# Patient Record
Sex: MALE | Race: WHITE | NOT HISPANIC OR LATINO | Employment: OTHER | URBAN - METROPOLITAN AREA
[De-identification: names, ages, dates, MRNs, and addresses within clinical notes are randomized per-mention and may not be internally consistent; named-entity substitution may affect disease eponyms.]

---

## 2019-10-29 ENCOUNTER — HOSPITAL ENCOUNTER (OUTPATIENT)
Dept: LAB | Facility: MEDICAL CENTER | Age: 64
End: 2019-10-29
Attending: FAMILY MEDICINE
Payer: COMMERCIAL

## 2019-10-29 LAB
25(OH)D3 SERPL-MCNC: 17 NG/ML (ref 30–100)
ALBUMIN SERPL BCP-MCNC: 4.5 G/DL (ref 3.2–4.9)
ALBUMIN/GLOB SERPL: 1.6 G/DL
ALP SERPL-CCNC: 87 U/L (ref 30–99)
ALT SERPL-CCNC: 23 U/L (ref 2–50)
ANION GAP SERPL CALC-SCNC: 8 MMOL/L (ref 0–11.9)
AST SERPL-CCNC: 39 U/L (ref 12–45)
BASOPHILS # BLD AUTO: 0.9 % (ref 0–1.8)
BASOPHILS # BLD: 0.08 K/UL (ref 0–0.12)
BILIRUB SERPL-MCNC: 0.6 MG/DL (ref 0.1–1.5)
BUN SERPL-MCNC: 20 MG/DL (ref 8–22)
CALCIUM SERPL-MCNC: 9.6 MG/DL (ref 8.5–10.5)
CHLORIDE SERPL-SCNC: 98 MMOL/L (ref 96–112)
CHOLEST SERPL-MCNC: 172 MG/DL (ref 100–199)
CO2 SERPL-SCNC: 31 MMOL/L (ref 20–33)
CREAT SERPL-MCNC: 0.98 MG/DL (ref 0.5–1.4)
EOSINOPHIL # BLD AUTO: 0.21 K/UL (ref 0–0.51)
EOSINOPHIL NFR BLD: 2.4 % (ref 0–6.9)
ERYTHROCYTE [DISTWIDTH] IN BLOOD BY AUTOMATED COUNT: 45.8 FL (ref 35.9–50)
FASTING STATUS PATIENT QL REPORTED: NORMAL
GLOBULIN SER CALC-MCNC: 2.8 G/DL (ref 1.9–3.5)
GLUCOSE SERPL-MCNC: 84 MG/DL (ref 65–99)
HCT VFR BLD AUTO: 44.7 % (ref 42–52)
HDLC SERPL-MCNC: 34 MG/DL
HGB BLD-MCNC: 14.4 G/DL (ref 14–18)
IMM GRANULOCYTES # BLD AUTO: 0.05 K/UL (ref 0–0.11)
IMM GRANULOCYTES NFR BLD AUTO: 0.6 % (ref 0–0.9)
LDLC SERPL CALC-MCNC: 110 MG/DL
LYMPHOCYTES # BLD AUTO: 2.14 K/UL (ref 1–4.8)
LYMPHOCYTES NFR BLD: 24.5 % (ref 22–41)
MCH RBC QN AUTO: 28.1 PG (ref 27–33)
MCHC RBC AUTO-ENTMCNC: 32.2 G/DL (ref 33.7–35.3)
MCV RBC AUTO: 87.3 FL (ref 81.4–97.8)
MONOCYTES # BLD AUTO: 0.8 K/UL (ref 0–0.85)
MONOCYTES NFR BLD AUTO: 9.2 % (ref 0–13.4)
NEUTROPHILS # BLD AUTO: 5.46 K/UL (ref 1.82–7.42)
NEUTROPHILS NFR BLD: 62.4 % (ref 44–72)
NRBC # BLD AUTO: 0 K/UL
NRBC BLD-RTO: 0 /100 WBC
PLATELET # BLD AUTO: 395 K/UL (ref 164–446)
PMV BLD AUTO: 9.7 FL (ref 9–12.9)
POTASSIUM SERPL-SCNC: 4.6 MMOL/L (ref 3.6–5.5)
PROT SERPL-MCNC: 7.3 G/DL (ref 6–8.2)
PSA SERPL-MCNC: 0.35 NG/ML (ref 0–4)
RBC # BLD AUTO: 5.12 M/UL (ref 4.7–6.1)
SODIUM SERPL-SCNC: 137 MMOL/L (ref 135–145)
TRIGL SERPL-MCNC: 138 MG/DL (ref 0–149)
WBC # BLD AUTO: 8.7 K/UL (ref 4.8–10.8)

## 2019-10-29 PROCEDURE — 82306 VITAMIN D 25 HYDROXY: CPT

## 2019-10-29 PROCEDURE — 84153 ASSAY OF PSA TOTAL: CPT

## 2019-10-29 PROCEDURE — 85025 COMPLETE CBC W/AUTO DIFF WBC: CPT

## 2019-10-29 PROCEDURE — 80053 COMPREHEN METABOLIC PANEL: CPT

## 2019-10-29 PROCEDURE — 36415 COLL VENOUS BLD VENIPUNCTURE: CPT

## 2019-10-29 PROCEDURE — 80061 LIPID PANEL: CPT

## 2020-05-28 ENCOUNTER — HOSPITAL ENCOUNTER (OUTPATIENT)
Dept: LAB | Facility: MEDICAL CENTER | Age: 65
End: 2020-05-28
Attending: FAMILY MEDICINE
Payer: COMMERCIAL

## 2020-05-28 LAB
ALBUMIN SERPL BCP-MCNC: 3.6 G/DL (ref 3.2–4.9)
ALBUMIN/GLOB SERPL: 1.2 G/DL
ALP SERPL-CCNC: 81 U/L (ref 30–99)
ALT SERPL-CCNC: 20 U/L (ref 2–50)
ANION GAP SERPL CALC-SCNC: 11 MMOL/L (ref 7–16)
AST SERPL-CCNC: 28 U/L (ref 12–45)
BASOPHILS # BLD AUTO: 0.6 % (ref 0–1.8)
BASOPHILS # BLD: 0.08 K/UL (ref 0–0.12)
BILIRUB SERPL-MCNC: 0.7 MG/DL (ref 0.1–1.5)
BUN SERPL-MCNC: 17 MG/DL (ref 8–22)
CALCIUM SERPL-MCNC: 9.5 MG/DL (ref 8.5–10.5)
CHLORIDE SERPL-SCNC: 89 MMOL/L (ref 96–112)
CHOLEST SERPL-MCNC: 137 MG/DL (ref 100–199)
CO2 SERPL-SCNC: 28 MMOL/L (ref 20–33)
CREAT SERPL-MCNC: 0.87 MG/DL (ref 0.5–1.4)
EOSINOPHIL # BLD AUTO: 0.08 K/UL (ref 0–0.51)
EOSINOPHIL NFR BLD: 0.6 % (ref 0–6.9)
ERYTHROCYTE [DISTWIDTH] IN BLOOD BY AUTOMATED COUNT: 42.6 FL (ref 35.9–50)
FASTING STATUS PATIENT QL REPORTED: NORMAL
GLOBULIN SER CALC-MCNC: 3.1 G/DL (ref 1.9–3.5)
GLUCOSE SERPL-MCNC: 96 MG/DL (ref 65–99)
HCT VFR BLD AUTO: 35.6 % (ref 42–52)
HDLC SERPL-MCNC: 30 MG/DL
HGB BLD-MCNC: 11.5 G/DL (ref 14–18)
IMM GRANULOCYTES # BLD AUTO: 0.08 K/UL (ref 0–0.11)
IMM GRANULOCYTES NFR BLD AUTO: 0.6 % (ref 0–0.9)
LDLC SERPL CALC-MCNC: 86 MG/DL
LYMPHOCYTES # BLD AUTO: 1.59 K/UL (ref 1–4.8)
LYMPHOCYTES NFR BLD: 12.3 % (ref 22–41)
MCH RBC QN AUTO: 27.5 PG (ref 27–33)
MCHC RBC AUTO-ENTMCNC: 32.3 G/DL (ref 33.7–35.3)
MCV RBC AUTO: 85.2 FL (ref 81.4–97.8)
MONOCYTES # BLD AUTO: 1.44 K/UL (ref 0–0.85)
MONOCYTES NFR BLD AUTO: 11.2 % (ref 0–13.4)
NEUTROPHILS # BLD AUTO: 9.61 K/UL (ref 1.82–7.42)
NEUTROPHILS NFR BLD: 74.7 % (ref 44–72)
NRBC # BLD AUTO: 0 K/UL
NRBC BLD-RTO: 0 /100 WBC
PLATELET # BLD AUTO: 596 K/UL (ref 164–446)
PMV BLD AUTO: 9 FL (ref 9–12.9)
POTASSIUM SERPL-SCNC: 4.2 MMOL/L (ref 3.6–5.5)
PROT SERPL-MCNC: 6.7 G/DL (ref 6–8.2)
RBC # BLD AUTO: 4.18 M/UL (ref 4.7–6.1)
SODIUM SERPL-SCNC: 128 MMOL/L (ref 135–145)
TRIGL SERPL-MCNC: 104 MG/DL (ref 0–149)
WBC # BLD AUTO: 12.9 K/UL (ref 4.8–10.8)

## 2020-05-28 PROCEDURE — 80053 COMPREHEN METABOLIC PANEL: CPT

## 2020-05-28 PROCEDURE — 82306 VITAMIN D 25 HYDROXY: CPT

## 2020-05-28 PROCEDURE — 85025 COMPLETE CBC W/AUTO DIFF WBC: CPT

## 2020-05-28 PROCEDURE — 36415 COLL VENOUS BLD VENIPUNCTURE: CPT

## 2020-05-28 PROCEDURE — 80061 LIPID PANEL: CPT

## 2020-05-29 LAB — 25(OH)D3 SERPL-MCNC: 46 NG/ML (ref 30–100)

## 2020-06-11 ENCOUNTER — APPOINTMENT (OUTPATIENT)
Dept: RADIOLOGY | Facility: MEDICAL CENTER | Age: 65
DRG: 853 | End: 2020-06-11
Attending: EMERGENCY MEDICINE
Payer: COMMERCIAL

## 2020-06-11 ENCOUNTER — HOSPITAL ENCOUNTER (INPATIENT)
Facility: MEDICAL CENTER | Age: 65
LOS: 15 days | DRG: 853 | End: 2020-06-26
Attending: EMERGENCY MEDICINE | Admitting: INTERNAL MEDICINE
Payer: COMMERCIAL

## 2020-06-11 DIAGNOSIS — D64.9 SYMPTOMATIC ANEMIA: ICD-10-CM

## 2020-06-11 DIAGNOSIS — K25.4 GASTROINTESTINAL HEMORRHAGE ASSOCIATED WITH GASTRIC ULCER: ICD-10-CM

## 2020-06-11 DIAGNOSIS — K92.2 UPPER GI BLEED: ICD-10-CM

## 2020-06-11 DIAGNOSIS — K31.89 GASTRIC MASS: Primary | ICD-10-CM

## 2020-06-11 LAB
ABO + RH BLD: NORMAL
ABO GROUP BLD: NORMAL
ALBUMIN SERPL BCP-MCNC: 3.2 G/DL (ref 3.2–4.9)
ALBUMIN/GLOB SERPL: 1 G/DL
ALP SERPL-CCNC: 85 U/L (ref 30–99)
ALT SERPL-CCNC: 22 U/L (ref 2–50)
ANION GAP SERPL CALC-SCNC: 15 MMOL/L (ref 7–16)
APTT PPP: 21.4 SEC (ref 24.7–36)
AST SERPL-CCNC: 22 U/L (ref 12–45)
BARCODED ABORH UBTYP: 5100
BARCODED PRD CODE UBPRD: NORMAL
BARCODED UNIT NUM UBUNT: NORMAL
BASOPHILS # BLD AUTO: 0.3 % (ref 0–1.8)
BASOPHILS # BLD: 0.07 K/UL (ref 0–0.12)
BILIRUB SERPL-MCNC: 0.5 MG/DL (ref 0.1–1.5)
BLD GP AB SCN SERPL QL: NORMAL
BUN SERPL-MCNC: 21 MG/DL (ref 8–22)
CALCIUM SERPL-MCNC: 9 MG/DL (ref 8.5–10.5)
CHLORIDE SERPL-SCNC: 93 MMOL/L (ref 96–112)
CO2 SERPL-SCNC: 23 MMOL/L (ref 20–33)
COMPONENT R 8504R: NORMAL
CREAT SERPL-MCNC: 0.96 MG/DL (ref 0.5–1.4)
EOSINOPHIL # BLD AUTO: 0.03 K/UL (ref 0–0.51)
EOSINOPHIL NFR BLD: 0.1 % (ref 0–6.9)
ERYTHROCYTE [DISTWIDTH] IN BLOOD BY AUTOMATED COUNT: 44.6 FL (ref 35.9–50)
GLOBULIN SER CALC-MCNC: 3.3 G/DL (ref 1.9–3.5)
GLUCOSE SERPL-MCNC: 147 MG/DL (ref 65–99)
HCT VFR BLD AUTO: 25 % (ref 42–52)
HGB BLD-MCNC: 8 G/DL (ref 14–18)
IMM GRANULOCYTES # BLD AUTO: 0.33 K/UL (ref 0–0.11)
IMM GRANULOCYTES NFR BLD AUTO: 1.3 % (ref 0–0.9)
INR PPP: 1.21 (ref 0.87–1.13)
LACTATE BLD-SCNC: 1.5 MMOL/L (ref 0.5–2)
LIPASE SERPL-CCNC: 26 U/L (ref 11–82)
LYMPHOCYTES # BLD AUTO: 1.35 K/UL (ref 1–4.8)
LYMPHOCYTES NFR BLD: 5.5 % (ref 22–41)
MCH RBC QN AUTO: 27.3 PG (ref 27–33)
MCHC RBC AUTO-ENTMCNC: 32 G/DL (ref 33.7–35.3)
MCV RBC AUTO: 85.3 FL (ref 81.4–97.8)
MONOCYTES # BLD AUTO: 0.98 K/UL (ref 0–0.85)
MONOCYTES NFR BLD AUTO: 4 % (ref 0–13.4)
NEUTROPHILS # BLD AUTO: 21.7 K/UL (ref 1.82–7.42)
NEUTROPHILS NFR BLD: 88.8 % (ref 44–72)
NRBC # BLD AUTO: 0 K/UL
NRBC BLD-RTO: 0 /100 WBC
PLATELET # BLD AUTO: 862 K/UL (ref 164–446)
PMV BLD AUTO: 8.7 FL (ref 9–12.9)
POTASSIUM SERPL-SCNC: 4.8 MMOL/L (ref 3.6–5.5)
PRODUCT TYPE UPROD: NORMAL
PROT SERPL-MCNC: 6.5 G/DL (ref 6–8.2)
PROTHROMBIN TIME: 15.6 SEC (ref 12–14.6)
RBC # BLD AUTO: 2.93 M/UL (ref 4.7–6.1)
RH BLD: NORMAL
SODIUM SERPL-SCNC: 131 MMOL/L (ref 135–145)
UNIT STATUS USTAT: NORMAL
WBC # BLD AUTO: 24.5 K/UL (ref 4.8–10.8)

## 2020-06-11 PROCEDURE — 96375 TX/PRO/DX INJ NEW DRUG ADDON: CPT

## 2020-06-11 PROCEDURE — 86900 BLOOD TYPING SEROLOGIC ABO: CPT

## 2020-06-11 PROCEDURE — 99223 1ST HOSP IP/OBS HIGH 75: CPT | Performed by: INTERNAL MEDICINE

## 2020-06-11 PROCEDURE — 96368 THER/DIAG CONCURRENT INF: CPT

## 2020-06-11 PROCEDURE — 83690 ASSAY OF LIPASE: CPT

## 2020-06-11 PROCEDURE — C9803 HOPD COVID-19 SPEC COLLECT: HCPCS | Performed by: INTERNAL MEDICINE

## 2020-06-11 PROCEDURE — 71045 X-RAY EXAM CHEST 1 VIEW: CPT

## 2020-06-11 PROCEDURE — 85610 PROTHROMBIN TIME: CPT

## 2020-06-11 PROCEDURE — 85025 COMPLETE CBC W/AUTO DIFF WBC: CPT

## 2020-06-11 PROCEDURE — 700105 HCHG RX REV CODE 258: Performed by: INTERNAL MEDICINE

## 2020-06-11 PROCEDURE — C9113 INJ PANTOPRAZOLE SODIUM, VIA: HCPCS | Performed by: INTERNAL MEDICINE

## 2020-06-11 PROCEDURE — 700111 HCHG RX REV CODE 636 W/ 250 OVERRIDE (IP): Performed by: EMERGENCY MEDICINE

## 2020-06-11 PROCEDURE — 87040 BLOOD CULTURE FOR BACTERIA: CPT

## 2020-06-11 PROCEDURE — C9113 INJ PANTOPRAZOLE SODIUM, VIA: HCPCS | Performed by: EMERGENCY MEDICINE

## 2020-06-11 PROCEDURE — 770020 HCHG ROOM/CARE - TELE (206)

## 2020-06-11 PROCEDURE — 700111 HCHG RX REV CODE 636 W/ 250 OVERRIDE (IP): Performed by: INTERNAL MEDICINE

## 2020-06-11 PROCEDURE — 86850 RBC ANTIBODY SCREEN: CPT

## 2020-06-11 PROCEDURE — 80053 COMPREHEN METABOLIC PANEL: CPT

## 2020-06-11 PROCEDURE — 86901 BLOOD TYPING SEROLOGIC RH(D): CPT

## 2020-06-11 PROCEDURE — 700105 HCHG RX REV CODE 258: Performed by: EMERGENCY MEDICINE

## 2020-06-11 PROCEDURE — 96367 TX/PROPH/DG ADDL SEQ IV INF: CPT

## 2020-06-11 PROCEDURE — 700101 HCHG RX REV CODE 250: Performed by: EMERGENCY MEDICINE

## 2020-06-11 PROCEDURE — 83605 ASSAY OF LACTIC ACID: CPT

## 2020-06-11 PROCEDURE — 96365 THER/PROPH/DIAG IV INF INIT: CPT

## 2020-06-11 PROCEDURE — 74177 CT ABD & PELVIS W/CONTRAST: CPT

## 2020-06-11 PROCEDURE — 700117 HCHG RX CONTRAST REV CODE 255: Performed by: EMERGENCY MEDICINE

## 2020-06-11 PROCEDURE — 85730 THROMBOPLASTIN TIME PARTIAL: CPT

## 2020-06-11 PROCEDURE — 36415 COLL VENOUS BLD VENIPUNCTURE: CPT

## 2020-06-11 PROCEDURE — 96366 THER/PROPH/DIAG IV INF ADDON: CPT

## 2020-06-11 PROCEDURE — 99285 EMERGENCY DEPT VISIT HI MDM: CPT

## 2020-06-11 RX ORDER — ENALAPRILAT 1.25 MG/ML
1.25 INJECTION INTRAVENOUS EVERY 6 HOURS PRN
Status: DISCONTINUED | OUTPATIENT
Start: 2020-06-11 | End: 2020-06-26 | Stop reason: HOSPADM

## 2020-06-11 RX ORDER — ONDANSETRON 2 MG/ML
4 INJECTION INTRAMUSCULAR; INTRAVENOUS EVERY 4 HOURS PRN
Status: DISCONTINUED | OUTPATIENT
Start: 2020-06-11 | End: 2020-06-26 | Stop reason: HOSPADM

## 2020-06-11 RX ORDER — SODIUM CHLORIDE, SODIUM LACTATE, POTASSIUM CHLORIDE, AND CALCIUM CHLORIDE .6; .31; .03; .02 G/100ML; G/100ML; G/100ML; G/100ML
30 INJECTION, SOLUTION INTRAVENOUS
Status: DISCONTINUED | OUTPATIENT
Start: 2020-06-11 | End: 2020-06-26 | Stop reason: HOSPADM

## 2020-06-11 RX ORDER — AMOXICILLIN 250 MG
2 CAPSULE ORAL 2 TIMES DAILY
Status: DISCONTINUED | OUTPATIENT
Start: 2020-06-11 | End: 2020-06-26 | Stop reason: HOSPADM

## 2020-06-11 RX ORDER — POLYETHYLENE GLYCOL 3350 17 G/17G
1 POWDER, FOR SOLUTION ORAL
Status: DISCONTINUED | OUTPATIENT
Start: 2020-06-11 | End: 2020-06-26 | Stop reason: HOSPADM

## 2020-06-11 RX ORDER — SODIUM CHLORIDE, SODIUM LACTATE, POTASSIUM CHLORIDE, AND CALCIUM CHLORIDE .6; .31; .03; .02 G/100ML; G/100ML; G/100ML; G/100ML
1000 INJECTION, SOLUTION INTRAVENOUS
Status: DISCONTINUED | OUTPATIENT
Start: 2020-06-11 | End: 2020-06-26 | Stop reason: HOSPADM

## 2020-06-11 RX ORDER — PROMETHAZINE HYDROCHLORIDE 25 MG/1
12.5-25 SUPPOSITORY RECTAL EVERY 4 HOURS PRN
Status: DISCONTINUED | OUTPATIENT
Start: 2020-06-11 | End: 2020-06-26 | Stop reason: HOSPADM

## 2020-06-11 RX ORDER — SODIUM CHLORIDE, SODIUM LACTATE, POTASSIUM CHLORIDE, CALCIUM CHLORIDE 600; 310; 30; 20 MG/100ML; MG/100ML; MG/100ML; MG/100ML
INJECTION, SOLUTION INTRAVENOUS CONTINUOUS
Status: DISCONTINUED | OUTPATIENT
Start: 2020-06-11 | End: 2020-06-15

## 2020-06-11 RX ORDER — ACETAMINOPHEN 325 MG/1
650 TABLET ORAL EVERY 6 HOURS PRN
Status: DISCONTINUED | OUTPATIENT
Start: 2020-06-11 | End: 2020-06-26 | Stop reason: HOSPADM

## 2020-06-11 RX ORDER — BISACODYL 10 MG
10 SUPPOSITORY, RECTAL RECTAL
Status: DISCONTINUED | OUTPATIENT
Start: 2020-06-11 | End: 2020-06-26 | Stop reason: HOSPADM

## 2020-06-11 RX ORDER — LISINOPRIL AND HYDROCHLOROTHIAZIDE 20; 12.5 MG/1; MG/1
1 TABLET ORAL DAILY
COMMUNITY

## 2020-06-11 RX ORDER — SODIUM CHLORIDE 9 MG/ML
1000 INJECTION, SOLUTION INTRAVENOUS ONCE
Status: COMPLETED | OUTPATIENT
Start: 2020-06-11 | End: 2020-06-11

## 2020-06-11 RX ORDER — PROMETHAZINE HYDROCHLORIDE 25 MG/1
12.5-25 TABLET ORAL EVERY 4 HOURS PRN
Status: DISCONTINUED | OUTPATIENT
Start: 2020-06-11 | End: 2020-06-26 | Stop reason: HOSPADM

## 2020-06-11 RX ORDER — ONDANSETRON 4 MG/1
4 TABLET, ORALLY DISINTEGRATING ORAL EVERY 4 HOURS PRN
Status: DISCONTINUED | OUTPATIENT
Start: 2020-06-11 | End: 2020-06-26 | Stop reason: HOSPADM

## 2020-06-11 RX ORDER — PROCHLORPERAZINE EDISYLATE 5 MG/ML
5-10 INJECTION INTRAMUSCULAR; INTRAVENOUS EVERY 4 HOURS PRN
Status: DISCONTINUED | OUTPATIENT
Start: 2020-06-11 | End: 2020-06-26 | Stop reason: HOSPADM

## 2020-06-11 RX ADMIN — IOHEXOL 100 ML: 350 INJECTION, SOLUTION INTRAVENOUS at 18:16

## 2020-06-11 RX ADMIN — SODIUM CHLORIDE 1000 ML: 9 INJECTION, SOLUTION INTRAVENOUS at 16:47

## 2020-06-11 RX ADMIN — SODIUM CHLORIDE 8 MG/HR: 9 INJECTION, SOLUTION INTRAVENOUS at 17:24

## 2020-06-11 RX ADMIN — METRONIDAZOLE 500 MG: 500 INJECTION, SOLUTION INTRAVENOUS at 19:45

## 2020-06-11 RX ADMIN — SODIUM CHLORIDE 80 MG: 9 INJECTION, SOLUTION INTRAVENOUS at 17:05

## 2020-06-11 RX ADMIN — SODIUM CHLORIDE, POTASSIUM CHLORIDE, SODIUM LACTATE AND CALCIUM CHLORIDE: 600; 310; 30; 20 INJECTION, SOLUTION INTRAVENOUS at 21:20

## 2020-06-11 RX ADMIN — CEFTRIAXONE SODIUM 2 G: 2 INJECTION, POWDER, FOR SOLUTION INTRAMUSCULAR; INTRAVENOUS at 19:31

## 2020-06-11 SDOH — HEALTH STABILITY: MENTAL HEALTH: HOW OFTEN DO YOU HAVE A DRINK CONTAINING ALCOHOL?: NEVER

## 2020-06-11 ASSESSMENT — PATIENT HEALTH QUESTIONNAIRE - PHQ9
2. FEELING DOWN, DEPRESSED, IRRITABLE, OR HOPELESS: NOT AT ALL
1. LITTLE INTEREST OR PLEASURE IN DOING THINGS: NOT AT ALL
SUM OF ALL RESPONSES TO PHQ9 QUESTIONS 1 AND 2: 0

## 2020-06-11 ASSESSMENT — ENCOUNTER SYMPTOMS
ABDOMINAL PAIN: 0
VOMITING: 1
CHILLS: 0
LOSS OF CONSCIOUSNESS: 0
PALPITATIONS: 0
HEADACHES: 0
FALLS: 0
SPUTUM PRODUCTION: 0
FEVER: 0
MYALGIAS: 0
STRIDOR: 0
DEPRESSION: 0
TINGLING: 0
DIARRHEA: 0
COUGH: 0
WEAKNESS: 0
SHORTNESS OF BREATH: 0
DIZZINESS: 1
NAUSEA: 1
CONSTIPATION: 0

## 2020-06-11 ASSESSMENT — LIFESTYLE VARIABLES: DO YOU DRINK ALCOHOL: NO

## 2020-06-11 ASSESSMENT — FIBROSIS 4 INDEX: FIB4 SCORE: 0.67

## 2020-06-11 NOTE — ED TRIAGE NOTES
Wheeled to triage  Chief Complaint   Patient presents with   • Bloody Stools   • Blood in Vomit     Pt said he noticed bright red blood in his stool and vomit this morning, denies any abd pain, pt is pale and said gets dizzy when standing. BP (!) 91/63   Pulse (!) 121   Temp 36.8 °C (98.2 °F) (Temporal)   Resp 16   Ht 1.829 m (6')   Wt 97.5 kg (215 lb)   SpO2 100%   BMI 29.16 kg/m²

## 2020-06-11 NOTE — ED NOTES
Pt wheeled to room red 6.  Agree with triage note.  Pt in gown and placed on monitors.  ERP to see.

## 2020-06-12 ENCOUNTER — ANESTHESIA EVENT (OUTPATIENT)
Dept: SURGERY | Facility: MEDICAL CENTER | Age: 65
DRG: 853 | End: 2020-06-12
Payer: COMMERCIAL

## 2020-06-12 ENCOUNTER — ANESTHESIA (OUTPATIENT)
Dept: SURGERY | Facility: MEDICAL CENTER | Age: 65
DRG: 853 | End: 2020-06-12
Payer: COMMERCIAL

## 2020-06-12 PROBLEM — A41.9 SEPSIS (HCC): Status: ACTIVE | Noted: 2020-06-12

## 2020-06-12 PROBLEM — E87.1 HYPONATREMIA: Status: ACTIVE | Noted: 2020-06-12

## 2020-06-12 PROBLEM — D62 ACUTE BLOOD LOSS ANEMIA: Status: ACTIVE | Noted: 2020-06-12

## 2020-06-12 PROBLEM — K31.89 GASTRIC MASS: Status: ACTIVE | Noted: 2020-06-12

## 2020-06-12 PROBLEM — K92.2 GI BLEED: Status: ACTIVE | Noted: 2020-06-12

## 2020-06-12 PROBLEM — D72.829 LEUKOCYTOSIS: Status: ACTIVE | Noted: 2020-06-12

## 2020-06-12 PROBLEM — D75.839 THROMBOCYTOSIS: Status: ACTIVE | Noted: 2020-06-12

## 2020-06-12 PROBLEM — R73.9 HYPERGLYCEMIA: Status: ACTIVE | Noted: 2020-06-12

## 2020-06-12 LAB
ANION GAP SERPL CALC-SCNC: 10 MMOL/L (ref 7–16)
APPEARANCE UR: CLEAR
BILIRUB UR QL STRIP.AUTO: NEGATIVE
BUN SERPL-MCNC: 24 MG/DL (ref 8–22)
CALCIUM SERPL-MCNC: 8.4 MG/DL (ref 8.5–10.5)
CEA SERPL-MCNC: <0.6 NG/ML (ref 0–3)
CHLORIDE SERPL-SCNC: 100 MMOL/L (ref 96–112)
CO2 SERPL-SCNC: 24 MMOL/L (ref 20–33)
COLOR UR: YELLOW
COVID ORDER STATUS COVID19: NORMAL
CREAT SERPL-MCNC: 0.87 MG/DL (ref 0.5–1.4)
ERYTHROCYTE [DISTWIDTH] IN BLOOD BY AUTOMATED COUNT: 44.9 FL (ref 35.9–50)
FERRITIN SERPL-MCNC: 546 NG/ML (ref 22–322)
FOLATE SERPL-MCNC: 11 NG/ML
GLUCOSE SERPL-MCNC: 94 MG/DL (ref 65–99)
GLUCOSE UR STRIP.AUTO-MCNC: NEGATIVE MG/DL
HCT VFR BLD AUTO: 18.9 % (ref 42–52)
HGB BLD-MCNC: 5.9 G/DL (ref 14–18)
HGB BLD-MCNC: 6.7 G/DL (ref 14–18)
HGB BLD-MCNC: 7.3 G/DL (ref 14–18)
HGB BLD-MCNC: 7.7 G/DL (ref 14–18)
INR PPP: 1.13 (ref 0.87–1.13)
IRON SATN MFR SERPL: 19 % (ref 15–55)
IRON SERPL-MCNC: 31 UG/DL (ref 50–180)
KETONES UR STRIP.AUTO-MCNC: NEGATIVE MG/DL
LACTATE BLD-SCNC: 1.1 MMOL/L (ref 0.5–2)
LACTATE BLD-SCNC: 1.2 MMOL/L (ref 0.5–2)
LACTATE BLD-SCNC: 2.1 MMOL/L (ref 0.5–2)
LEUKOCYTE ESTERASE UR QL STRIP.AUTO: NEGATIVE
MCH RBC QN AUTO: 27.4 PG (ref 27–33)
MCHC RBC AUTO-ENTMCNC: 31.2 G/DL (ref 33.7–35.3)
MCV RBC AUTO: 87.9 FL (ref 81.4–97.8)
MICRO URNS: NORMAL
NITRITE UR QL STRIP.AUTO: NEGATIVE
PATHOLOGY CONSULT NOTE: NORMAL
PH UR STRIP.AUTO: 5.5 [PH] (ref 5–8)
PLATELET # BLD AUTO: 529 K/UL (ref 164–446)
PMV BLD AUTO: 8.6 FL (ref 9–12.9)
POTASSIUM SERPL-SCNC: 4 MMOL/L (ref 3.6–5.5)
PROT UR QL STRIP: NEGATIVE MG/DL
PROTHROMBIN TIME: 14.7 SEC (ref 12–14.6)
RBC # BLD AUTO: 2.15 M/UL (ref 4.7–6.1)
RBC UR QL AUTO: NEGATIVE
SARS-COV-2 RNA RESP QL NAA+PROBE: NOTDETECTED
SODIUM SERPL-SCNC: 134 MMOL/L (ref 135–145)
SP GR UR STRIP.AUTO: 1.02
SPECIMEN SOURCE: NORMAL
TIBC SERPL-MCNC: 162 UG/DL (ref 250–450)
UIBC SERPL-MCNC: 131 UG/DL (ref 110–370)
UROBILINOGEN UR STRIP.AUTO-MCNC: 0.2 MG/DL
VIT B12 SERPL-MCNC: 265 PG/ML (ref 211–911)
WBC # BLD AUTO: 10.6 K/UL (ref 4.8–10.8)

## 2020-06-12 PROCEDURE — P9016 RBC LEUKOCYTES REDUCED: HCPCS

## 2020-06-12 PROCEDURE — 82746 ASSAY OF FOLIC ACID SERUM: CPT

## 2020-06-12 PROCEDURE — 160029 HCHG SURGERY MINUTES - 1ST 30 MINS LEVEL 4: Performed by: INTERNAL MEDICINE

## 2020-06-12 PROCEDURE — 700111 HCHG RX REV CODE 636 W/ 250 OVERRIDE (IP): Performed by: INTERNAL MEDICINE

## 2020-06-12 PROCEDURE — 85610 PROTHROMBIN TIME: CPT

## 2020-06-12 PROCEDURE — 160035 HCHG PACU - 1ST 60 MINS PHASE I: Performed by: INTERNAL MEDICINE

## 2020-06-12 PROCEDURE — 85027 COMPLETE CBC AUTOMATED: CPT

## 2020-06-12 PROCEDURE — A9270 NON-COVERED ITEM OR SERVICE: HCPCS | Performed by: INTERNAL MEDICINE

## 2020-06-12 PROCEDURE — U0004 COV-19 TEST NON-CDC HGH THRU: HCPCS

## 2020-06-12 PROCEDURE — 0DB68ZX EXCISION OF STOMACH, VIA NATURAL OR ARTIFICIAL OPENING ENDOSCOPIC, DIAGNOSTIC: ICD-10-PCS | Performed by: INTERNAL MEDICINE

## 2020-06-12 PROCEDURE — 88305 TISSUE EXAM BY PATHOLOGIST: CPT

## 2020-06-12 PROCEDURE — 700105 HCHG RX REV CODE 258: Performed by: INTERNAL MEDICINE

## 2020-06-12 PROCEDURE — 700102 HCHG RX REV CODE 250 W/ 637 OVERRIDE(OP): Performed by: INTERNAL MEDICINE

## 2020-06-12 PROCEDURE — 94760 N-INVAS EAR/PLS OXIMETRY 1: CPT

## 2020-06-12 PROCEDURE — 700101 HCHG RX REV CODE 250: Performed by: ANESTHESIOLOGY

## 2020-06-12 PROCEDURE — 85018 HEMOGLOBIN: CPT | Mod: 91

## 2020-06-12 PROCEDURE — 81003 URINALYSIS AUTO W/O SCOPE: CPT

## 2020-06-12 PROCEDURE — 160048 HCHG OR STATISTICAL LEVEL 1-5: Performed by: INTERNAL MEDICINE

## 2020-06-12 PROCEDURE — 83605 ASSAY OF LACTIC ACID: CPT | Mod: 91

## 2020-06-12 PROCEDURE — 83540 ASSAY OF IRON: CPT

## 2020-06-12 PROCEDURE — 82378 CARCINOEMBRYONIC ANTIGEN: CPT

## 2020-06-12 PROCEDURE — 88342 IMHCHEM/IMCYTCHM 1ST ANTB: CPT

## 2020-06-12 PROCEDURE — 160203 HCHG ENDO MINUTES - 1ST 30 MINS LEVEL 4: Performed by: INTERNAL MEDICINE

## 2020-06-12 PROCEDURE — C9113 INJ PANTOPRAZOLE SODIUM, VIA: HCPCS | Performed by: INTERNAL MEDICINE

## 2020-06-12 PROCEDURE — 160009 HCHG ANES TIME/MIN: Performed by: INTERNAL MEDICINE

## 2020-06-12 PROCEDURE — 86923 COMPATIBILITY TEST ELECTRIC: CPT | Mod: 91

## 2020-06-12 PROCEDURE — 88341 IMHCHEM/IMCYTCHM EA ADD ANTB: CPT

## 2020-06-12 PROCEDURE — 700105 HCHG RX REV CODE 258

## 2020-06-12 PROCEDURE — 500066 HCHG BITE BLOCK, ECT: Performed by: INTERNAL MEDICINE

## 2020-06-12 PROCEDURE — 30233N1 TRANSFUSION OF NONAUTOLOGOUS RED BLOOD CELLS INTO PERIPHERAL VEIN, PERCUTANEOUS APPROACH: ICD-10-PCS | Performed by: INTERNAL MEDICINE

## 2020-06-12 PROCEDURE — 160002 HCHG RECOVERY MINUTES (STAT): Performed by: INTERNAL MEDICINE

## 2020-06-12 PROCEDURE — 99233 SBSQ HOSP IP/OBS HIGH 50: CPT | Performed by: INTERNAL MEDICINE

## 2020-06-12 PROCEDURE — 36430 TRANSFUSION BLD/BLD COMPNT: CPT

## 2020-06-12 PROCEDURE — 82607 VITAMIN B-12: CPT

## 2020-06-12 PROCEDURE — 83550 IRON BINDING TEST: CPT

## 2020-06-12 PROCEDURE — 80048 BASIC METABOLIC PNL TOTAL CA: CPT

## 2020-06-12 PROCEDURE — 88312 SPECIAL STAINS GROUP 1: CPT

## 2020-06-12 PROCEDURE — 36415 COLL VENOUS BLD VENIPUNCTURE: CPT

## 2020-06-12 PROCEDURE — 700101 HCHG RX REV CODE 250: Performed by: INTERNAL MEDICINE

## 2020-06-12 PROCEDURE — 82728 ASSAY OF FERRITIN: CPT

## 2020-06-12 PROCEDURE — 700111 HCHG RX REV CODE 636 W/ 250 OVERRIDE (IP): Performed by: ANESTHESIOLOGY

## 2020-06-12 PROCEDURE — 770020 HCHG ROOM/CARE - TELE (206)

## 2020-06-12 RX ORDER — SODIUM CHLORIDE 9 MG/ML
INJECTION, SOLUTION INTRAVENOUS
Status: ACTIVE
Start: 2020-06-12 | End: 2020-06-12

## 2020-06-12 RX ORDER — SODIUM CHLORIDE, SODIUM LACTATE, POTASSIUM CHLORIDE, CALCIUM CHLORIDE 600; 310; 30; 20 MG/100ML; MG/100ML; MG/100ML; MG/100ML
INJECTION, SOLUTION INTRAVENOUS CONTINUOUS
Status: DISCONTINUED | OUTPATIENT
Start: 2020-06-12 | End: 2020-06-12 | Stop reason: HOSPADM

## 2020-06-12 RX ORDER — SODIUM CHLORIDE, SODIUM LACTATE, POTASSIUM CHLORIDE, CALCIUM CHLORIDE 600; 310; 30; 20 MG/100ML; MG/100ML; MG/100ML; MG/100ML
INJECTION, SOLUTION INTRAVENOUS CONTINUOUS
Status: DISPENSED | OUTPATIENT
Start: 2020-06-12 | End: 2020-06-12

## 2020-06-12 RX ORDER — ONDANSETRON 2 MG/ML
INJECTION INTRAMUSCULAR; INTRAVENOUS PRN
Status: DISCONTINUED | OUTPATIENT
Start: 2020-06-12 | End: 2020-06-12 | Stop reason: HOSPADM

## 2020-06-12 RX ORDER — SODIUM CHLORIDE 9 MG/ML
INJECTION, SOLUTION INTRAVENOUS
Status: COMPLETED
Start: 2020-06-12 | End: 2020-06-12

## 2020-06-12 RX ORDER — DIPHENHYDRAMINE HYDROCHLORIDE 50 MG/ML
12.5 INJECTION INTRAMUSCULAR; INTRAVENOUS
Status: DISCONTINUED | OUTPATIENT
Start: 2020-06-12 | End: 2020-06-12 | Stop reason: HOSPADM

## 2020-06-12 RX ORDER — HALOPERIDOL 5 MG/ML
1 INJECTION INTRAMUSCULAR
Status: DISCONTINUED | OUTPATIENT
Start: 2020-06-12 | End: 2020-06-12 | Stop reason: HOSPADM

## 2020-06-12 RX ORDER — DEXAMETHASONE SODIUM PHOSPHATE 4 MG/ML
INJECTION, SOLUTION INTRA-ARTICULAR; INTRALESIONAL; INTRAMUSCULAR; INTRAVENOUS; SOFT TISSUE PRN
Status: DISCONTINUED | OUTPATIENT
Start: 2020-06-12 | End: 2020-06-12 | Stop reason: SURG

## 2020-06-12 RX ORDER — CHOLECALCIFEROL (VITAMIN D3) 125 MCG
1000 CAPSULE ORAL DAILY
Status: DISCONTINUED | OUTPATIENT
Start: 2020-06-12 | End: 2020-06-15

## 2020-06-12 RX ADMIN — ONDANSETRON 4 MG: 2 INJECTION INTRAMUSCULAR; INTRAVENOUS at 09:41

## 2020-06-12 RX ADMIN — METRONIDAZOLE 500 MG: 500 INJECTION, SOLUTION INTRAVENOUS at 05:58

## 2020-06-12 RX ADMIN — CYANOCOBALAMIN TAB 500 MCG 1000 MCG: 500 TAB at 14:28

## 2020-06-12 RX ADMIN — SODIUM CHLORIDE 8 MG/HR: 9 INJECTION, SOLUTION INTRAVENOUS at 06:39

## 2020-06-12 RX ADMIN — ROCURONIUM BROMIDE 5 MG: 10 INJECTION, SOLUTION INTRAVENOUS at 09:32

## 2020-06-12 RX ADMIN — Medication 120 MG: at 09:32

## 2020-06-12 RX ADMIN — SODIUM CHLORIDE, POTASSIUM CHLORIDE, SODIUM LACTATE AND CALCIUM CHLORIDE: 600; 310; 30; 20 INJECTION, SOLUTION INTRAVENOUS at 11:44

## 2020-06-12 RX ADMIN — METRONIDAZOLE 500 MG: 500 INJECTION, SOLUTION INTRAVENOUS at 22:04

## 2020-06-12 RX ADMIN — CEFTRIAXONE SODIUM 2 G: 2 INJECTION, POWDER, FOR SOLUTION INTRAMUSCULAR; INTRAVENOUS at 19:31

## 2020-06-12 RX ADMIN — SODIUM CHLORIDE, POTASSIUM CHLORIDE, SODIUM LACTATE AND CALCIUM CHLORIDE: 600; 310; 30; 20 INJECTION, SOLUTION INTRAVENOUS at 09:30

## 2020-06-12 RX ADMIN — DEXAMETHASONE SODIUM PHOSPHATE 4 MG: 4 INJECTION, SOLUTION INTRA-ARTICULAR; INTRALESIONAL; INTRAMUSCULAR; INTRAVENOUS; SOFT TISSUE at 09:43

## 2020-06-12 RX ADMIN — POVIDONE-IODINE 15 ML: 10 SOLUTION TOPICAL at 09:21

## 2020-06-12 RX ADMIN — SODIUM CHLORIDE: 9 INJECTION, SOLUTION INTRAVENOUS at 03:45

## 2020-06-12 RX ADMIN — SODIUM CHLORIDE 8 MG/HR: 9 INJECTION, SOLUTION INTRAVENOUS at 18:04

## 2020-06-12 RX ADMIN — PROPOFOL 150 MG: 10 INJECTION, EMULSION INTRAVENOUS at 09:32

## 2020-06-12 RX ADMIN — METRONIDAZOLE 500 MG: 500 INJECTION, SOLUTION INTRAVENOUS at 13:07

## 2020-06-12 RX ADMIN — SODIUM CHLORIDE, POTASSIUM CHLORIDE, SODIUM LACTATE AND CALCIUM CHLORIDE: 600; 310; 30; 20 INJECTION, SOLUTION INTRAVENOUS at 18:06

## 2020-06-12 RX ADMIN — SODIUM CHLORIDE, POTASSIUM CHLORIDE, SODIUM LACTATE AND CALCIUM CHLORIDE: 600; 310; 30; 20 INJECTION, SOLUTION INTRAVENOUS at 02:21

## 2020-06-12 ASSESSMENT — ENCOUNTER SYMPTOMS
WEAKNESS: 0
COUGH: 0
NERVOUS/ANXIOUS: 0
FEVER: 0
VOMITING: 0
SHORTNESS OF BREATH: 0
HEADACHES: 0
BLURRED VISION: 0
FALLS: 0
BLOOD IN STOOL: 1
DIZZINESS: 0
CHILLS: 0
DEPRESSION: 0
ABDOMINAL PAIN: 1
SORE THROAT: 0
DIARRHEA: 0
PALPITATIONS: 0
CONSTIPATION: 0
NAUSEA: 0

## 2020-06-12 ASSESSMENT — LIFESTYLE VARIABLES
ON A TYPICAL DAY WHEN YOU DRINK ALCOHOL HOW MANY DRINKS DO YOU HAVE: 0
ALCOHOL_USE: NO
TOTAL SCORE: 0
AVERAGE NUMBER OF DAYS PER WEEK YOU HAVE A DRINK CONTAINING ALCOHOL: 0
EVER FELT BAD OR GUILTY ABOUT YOUR DRINKING: NO
HAVE PEOPLE ANNOYED YOU BY CRITICIZING YOUR DRINKING: NO
EVER HAD A DRINK FIRST THING IN THE MORNING TO STEADY YOUR NERVES TO GET RID OF A HANGOVER: NO
CONSUMPTION TOTAL: NEGATIVE
EVER_SMOKED: NEVER
TOTAL SCORE: 0
DOES PATIENT WANT TO STOP DRINKING: NO
TOTAL SCORE: 0
HAVE YOU EVER FELT YOU SHOULD CUT DOWN ON YOUR DRINKING: NO
EVER_SMOKED: NEVER
HOW MANY TIMES IN THE PAST YEAR HAVE YOU HAD 5 OR MORE DRINKS IN A DAY: 0

## 2020-06-12 ASSESSMENT — COGNITIVE AND FUNCTIONAL STATUS - GENERAL
SUGGESTED CMS G CODE MODIFIER MOBILITY: CH
DAILY ACTIVITIY SCORE: 24
SUGGESTED CMS G CODE MODIFIER DAILY ACTIVITY: CH
MOBILITY SCORE: 24
DAILY ACTIVITIY SCORE: 24
MOBILITY SCORE: 24
SUGGESTED CMS G CODE MODIFIER DAILY ACTIVITY: CH
SUGGESTED CMS G CODE MODIFIER DAILY ACTIVITY: CH
DAILY ACTIVITIY SCORE: 24
SUGGESTED CMS G CODE MODIFIER MOBILITY: CH

## 2020-06-12 ASSESSMENT — COPD QUESTIONNAIRES
DURING THE PAST 4 WEEKS HOW MUCH DID YOU FEEL SHORT OF BREATH: NONE/LITTLE OF THE TIME
HAVE YOU SMOKED AT LEAST 100 CIGARETTES IN YOUR ENTIRE LIFE: NO/DON'T KNOW
DO YOU EVER COUGH UP ANY MUCUS OR PHLEGM?: NO/ONLY WITH OCCASIONAL COLDS OR INFECTIONS
COPD SCREENING SCORE: 2

## 2020-06-12 ASSESSMENT — PAIN SCALES - GENERAL: PAIN_LEVEL: 0

## 2020-06-12 ASSESSMENT — FIBROSIS 4 INDEX: FIB4 SCORE: 0.57

## 2020-06-12 NOTE — PROGRESS NOTES
Reviewed EGD findings w pt  Plan ex lap, partial gastrectomy, possible splenectomy  Discussed with patient and daughter  On OR schedule for 6/13, 9 am

## 2020-06-12 NOTE — PROGRESS NOTES
2 RN Skin Check completed with JENNIFER Ponce;    Noted general pale complexion with pink blanching heels. Otherwise all other skin surfaces intact and unremarkable.

## 2020-06-12 NOTE — CARE PLAN
Problem: Safety  Goal: Will remain free from falls  Outcome: PROGRESSING AS EXPECTED  Note: Pt mobility assessed at beginning of shift. Pt is standby assist. Fall precautions in place. Non-slip socks on. Bed in lowest locked position. Call light within reach. Pt educated to call for assistance and verbalizes understanding.      Problem: Knowledge Deficit  Goal: Knowledge of disease process/condition, treatment plan, diagnostic tests, and medications will improve  Outcome: PROGRESSING AS EXPECTED  Note: Pt educated about disease process. Reason why medications are taken. And informed about treatment plan.

## 2020-06-12 NOTE — ED PROVIDER NOTES
ED Provider Note    CHIEF COMPLAINT  Chief Complaint   Patient presents with   • Bloody Stools   • Blood in Vomit       [facility]  Napoleon Zendejas is a 65 y.o. male who presents to the emergency department for evaluation of bloody stool and bloody vomit.  He is been having vague abdominal discomfort and early satiety and decreased appetite for some time.  He does take ibuprofen total time but not daily.  Denies any blood thinner use.  Denies any heavy alcohol use.  States he has been having some episodes of dark stool and had some black stool bloody stool today.  Also had hematemesis where he states he vomited about a cup of bright red blood.  Denies any blood thinner use.  No chest pain cough shortness of breath no fevers or chills.  Has had a colonoscopy never had an endoscopy.  Denies any other aggravating relieving factors or associated complaints.    REVIEW OF SYSTEMS  See HPI for further details. All other systems are negative.    PAST MEDICAL HISTORY  Past Medical History:   Diagnosis Date   • Hypertension        FAMILY HISTORY  History reviewed. No pertinent family history.    SOCIAL HISTORY  Social History     Socioeconomic History   • Marital status: Single     Spouse name: Not on file   • Number of children: Not on file   • Years of education: Not on file   • Highest education level: Not on file   Occupational History   • Not on file   Social Needs   • Financial resource strain: Not on file   • Food insecurity     Worry: Not on file     Inability: Not on file   • Transportation needs     Medical: Not on file     Non-medical: Not on file   Tobacco Use   • Smoking status: Never Smoker   • Smokeless tobacco: Never Used   Substance and Sexual Activity   • Alcohol use: Never     Frequency: Never   • Drug use: Never   • Sexual activity: Not on file   Lifestyle   • Physical activity     Days per week: Not on file     Minutes per session: Not on file   • Stress: Not on file   Relationships   • Social connections     Talks  on phone: Not on file     Gets together: Not on file     Attends [employer] service: Not on file     Active member of club or organization: Not on file     Attends meetings of clubs or organizations: Not on file     Relationship status: Not on file   • Intimate partner violence     Fear of current or ex partner: Not on file     Emotionally abused: Not on file     Physically abused: Not on file     Forced sexual activity: Not on file   Other Topics Concern   • Not on file   Social History Narrative   • Not on file       SURGICAL HISTORY  History reviewed. No pertinent surgical history.    CURRENT MEDICATIONS  Home Medications     Reviewed by Nelda Summers (Pharmacy Tech) on 06/11/20 at 1706  Med List Status: Unable to Obtain   Medication Last Dose Status   lisinopril-hydrochlorothiazide (PRINZIDE) 20-12.5 MG per tablet 6/10/2020 Active   VITAMIN D PO  Active                ALLERGIES  No Known Allergies    PHYSICAL EXAM  VITAL SIGNS: /70   Pulse (!) 112   Temp 36.8 °C (98.2 °F) (Temporal)   Resp 16   Ht 1.829 m (6')   Wt 97.5 kg (215 lb)   SpO2 92%   BMI 29.16 kg/m²    Constitutional: Awake alert ill-appearing, in moderate distress  HENT: Normocephalic, Atraumatic, Bilateral external ears normal, Oropharynx moist, No oral exudates, Nose normal.   Eyes: PERRL, EOMI, Conjunctiva pale, No discharge.   Neck: Normal range of motion, No tenderness, Supple, No stridor.  Cardiovascular: Normal heart rate, Normal rhythm, No murmurs, No rubs, No gallops.   Thorax & Lungs: Normal breath sounds, No respiratory distress, No wheezing,  Abdomen: Bowel sounds normal, Soft, No tenderness  Rectal: Guaiac positive dark stool  Skin: Warm, Dry, No erythema, No rash.   Back: No tenderness, No CVA tenderness.  Musculoskeletal: Good range of motion in all major joints.  Neurologic: Alert,  No focal deficits noted.   Psychiatric: Affect normal    Results for orders placed or performed during the hospital encounter of 06/11/20    COD (ADULT)   Result Value Ref Range    ABO Grouping Only O     Rh Grouping Only POS     Antibody Screen-Cod NEG    CBC WITH DIFFERENTIAL   Result Value Ref Range    WBC 24.5 (H) 4.8 - 10.8 K/uL    RBC 2.93 (L) 4.70 - 6.10 M/uL    Hemoglobin 8.0 (L) 14.0 - 18.0 g/dL    Hematocrit 25.0 (L) 42.0 - 52.0 %    MCV 85.3 81.4 - 97.8 fL    MCH 27.3 27.0 - 33.0 pg    MCHC 32.0 (L) 33.7 - 35.3 g/dL    RDW 44.6 35.9 - 50.0 fL    Platelet Count 862 (H) 164 - 446 K/uL    MPV 8.7 (L) 9.0 - 12.9 fL    Neutrophils-Polys 88.80 (H) 44.00 - 72.00 %    Lymphocytes 5.50 (L) 22.00 - 41.00 %    Monocytes 4.00 0.00 - 13.40 %    Eosinophils 0.10 0.00 - 6.90 %    Basophils 0.30 0.00 - 1.80 %    Immature Granulocytes 1.30 (H) 0.00 - 0.90 %    Nucleated RBC 0.00 /100 WBC    Neutrophils (Absolute) 21.70 (H) 1.82 - 7.42 K/uL    Lymphs (Absolute) 1.35 1.00 - 4.80 K/uL    Monos (Absolute) 0.98 (H) 0.00 - 0.85 K/uL    Eos (Absolute) 0.03 0.00 - 0.51 K/uL    Baso (Absolute) 0.07 0.00 - 0.12 K/uL    Immature Granulocytes (abs) 0.33 (H) 0.00 - 0.11 K/uL    NRBC (Absolute) 0.00 K/uL   COMP METABOLIC PANEL   Result Value Ref Range    Sodium 131 (L) 135 - 145 mmol/L    Potassium 4.8 3.6 - 5.5 mmol/L    Chloride 93 (L) 96 - 112 mmol/L    Co2 23 20 - 33 mmol/L    Anion Gap 15.0 7.0 - 16.0    Glucose 147 (H) 65 - 99 mg/dL    Bun 21 8 - 22 mg/dL    Creatinine 0.96 0.50 - 1.40 mg/dL    Calcium 9.0 8.5 - 10.5 mg/dL    AST(SGOT) 22 12 - 45 U/L    ALT(SGPT) 22 2 - 50 U/L    Alkaline Phosphatase 85 30 - 99 U/L    Total Bilirubin 0.5 0.1 - 1.5 mg/dL    Albumin 3.2 3.2 - 4.9 g/dL    Total Protein 6.5 6.0 - 8.2 g/dL    Globulin 3.3 1.9 - 3.5 g/dL    A-G Ratio 1.0 g/dL   LIPASE   Result Value Ref Range    Lipase 26 11 - 82 U/L   PROTHROMBIN TIME   Result Value Ref Range    PT 15.6 (H) 12.0 - 14.6 sec    INR 1.21 (H) 0.87 - 1.13   APTT   Result Value Ref Range    APTT 21.4 (L) 24.7 - 36.0 sec   ESTIMATED GFR   Result Value Ref Range    GFR If  >60  >60 mL/min/1.73 m 2    GFR If Non African American >60 >60 mL/min/1.73 m 2   ABO Rh Confirm   Result Value Ref Range    ABO Rh Confirm O POS         RADIOLOGY/PROCEDURES  CT-ABDOMEN-PELVIS WITH   Final Result         1.  Large heterogeneous masslike hypodensity within the spleen which contains air and nonspecific speckled areas of increased density. This is contiguous with the greater curvature of the stomach with associated gastric wall thickening and with air    likely tracking from the stomach into this splenic abnormality. Findings are most concerning for underlying malignancy with air in the splenic mass raising concern for possible superinfection. Endoscopic evaluation and biopsy should be considered.   2.  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 Additional shoddy and mildly prominent retroperitoneal lymph nodes.      These findings were discussed with JACQUELIN POWERS on 6/11/2020 6:30 PM.                  DX-CHEST-PORTABLE (1 VIEW)   Final Result      1.  There is no acute cardiopulmonary process.            COURSE & MEDICAL DECISION MAKING  Pertinent Labs & Imaging studies reviewed. (See chart for details)      Patient presents with hematemesis and bright red blood and dark stool from his rectum.  Consider sent with a GI bleed.  He is established labs are obtained per protocol to including type and cross and coagulation studies.  Patient is started on IV Protonix drip and infusion.    Patient is tachycardic secondary to volume loss.  He must remain n.p.o. because of his GI bleed.  Therefore cannot receive oral fluids.  He is given IV fluids.    Patient given a liter of fluid for the above reasons.  He is reassessed after fluids and is improved.          The patient's labs show any pneumonia.  Start on fluids and Protonix.  I spoke with Dr. Mosley on-call for GI.  He will see the patient.  The patient states he has seen the GI consultants.    The patient has a white count of  24,000 which I think is a little bit out of portion with expect for a GI bleed.  Considering he has been having some pain I think it is wise to get a CT scan make sure there is no other etiology causing the symptoms.      The patient CT shows what appears to be a gastric mass that is perforated in the spleen.  There is may also be a superinfection.  This is discussed with the radiologist.    He also has some other findings concerning for metastatic disease.    Light of the patient's leukocytosis have added cultures and antibiotics.  I then spoke with the general surgeon Dr. Bernabe he will see the patient in consultation.  I have a page out to the hospitalist updated to the plan.  Patient is aware of these findings and is hospitalized in critical condition.      FINAL IMPRESSION  1. Upper GI bleed     2. Symptomatic anemia     3. Gastric mass     3.  Critical care time 35 minutes no procedures    2.   3.         Electronically signed by: Felix Little M.D., 6/11/2020 5:19 PM

## 2020-06-12 NOTE — H&P
Hospital Medicine History & Physical Note    Date of Service  6/11/2020    Primary Care Physician  Nacho Shea M.D.    Code Status  Full Code    Chief Complaint  Chief Complaint   Patient presents with   • Bloody Stools   • Blood in Vomit       History of Presenting Illness  65 y.o. male who presented 6/11/2020 with dark stools and abnormal labs.  Patient states in January he apparently began breathing fumes while working but did not know this until 5 weeks ago.  In March she was having trouble sleeping and then he developed a decreased appetite and nausea.  This morning with see his primary care provider, had labs drawn recently and was told he had an abnormal CBC.  They did a stool sample and thought there was blood.  Patient did complain of fatigue, did have dark stool with blood in it as well.  He then developed lightheadedness.  Later vomited and said that it was red.  Because of this, he presented to the emergency department.  He denies any abdominal pain.  I did discuss the case including labs and imaging with the ER physician.    Review of Systems  Review of Systems   Constitutional: Positive for malaise/fatigue. Negative for chills and fever.   HENT: Negative for congestion.    Respiratory: Negative for cough, sputum production, shortness of breath and stridor.    Cardiovascular: Negative for chest pain, palpitations and leg swelling.   Gastrointestinal: Positive for melena, nausea and vomiting. Negative for abdominal pain, constipation and diarrhea.   Genitourinary: Negative for dysuria and urgency.   Musculoskeletal: Negative for falls and myalgias.   Neurological: Positive for dizziness. Negative for tingling, loss of consciousness, weakness and headaches.   Psychiatric/Behavioral: Negative for depression and suicidal ideas.   All other systems reviewed and are negative.      Past Medical History   has a past medical history of Hypertension.    Surgical History  None    Family History  Prostate  cancer, esophageal cancer    Social History   reports that he has never smoked. He has never used smokeless tobacco. He reports that he does not drink alcohol or use drugs.    Allergies  No Known Allergies    Medications  Prior to Admission Medications   Prescriptions Last Dose Informant Patient Reported? Taking?   VITAMIN D PO   Yes Yes   Sig: Take  by mouth.   lisinopril-hydrochlorothiazide (PRINZIDE) 20-12.5 MG per tablet 6/10/2020 at Unknown time  Yes Yes   Sig: Take 1 Tab by mouth every day.      Facility-Administered Medications: None       Physical Exam  Temp:  [36.8 °C (98.2 °F)] 36.8 °C (98.2 °F)  Pulse:  [] 96  Resp:  [16] 16  BP: ()/(62-73) 115/70  SpO2:  [91 %-100 %] 91 %    Physical Exam  Vitals signs and nursing note reviewed.   Constitutional:       General: He is not in acute distress.     Appearance: He is well-developed. He is not toxic-appearing or diaphoretic.   HENT:      Head: Normocephalic and atraumatic.      Right Ear: External ear normal.      Left Ear: External ear normal.      Nose: Nose normal. No congestion or rhinorrhea.      Mouth/Throat:      Mouth: Mucous membranes are dry.      Pharynx: No oropharyngeal exudate.   Eyes:      General:         Right eye: No discharge.         Left eye: No discharge.   Neck:      Musculoskeletal: Normal range of motion and neck supple. No edema or erythema.      Trachea: No tracheal deviation.   Cardiovascular:      Rate and Rhythm: Normal rate and regular rhythm.      Heart sounds: No murmur. No friction rub. No gallop.    Pulmonary:      Effort: Pulmonary effort is normal. No respiratory distress.      Breath sounds: Normal breath sounds. No stridor. No wheezing or rales.   Chest:      Chest wall: No tenderness.   Abdominal:      General: Bowel sounds are normal. There is no distension.      Palpations: Abdomen is soft.      Tenderness: There is no abdominal tenderness.   Musculoskeletal: Normal range of motion.         General: No  tenderness.      Right lower leg: No edema.      Left lower leg: No edema.   Lymphadenopathy:      Cervical: No cervical adenopathy.   Skin:     General: Skin is warm and dry.      Coloration: Skin is not jaundiced.      Findings: No erythema or rash.   Neurological:      General: No focal deficit present.      Mental Status: He is alert and oriented to person, place, and time.      Cranial Nerves: No cranial nerve deficit.   Psychiatric:         Mood and Affect: Mood normal.         Behavior: Behavior normal.         Thought Content: Thought content normal.         Judgment: Judgment normal.         Laboratory:  Recent Labs     06/11/20  1553   WBC 24.5*   RBC 2.93*   HEMOGLOBIN 8.0*   HEMATOCRIT 25.0*   MCV 85.3   MCH 27.3   MCHC 32.0*   RDW 44.6   PLATELETCT 862*   MPV 8.7*     Recent Labs     06/11/20  1553   SODIUM 131*   POTASSIUM 4.8   CHLORIDE 93*   CO2 23   GLUCOSE 147*   BUN 21   CREATININE 0.96   CALCIUM 9.0     Recent Labs     06/11/20  1553   ALTSGPT 22   ASTSGOT 22   ALKPHOSPHAT 85   TBILIRUBIN 0.5   LIPASE 26   GLUCOSE 147*     Recent Labs     06/11/20  1553   APTT 21.4*   INR 1.21*     No results for input(s): NTPROBNP in the last 72 hours.      No results for input(s): TROPONINT in the last 72 hours.    Imaging:  CT-ABDOMEN-PELVIS WITH   Final Result         1.  Large heterogeneous masslike hypodensity within the spleen which contains air and nonspecific speckled areas of increased density. This is contiguous with the greater curvature of the stomach with associated gastric wall thickening and with air    likely tracking from the stomach into this splenic abnormality. Findings are most concerning for underlying malignancy with air in the splenic mass raising concern for possible superinfection. Endoscopic evaluation and biopsy should be considered.   2.  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 Additional shoddy and mildly prominent retroperitoneal lymph nodes.       These findings were discussed with JACQUELIN POWERS on 6/11/2020 6:30 PM.                  DX-CHEST-PORTABLE (1 VIEW)   Final Result      1.  There is no acute cardiopulmonary process.            Assessment/Plan:    Sepsis (HCC)- (present on admission)  Assessment & Plan  -This is Sepsis Present on admission  SIRS criteria identified on my evaluation include: Tachycardia, with heart rate greater than 90 BPM and Leukocytosis, with WBC greater than 12,000  Source is intra-abdominal  Sepsis protocol initiated  Fluid resuscitation ordered per protocol  IV antibiotics as appropriate for source of sepsis  While organ dysfunction may be noted elsewhere in this problem list or in the chart, degree of organ dysfunction does not meet CMS criteria for severe sepsis  -Start Rocephin and Flagyl  -Await culture results  -I did personally review her CT abdomen/pelvis, did note a large mass associated with the spleen and the stomach  -Radiology were also concerned about a possible superinfection  -Trend lactic acid  -Patient is at risk of worsening, does require close monitoring of his hemodynamics    GI bleed- (present on admission)  Assessment & Plan  -Likely considering how much his hemoglobin has recently dropped  -He does state he has had some dark stool with blood in it and then red vomitus  -Monitor for bleeding  -ERP did discuss the case with GI who will follow along, await additional recommendations  -Closely monitor for signs of hemodynamic instability    Acute blood loss anemia- (present on admission)  Assessment & Plan  -Presumed acute blood loss from GI bleeding  -Trend hemoglobin frequently  -Transfuse as needed    Hyperglycemia- (present on admission)  Assessment & Plan  -Mild, no need for coverage    Hyponatremia- (present on admission)  Assessment & Plan  -Likely due to dehydration  -Start IV fluids  -Repeat BMP in the morning    Thrombocytosis (HCC)- (present on admission)  Assessment & Plan  -Likely due to  anemia, possibly dehydration  -Repeat CBC in the morning

## 2020-06-12 NOTE — CONSULTS
Gastroenterology Consult Note:    Nicola Ray M.D.  Date & Time note created:    6/11/2020   9:04 PM     Patient ID:  Name:             Napoleon Zendejas    YOB: 1955  Age:                 65 y.o.  male  MRN:               [id number]    Referring MD:  Dr. Little                                                             Chief Complaint(s):      Bloody stool and hematemesis    History of Present Illness:    This is a very pleasant 65 y.o. male with the past medical history as listed below.    He presented to the emergency department for evaluation of bloody stool and bloody vomit.  He is been having vague abdominal discomfort and early satiety and decreased appetite for some time.  He does take ibuprofen sometimes but not daily.  Denies any blood thinner use.  Denies any heavy alcohol use.  States he has been having some episodes of dark stool and had some black stool bloody stool today.  Also had hematemesis where he states he vomited about a cup of bright red blood.  Denies any blood thinner use.  No chest pain cough shortness of breath no fevers or chills.  He denied LUQ pain. He does have about 34 lbs BWL in the past few months.     Otherwise the patient is doing fine without complaints of fever/chills/weight loss/appetite change/dysphagia/odynophagia/heartburn/bloating/constipation/diarrhea or abdominal pain.    Review of Systems:      Constitutional: Denies fevers, pos weight loss  Eyes: Denies changes in vision, jaundice  Ears/Nose/Throat/Mouth: Denies nasal congestion or sore throat   Cardiovascular: Denies chest pain or palpitations   Respiratory: Denies shortness of breath, denies cough  Gastrointestinal/Hepatic: Denies abdominal pain, nausea, vomiting, diarrhea, constipation; pos GI bleeding   Genitourinary: Denies dysuria or frequency  Musculoskeletal/Rheum: Denies  joint pain and swelling, edema  Skin: Denies rash  Neurological: Denies headache, confusion, memory loss or focal  weakness/parasthesias  Psychiatric: denies mood disorder   Endocrine: Chayo thyroid problems  Heme/Oncology/Lymph Nodes: Denies enlarged lymph nodes, denies brusing or known bleeding disorder  All other systems were reviewed and are negative (AMA/CMS criteria)            ROS    Past Medical History:   Past Medical History:   Diagnosis Date   • Hypertension      There are no active hospital problems to display for this patient.      Past Surgical History:  History reviewed. No pertinent surgical history.    Hospital Medications:  Current Facility-Administered Medications   Medication Dose Frequency Provider Last Rate Last Dose   • senna-docusate (PERICOLACE or SENOKOT S) 8.6-50 MG per tablet 2 Tab  2 Tab BID Sebastian Lambert D.O.        And   • polyethylene glycol/lytes (MIRALAX) PACKET 1 Packet  1 Packet QDAY PRN Sebastian Lambert D.O.        And   • magnesium hydroxide (MILK OF MAGNESIA) suspension 30 mL  30 mL QDAY PRN Sebastian Lambert D.O.        And   • bisacodyl (DULCOLAX) suppository 10 mg  10 mg QDAY PRN RUTHIE BowersO.       • lactated ringers infusion (BOLUS)  1,000 mL Once PRN Sebastian Lambert D.O.       • lactated ringers infusion   Continuous MINDY Bowers.KRISTIAN.       • lactated ringers infusion (BOLUS): BMI less than or equal to 30  30 mL/kg Once PRN MINDY Bowers.O.       • acetaminophen (TYLENOL) tablet 650 mg  650 mg Q6HRS PRN MINDY Bowers.O.       • [START ON 6/12/2020] cefTRIAXone (ROCEPHIN) 2 g in  mL IVPB  2 g Q24HRS RUTHIE BowersO.       • metroNIDAZOLE (FLAGYL) IVPB 500 mg  500 mg Q8HRS MINDY Bowers.O.       • enalaprilat (VASOTEC) injection 1.25 mg  1.25 mg Q6HRS PRN RUTHIE BowersO.       • ondansetron (ZOFRAN) syringe/vial injection 4 mg  4 mg Q4HRS PRN MINDY Bowers.O.       • ondansetron (ZOFRAN ODT) dispertab 4 mg  4 mg Q4HRS PRN RUTHIE BowersO.       • promethazine (PHENERGAN) tablet 12.5-25 mg  12.5-25 mg Q4HRS PRN Sebastian Lambert,  RUTHIEO.       • promethazine (PHENERGAN) suppository 12.5-25 mg  12.5-25 mg Q4HRS PRN Sebastian Lambert D.O.       • prochlorperazine (COMPAZINE) injection 5-10 mg  5-10 mg Q4HRS PRN Sebastian Lambert D.O.       • pantoprazole (PROTONIX) 80 mg in  mL Infusion  8 mg/hr Continuous Sebastian Lambert D.O.       Last reviewed on 6/11/2020  5:06 PM by Nelda Summers      Current Outpatient Medications:  Medications Prior to Admission   Medication Sig Dispense Refill Last Dose   • lisinopril-hydrochlorothiazide (PRINZIDE) 20-12.5 MG per tablet Take 1 Tab by mouth every day.   6/10/2020 at Unknown time   • VITAMIN D PO Take  by mouth.          Medication Allergy:  No Known Allergies    Family History:  History reviewed. No pertinent family history.    Social History:  Social History     Socioeconomic History   • Marital status: Single     Spouse name: Not on file   • Number of children: Not on file   • Years of education: Not on file   • Highest education level: Not on file   Occupational History   • Not on file   Social Needs   • Financial resource strain: Not on file   • Food insecurity     Worry: Not on file     Inability: Not on file   • Transportation needs     Medical: Not on file     Non-medical: Not on file   Tobacco Use   • Smoking status: Never Smoker   • Smokeless tobacco: Never Used   Substance and Sexual Activity   • Alcohol use: Never     Frequency: Never   • Drug use: Never   • Sexual activity: Not on file   Lifestyle   • Physical activity     Days per week: Not on file     Minutes per session: Not on file   • Stress: Not on file   Relationships   • Social connections     Talks on phone: Not on file     Gets together: Not on file     Attends Religion service: Not on file     Active member of club or organization: Not on file     Attends meetings of clubs or organizations: Not on file     Relationship status: Not on file   • Intimate partner violence     Fear of current or ex partner: Not on file      Emotionally abused: Not on file     Physically abused: Not on file     Forced sexual activity: Not on file   Other Topics Concern   • Not on file   Social History Narrative   • Not on file       Physical Exam:  Weight/BMI: Body mass index is 29.16 kg/m².  /66   Pulse 99   Temp 36.8 °C (98.2 °F) (Temporal)   Resp 16   Ht 1.829 m (6')   Wt 97.5 kg (215 lb)   SpO2 95%   Vitals:    06/11/20 1800 06/11/20 1900 06/11/20 1930 06/11/20 2000   BP: 110/63 (!) 94/62 115/70 104/66   Pulse: (!) 106 95 96 99   Resp:   16    Temp:       TempSrc:       SpO2: 100% 96% 91% 95%   Weight:       Height:         Oxygen Therapy:  Pulse Oximetry: 95 %, O2 (LPM): 0, O2 Delivery Device: None - Room Air    Intake/Output Summary (Last 24 hours) at 6/11/2020 2104  Last data filed at 6/11/2020 2003  Gross per 24 hour   Intake 1100 ml   Output --   Net 1100 ml     Physical Exam     Constitutional:   Well developed, well nourished, no acute distress. Chronic ill looking  HEENT:  Normocephalic, Atraumatic, Conjunctiva pale, Sclera not icteric, Oropharynx moist mucous membranes, No oral exudates, Nose normal.  No thyromegaly.  Neck:  Normal range of motion, No cervical tenderness,  no JVD.  Chest/Lungs:  Symmetric expansion, no spider angioma, breath sounds clear to auscultation bilaterally,  no crackles, no wheezing.   Cardiovascular:  Normal heart rate, Normal rhythm, No murmurs, No rubs, No gallops.    Abdomen: Bowel sounds normal, Soft, No tenderness, No guarding, No rebound, No masses, No hepatosplenomegaly.  Extremities: No cyanosis/clubbing/edema/palmar erythema/flapping tremor  Skin: Warm, Dry, No erythema, No rash, no induration.    MDM (Data Review):     Records reviewed and summarized in current documentation    Lab Data Review:  Recent Results (from the past 24 hour(s))   COD (ADULT)    Collection Time: 06/11/20  3:53 PM   Result Value Ref Range    ABO Grouping Only O     Rh Grouping Only POS     Antibody Screen-Cod NEG     CBC WITH DIFFERENTIAL    Collection Time: 06/11/20  3:53 PM   Result Value Ref Range    WBC 24.5 (H) 4.8 - 10.8 K/uL    RBC 2.93 (L) 4.70 - 6.10 M/uL    Hemoglobin 8.0 (L) 14.0 - 18.0 g/dL    Hematocrit 25.0 (L) 42.0 - 52.0 %    MCV 85.3 81.4 - 97.8 fL    MCH 27.3 27.0 - 33.0 pg    MCHC 32.0 (L) 33.7 - 35.3 g/dL    RDW 44.6 35.9 - 50.0 fL    Platelet Count 862 (H) 164 - 446 K/uL    MPV 8.7 (L) 9.0 - 12.9 fL    Neutrophils-Polys 88.80 (H) 44.00 - 72.00 %    Lymphocytes 5.50 (L) 22.00 - 41.00 %    Monocytes 4.00 0.00 - 13.40 %    Eosinophils 0.10 0.00 - 6.90 %    Basophils 0.30 0.00 - 1.80 %    Immature Granulocytes 1.30 (H) 0.00 - 0.90 %    Nucleated RBC 0.00 /100 WBC    Neutrophils (Absolute) 21.70 (H) 1.82 - 7.42 K/uL    Lymphs (Absolute) 1.35 1.00 - 4.80 K/uL    Monos (Absolute) 0.98 (H) 0.00 - 0.85 K/uL    Eos (Absolute) 0.03 0.00 - 0.51 K/uL    Baso (Absolute) 0.07 0.00 - 0.12 K/uL    Immature Granulocytes (abs) 0.33 (H) 0.00 - 0.11 K/uL    NRBC (Absolute) 0.00 K/uL   COMP METABOLIC PANEL    Collection Time: 06/11/20  3:53 PM   Result Value Ref Range    Sodium 131 (L) 135 - 145 mmol/L    Potassium 4.8 3.6 - 5.5 mmol/L    Chloride 93 (L) 96 - 112 mmol/L    Co2 23 20 - 33 mmol/L    Anion Gap 15.0 7.0 - 16.0    Glucose 147 (H) 65 - 99 mg/dL    Bun 21 8 - 22 mg/dL    Creatinine 0.96 0.50 - 1.40 mg/dL    Calcium 9.0 8.5 - 10.5 mg/dL    AST(SGOT) 22 12 - 45 U/L    ALT(SGPT) 22 2 - 50 U/L    Alkaline Phosphatase 85 30 - 99 U/L    Total Bilirubin 0.5 0.1 - 1.5 mg/dL    Albumin 3.2 3.2 - 4.9 g/dL    Total Protein 6.5 6.0 - 8.2 g/dL    Globulin 3.3 1.9 - 3.5 g/dL    A-G Ratio 1.0 g/dL   LIPASE    Collection Time: 06/11/20  3:53 PM   Result Value Ref Range    Lipase 26 11 - 82 U/L   PROTHROMBIN TIME    Collection Time: 06/11/20  3:53 PM   Result Value Ref Range    PT 15.6 (H) 12.0 - 14.6 sec    INR 1.21 (H) 0.87 - 1.13   APTT    Collection Time: 06/11/20  3:53 PM   Result Value Ref Range    APTT 21.4 (L) 24.7 - 36.0 sec    ESTIMATED GFR    Collection Time: 06/11/20  3:53 PM   Result Value Ref Range    GFR If African American >60 >60 mL/min/1.73 m 2    GFR If Non African American >60 >60 mL/min/1.73 m 2   ABO Rh Confirm    Collection Time: 06/11/20  4:44 PM   Result Value Ref Range    ABO Rh Confirm O POS    Lactic Acid -STAT Once    Collection Time: 06/11/20  8:30 PM   Result Value Ref Range    Lactic Acid 1.5 0.5 - 2.0 mmol/L       MDM (Assessment and Plan):     There are no active hospital problems to display for this patient.      Imaging/Procedures Review:    CT-ABDOMEN-PELVIS WITH   Final Result         1.  Large heterogeneous masslike hypodensity within the spleen which contains air and nonspecific speckled areas of increased density. This is contiguous with the greater curvature of the stomach with associated gastric wall thickening and with air    likely tracking from the stomach into this splenic abnormality. Findings are most concerning for underlying malignancy with air in the splenic mass raising concern for possible superinfection. Endoscopic evaluation and biopsy should be considered.   2.  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 Additional shoddy and mildly prominent retroperitoneal lymph nodes.      These findings were discussed with JACQUELIN POWERS on 6/11/2020 6:30 PM.                  DX-CHEST-PORTABLE (1 VIEW)   Final Result      1.  There is no acute cardiopulmonary process.          Assessment  - Anemia  - Hematemesis  - Bloody stool  - Suspect gastric mass  - H/o colon polyps  - Hypertension    Plan  -  Admission to the hospital by the hospitalist with gastroenterology following closely. Hospitalist to manage comorbid conditions.  -  Pantoprazole 80 mg IV bolus followed by 8 mg IV per hour.  -  Cl liq diet, no red  -  NPO after midnight  -  Monitor H/H and vitals. Serial hemoglobin and hematocrit q 6-8 hours with transfusions as needed per primary team for hemoglobin less  than 7 or hematocrit less than 21.  -  EGD with hemostasis in the morning  - Risks, benefits, and alternatives were discussed with patient. Consenting persons were given an opportunity to ask questions and discuss other options. Risks including but not limited to failed or incomplete endoscopy, ineffective therapy, perforation, infection, bleeding, missed lesion(s), cardiac and/or pulmonary event, aspiration, stroke, possible need for surgery, hospitalization possibly prolonged, discomfort, unsuccessful and/or incomplete procedure, possible need for repeat procedures and/or additional testings, damage to adjacent organs and/or vascular structures, medication reaction, disability, death, and other adverse events possibly life-threatening. Discussion was undertaken with Layman's terms. Consenting persons stated understanding and acceptance of these risks, and wished to proceed. Consent was given in clear state of mind. All questions were answered.      Thank you very much for allowing me to participate in the care of your patient.  Please feel free to contact me anytime at [phone].     Nicola Ray M.D.    Core Quality Measures   Reviewed items::  Labs, Medications and Radiology reports reviewed

## 2020-06-12 NOTE — PROGRESS NOTES
Spoke with Dr. Verma. Per MD, will see if patient has any other procedure scheduled for today and if not, will restart patient's diet.

## 2020-06-12 NOTE — PROGRESS NOTES
JENNIFER Zapien called to inform this RN that Lizz from Lab called with critical result of Hgb 5.9 at 0320. Critical lab result read back to JENNIFER Zapien.   Dr. Lambert notified of critical lab result at 0325.  Critical lab result read back by Dr. Lambert and received order to transfuse (1) unit PRBC's, recheck Hgb after first transfusion, and transfuse additional unit if Hgb <7.

## 2020-06-12 NOTE — ANESTHESIA PREPROCEDURE EVALUATION
Relevant Problems   No relevant active problems       Physical Exam    Airway   Mallampati: II  TM distance: >3 FB  Neck ROM: full       Cardiovascular - normal exam  Rhythm: regular  Rate: normal  (-) murmur     Dental - normal exam           Pulmonary - normal exam  Breath sounds clear to auscultation     Abdominal    Neurological - normal exam                 Anesthesia Plan    ASA 4   ASA physical status 4 criteria: sepsis    Plan - general       Airway plan will be ETT        Induction: intravenous    Postoperative Plan: Postoperative administration of opioids is intended.    Pertinent diagnostic labs and testing reviewed    Informed Consent:    Anesthetic plan and risks discussed with patient.    Use of blood products discussed with: patient whom consented to blood products.

## 2020-06-12 NOTE — CONSULTS
DATE OF CONSULTATION:  6/12/2020     REFERRING PHYSICIAN:   Sebastian Lambert M.D.     CONSULTING PHYSICIAN:  Montse Moura M.D.     REASON FOR CONSULTATION:  Mass in stomach  .   I have been asked by  to see the patient in surgical consultation for evaluation of gastric mass that is invading into the spleen..    HISTORY OF PRESENT ILLNESS: The patient is a 65 year-old White man who presents to the Emergency Department with a 3- month history of off and on nausea abdominal pain. He denies any abdominal pain.  Starting having hematemesis and black stools yesterday which prompted ER visit. CT scan shows large mass in stomach eroding into spleen.     PAST MEDICAL HISTORY:  has a past medical history of Hypertension.    PAST SURGICAL HISTORY: patient denies any surgical history     ALLERGIES: No Known Allergies     CURRENT MEDICATIONS:   Home Medications     Reviewed by Nya Verduzco R.N. (Registered Nurse) on 06/12/20 at 0922  Med List Status: Unable to Obtain   Medication Last Dose Status   lisinopril-hydrochlorothiazide (PRINZIDE) 20-12.5 MG per tablet 6/10/2020 Active   VITAMIN D PO  Active                FAMILY HISTORY: History reviewed. No pertinent family history.    SOCIAL HISTORY:   Social History     Tobacco Use   • Smoking status: Never Smoker   • Smokeless tobacco: Never Used   Substance and Sexual Activity   • Alcohol use: Never     Frequency: Never   • Drug use: Never   • Sexual activity: Not on file       REVIEW OF SYSTEMS: Comprehensive review of systems is negative with the exception of the aforementioned HPI, PMH, and PSH bullets in accordance with CMS guidelines.     PHYSICAL EXAMINATION:   General Appearance: The patient is a pleasant .  VITAL SIGNS: /64   Pulse 74   Temp 36.4 °C (97.5 °F) (Temporal)   Resp 18   Ht 1.829 m (6')   Wt 97.5 kg (215 lb)   SpO2 95%      HEAD AND NECK: Demonstrates symmetric, reactive pupils.     NECK: Supple.    CHEST:    Inspection: Unlabored  respirations, no intercostal retractions, paradoxical motion, or accessory muscle use.    CARDIOVASCULAR:   Inspection: The skin is warm.    ABDOMEN:   Inspection: Abdominal inspection reveals no abrasions, contusions, lacerations or penetrating wounds.   Palpation: Palpation is remarkable for no significant tenderness, guarding, or peritoneal findings. No abdominal wall hernias.    EXTREMITIES:   Examination of the upper and lower extremities demonstrates No cyanosis, edema, or clubbing of the nails.  NEUROLOGIC:   Neurologic examination reveals no focal deficits noted.      LABORATORY VALUES:   Recent Labs     06/11/20  1553 06/12/20  0216 06/12/20  0500   WBC 24.5* 10.6  --    RBC 2.93* 2.15*  --    HEMOGLOBIN 8.0* 5.9* 6.7*   HEMATOCRIT 25.0* 18.9*  --    MCV 85.3 87.9  --    MCH 27.3 27.4  --    MCHC 32.0* 31.2*  --    RDW 44.6 44.9  --    PLATELETCT 862* 529*  --    MPV 8.7* 8.6*  --      Recent Labs     06/11/20  1553 06/12/20  0216   SODIUM 131* 134*   POTASSIUM 4.8 4.0   CHLORIDE 93* 100   CO2 23 24   GLUCOSE 147* 94   BUN 21 24*   CREATININE 0.96 0.87   CALCIUM 9.0 8.4*     Recent Labs     06/11/20  1553 06/12/20  0216   ASTSGOT 22  --    ALTSGPT 22  --    TBILIRUBIN 0.5  --    ALKPHOSPHAT 85  --    GLOBULIN 3.3  --    INR 1.21* 1.13     Recent Labs     06/11/20  1553 06/12/20  0216   APTT 21.4*  --    INR 1.21* 1.13        IMAGING:   CT-ABDOMEN-PELVIS WITH   Final Result         1.  Large heterogeneous masslike hypodensity within the spleen which contains air and nonspecific speckled areas of increased density. This is contiguous with the greater curvature of the stomach with associated gastric wall thickening and with air    likely tracking from the stomach into this splenic abnormality. Findings are most concerning for underlying malignancy with air in the splenic mass raising concern for possible superinfection. Endoscopic evaluation and biopsy should be considered.   2.  Mildly enlarged left  para-aortic lymph node concerning for jaymie metastasis. Additional shoddy and mildly prominent retroperitoneal lymph nodes.      These findings were discussed with JACQUELIN POWERS on 6/11/2020 6:30 PM.                  DX-CHEST-PORTABLE (1 VIEW)   Final Result      1.  There is no acute cardiopulmonary process.          IMPRESSION AND PLAN:  GI bleed- (present on admission)  Assessment & Plan  Large gastric mass invading into spleen  6/12 - EGD    Await EGD evaluation. May need ex lap, partial gastrectomy, splenectomy.      ACS NSQIP Surgical Risk Calculator       ____________________________________     Montse Moura M.D.    DD: 6/12/2020  9:45 AM

## 2020-06-12 NOTE — ANESTHESIA PROCEDURE NOTES
Airway    Date/Time: 6/12/2020 9:34 AM  Performed by: Jabier Singletary D.O.  Authorized by: Jabier Singletary D.O.     Location:  OR  Urgency:  Elective  Indications for Airway Management:  Anesthesia      Spontaneous Ventilation: absent    Sedation Level:  Deep  Preoxygenated: Yes    Patient Position:  Sniffing  Final Airway Type:  Endotracheal airway  Final Endotracheal Airway:  ETT  Cuffed: Yes    Technique Used for Successful ETT Placement:  Direct laryngoscopy    Insertion Site:  Oral  Blade Type:  Jennifer  Laryngoscope Blade/Videolaryngoscope Blade Size:  3  ETT Size (mm):  7.5  Measured from:  Lips  ETT to Lips (cm):  22  Placement Verified by: auscultation and capnometry    Cormack-Lehane Classification:  Grade I - full view of glottis  Number of Attempts at Approach:  1

## 2020-06-12 NOTE — ASSESSMENT & PLAN NOTE
-Likely from GI bleed complicated by gastric mass  -Patient is hemodynamically stable and asymptomatic  -Will continue to trend with CBC  -Transfuse to maintain hemoglobin greater than 7.  -Status post 1 unit of packed blood cells on 6/12/2020 and 6/13/2021

## 2020-06-12 NOTE — CARE PLAN
Problem: Communication  Goal: The ability to communicate needs accurately and effectively will improve  Outcome: PROGRESSING AS EXPECTED  Intervention: Felt patient and significant other/support system to call light to alert staff of needs  Note: Patient and family educated regarding visiting hours and restrictions, use of call light system, and plan of care. Patient and family expressed verbal understanding of information provided. Allowed time for questions and concerns to be voiced.     Problem: Knowledge Deficit  Goal: Knowledge of disease process/condition, treatment plan, diagnostic tests, and medications will improve  Outcome: PROGRESSING AS EXPECTED  Intervention: Explain information regarding disease process/condition, treatment plan, diagnostic tests, and medications and document in education  Note: Patient educated regarding current plan of care including treatments, diagnostics, medications, and the need to be NPO after midnight for scheduled procedures. Patient agreeable to current plan of care. All questions and concerns addressed at this time.

## 2020-06-12 NOTE — ASSESSMENT & PLAN NOTE
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 Zeny James consulted; PET scan, bone marrow study, and echo and follow-up as outpatient

## 2020-06-12 NOTE — ANESTHESIA TIME REPORT
Anesthesia Start and Stop Event Times     Date Time Event    6/12/2020 0915 Ready for Procedure     0925 Anesthesia Start     0954 Anesthesia Stop        Responsible Staff  06/12/20    Name Role Begin End    Jabier Singletary D.O. Anesth 0925 0954        Preop Diagnosis (Free Text):  Pre-op Diagnosis     Bloody stool and hematemesis        Preop Diagnosis (Codes):    Post op Diagnosis  Hematemesis      Premium Reason  Non-Premium    Comments:

## 2020-06-12 NOTE — ANESTHESIA POSTPROCEDURE EVALUATION
Patient: Napoleon Zendejas    Procedure Summary     Date:  06/12/20 Room / Location:  [facility] ROOM 26 / SURGERY SAME DAY [facility]    Anesthesia Start:  0925 Anesthesia Stop:  0954    Procedure:  GASTROSCOPY, WITH BIOPSY (Esophagus) Diagnosis:  (Perforated Gastric Ulcer & Gastric Tumor)    Surgeon:  Nicola Ray M.D. Responsible Provider:  Jabier Singletary D.O.    Anesthesia Type:  general ASA Status:  4          Final Anesthesia Type: general  Last vitals  BP   Blood Pressure: (!) 91/55    Temp   36.7 °C (98 °F)    Pulse   Pulse: 73   Resp   16    SpO2   95 %      Anesthesia Post Evaluation    Patient location during evaluation: PACU  Patient participation: complete - patient participated  Level of consciousness: awake and alert  Pain score: 0    Airway patency: patent  Anesthetic complications: no  Cardiovascular status: hemodynamically stable  Respiratory status: acceptable  Hydration status: euvolemic    PONV: none           Nurse Pain Score: 0 (NPRS)

## 2020-06-12 NOTE — PROGRESS NOTES
[facility] Medicine Daily Progress Note    Date of Service  6/12/2020    Chief Complaint  65 y.o. male admitted 6/11/2020 with bloody stools, hematemesis    Hospital Course    Mr. Napoleon Zendejas is a 65 y.o. male who presented on 6/11/2020 with dark stools.  Morning of admission the patient presented to his primary care provider and was told that he had an abnormal CBC.  Stool sample was positive for blood.  Patient does report fatigue and dark bloody stools.  He then developed lightheadedness and an episode of hematemesis.  He does report vague abdominal pain, early satiety, and decreased appetite for weeks.  Does not drink heavy alcohol and no blood thinners.  This led to his presentation to the emergency room.  Patient was admitted with sepsis secondary to abdominal infection.  Started on ceftriaxone and metronidazole.  CT abdomen was also concerning for gastric mass.  GI was consulted Dr. Ray.  The patient was started on a pantoprazole drip.  EGD demonstrated a large cratered stomach ulcer in the fundus/cardia area status post cold forceps biopsy.  There was also another cratered ulcer with perforation from tumor growth surgery.  General surgery Dr. Moura was consulted.        Interval Problem Update  Patient was seen and examined at bedside.  I have personally reviewed vitals, labs, and imaging.    6/12.  Afebrile.  Episodes of tachycardia.  Patient has been hypotensive.  Hemoglobin this morning was 5.9.  Patient tolerated blood transfusion.  Status post EGD with findings as above.  I did speak with general surgery Dr. Moura and plan for surgery tomorrow morning.  Trend hemoglobin.  Denies fever, chills, chest pain, shortness of breath.  Abdominal pain has resolved.      Consultants/Specialty  Gastroenterology  General surgery    Code Status  Full    Disposition  To be determined    Review of Systems  Review of Systems   Constitutional: Negative for chills and fever.   HENT: Negative for congestion and sore  throat.    Eyes: Negative for blurred vision.   Respiratory: Negative for cough and shortness of breath.    Cardiovascular: Negative for chest pain, palpitations and leg swelling.   Gastrointestinal: Positive for abdominal pain, blood in stool and melena. Negative for constipation, diarrhea, nausea and vomiting.   Genitourinary: Negative for dysuria, frequency and urgency.   Musculoskeletal: Negative for falls.   Skin: Negative for rash.   Neurological: Negative for dizziness, weakness and headaches.   Psychiatric/Behavioral: Negative for depression. The patient is not nervous/anxious.    All other systems reviewed and are negative.       Physical Exam  Temp:  [36.3 °C (97.3 °F)-37.1 °C (98.7 °F)] 37 °C (98.6 °F)  Pulse:  [] 74  Resp:  [16-20] 16  BP: ()/(59-73) 97/62  SpO2:  [91 %-100 %] 94 %    Physical Exam  Vitals signs and nursing note reviewed.   Constitutional:       General: He is not in acute distress.     Appearance: Normal appearance.   HENT:      Head: Normocephalic and atraumatic.      Right Ear: External ear normal.      Left Ear: External ear normal.      Nose: Nose normal.      Mouth/Throat:      Mouth: Mucous membranes are moist.      Pharynx: Oropharynx is clear.   Eyes:      Extraocular Movements: Extraocular movements intact.      Conjunctiva/sclera: Conjunctivae normal.   Neck:      Musculoskeletal: Normal range of motion and neck supple.   Cardiovascular:      Rate and Rhythm: Normal rate and regular rhythm.      Pulses: Normal pulses.      Heart sounds: Normal heart sounds. No murmur. No friction rub. No gallop.    Pulmonary:      Effort: Pulmonary effort is normal. No respiratory distress.      Breath sounds: Normal breath sounds. No wheezing or rales.   Chest:      Chest wall: No tenderness.   Abdominal:      General: Abdomen is flat. Bowel sounds are normal. There is no distension.      Palpations: Abdomen is soft. There is no mass.      Tenderness: There is no abdominal  tenderness. There is no guarding.   Musculoskeletal: Normal range of motion.   Skin:     General: Skin is warm.      Capillary Refill: Capillary refill takes less than 2 seconds.   Neurological:      General: No focal deficit present.      Mental Status: He is alert and oriented to person, place, and time. Mental status is at baseline.      Cranial Nerves: No cranial nerve deficit.      Motor: No weakness.   Psychiatric:         Mood and Affect: Mood normal.         Behavior: Behavior normal.         Fluids    Intake/Output Summary (Last 24 hours) at 6/12/2020 0758  Last data filed at 6/12/2020 0715  Gross per 24 hour   Intake 1530 ml   Output --   Net 1530 ml       Laboratory  Recent Labs     06/11/20  1553 06/12/20  0216 06/12/20  0500   WBC 24.5* 10.6  --    RBC 2.93* 2.15*  --    HEMOGLOBIN 8.0* 5.9* 6.7*   HEMATOCRIT 25.0* 18.9*  --    MCV 85.3 87.9  --    MCH 27.3 27.4  --    MCHC 32.0* 31.2*  --    RDW 44.6 44.9  --    PLATELETCT 862* 529*  --    MPV 8.7* 8.6*  --      Recent Labs     06/11/20  1553 06/12/20  0216   SODIUM 131* 134*   POTASSIUM 4.8 4.0   CHLORIDE 93* 100   CO2 23 24   GLUCOSE 147* 94   BUN 21 24*   CREATININE 0.96 0.87   CALCIUM 9.0 8.4*     Recent Labs     06/11/20  1553 06/12/20  0216   APTT 21.4*  --    INR 1.21* 1.13               Imaging  CT-ABDOMEN-PELVIS WITH   Final Result         1.  Large heterogeneous masslike hypodensity within the spleen which contains air and nonspecific speckled areas of increased density. This is contiguous with the greater curvature of the stomach with associated gastric wall thickening and with air    likely tracking from the stomach into this splenic abnormality. Findings are most concerning for underlying malignancy with air in the splenic mass raising concern for possible superinfection. Endoscopic evaluation and biopsy should be considered.   2.  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 Additional shoddy and mildly  prominent retroperitoneal lymph nodes.      These findings were discussed with JACQUELIN POWERS on 6/11/2020 6:30 PM.                  DX-CHEST-PORTABLE (1 VIEW)   Final Result      1.  There is no acute cardiopulmonary process.           Assessment/Plan  Sepsis (HCC)- (present on admission)  Assessment & Plan  -This is Sepsis Present on admission  SIRS criteria identified on my evaluation include: Tachycardia, with heart rate greater than 90 BPM and Leukocytosis, with WBC greater than 12,000  Source is intra-abdominal  Sepsis protocol initiated  Fluid resuscitation ordered per protocol  IV antibiotics as appropriate for source of sepsis  While organ dysfunction may be noted elsewhere in this problem list or in the chart, degree of organ dysfunction does not meet CMS criteria for severe sepsis  -Start ceftriaxone and metronidazole  -Await culture results  -I did personally review her CT abdomen/pelvis, did note a large mass associated with the spleen and the stomach  -Radiology were also concerned about a possible superinfection  Lactic acid has remained within normal range.  -Patient is at risk of worsening, does require close monitoring of his hemodynamics    EGD confirms likely gastric mass with perforation as source of infection    GI bleed- (present on admission)  Assessment & Plan  -Likely considering how much his hemoglobin has recently dropped  -He does state he has had some dark stool with blood in it and then red vomitus  -Monitor for bleeding  -ERP did discuss the case with GI who will follow along, await additional recommendations  -Closely monitor for signs of hemodynamic instability    That is post EGD which showed no active bleeding.  Ulcer with adherent clot as noted above.  Continue pantoprazole for now.    Acute blood loss anemia- (present on admission)  Assessment & Plan  Likely from GI bleed versus gastric mass  Patient is hemodynamically stable and asymptomatic  Will continue to trend with  CBC  Transfuse to maintain hemoglobin greater than 7.  Status post 1 unit of packed blood cells on 6/12/2020  Results from last 7 days   Lab Units 06/12/20  1127 06/12/20  0500 06/12/20  0216 06/11/20  1553   HGB 1503 g/dL 7.3* 6.7* 5.9* 8.0*   HCT 1504 %  --   --  18.9* 25.0*   MCV 1505 fL  --   --  87.9 85.3     Folate -Folic Acid   Date Value Ref Range Status   06/12/2020 11.0 >4.0 ng/mL Final     Vitamin B12 -True Cobalamin   Date Value Ref Range Status   06/12/2020 265 211 - 911 pg/mL Final     Ferritin   Date Value Ref Range Status   06/12/2020 546.0 (H) 22.0 - 322.0 ng/mL Final     Iron   Date Value Ref Range Status   06/12/2020 31 (L) 50 - 180 ug/dL Final     Total Iron Binding   Date Value Ref Range Status   06/12/2020 162 (L) 250 - 450 ug/dL Final     % Saturation   Date Value Ref Range Status   06/12/2020 19 15 - 55 % Final         Gastric mass- (present on admission)  Assessment & Plan  Seen on CT and on exam with EGD.  General surgery Dr. Moura is following we will plan for surgery tomorrow 6/13.    Hyperglycemia- (present on admission)  Assessment & Plan  -Mild, no need for coverage    Hyponatremia- (present on admission)  Assessment & Plan  Improving  -Likely due to dehydration  -Start IV fluids  -Repeat BMP in the morning    Thrombocytosis (HCC)- (present on admission)  Assessment & Plan  -Likely due to anemia, possibly dehydration  -Repeat CBC in the morning    Gastrointestinal prophylaxis: Pantoprazole drip  Antibiotics: Ceftriaxone and metronidazole  Diet Order Diabetic  Diet NPO  Code status: Full  Prognosis: Guarded  Risk: The Patient is at HIGH risk for complications and decompensation secondary to his multiple cormorbidities including symptomatic anemia from acute blood loss from GI bleed versus gastric mass.  Abdominal pain secondary to gastric mass complicated by perforation requiring IV pain medication and surgery.  Sepsis secondary to GI infection.  Acute anemia from blood loss requiring  blood transfusion.    I have personally reviewed notes, labs, vitals, imaging.  I discussed the plan of care with bedside RN as well as on multidisciplinary rounds    VTE prophylaxis: Hold off on anticoagulation in the setting of acute GI bleed requiring blood transfusion

## 2020-06-12 NOTE — ASSESSMENT & PLAN NOTE
-This is Sepsis Present on admission  SIRS criteria identified on my evaluation include: Tachycardia, with heart rate greater than 90 BPM and Leukocytosis, with WBC greater than 12,000  -Source is intra-abdominal  -Sepsis protocol initiated  -Fluid resuscitation ordered per protocol  -IV antibiotics as appropriate for source of sepsis  -While organ dysfunction may be noted elsewhere in this problem list or in the chart, degree of organ dysfunction does not meet CMS -criteria for severe sepsis  Reassessment  Hemodynamically stable and improving on leukocytosis and no fever  Blood culture is negative  Continue monitoring without antibiotics

## 2020-06-12 NOTE — PROGRESS NOTES
PT arrived to unit via gurney escorted by RN. VSS. Pt A&O4. Monitor leads in place. Monitor room notified. PT is orientated to the unit, call light, phone system, fall precautions in place, appropriate signs in place, bed in low and locked positions. Pt educated regarded fall precautions and importance of calling staff for assistance. Pt denies further needs at the time. Will continue to monitor.

## 2020-06-12 NOTE — ASSESSMENT & PLAN NOTE
Large gastric mass invading into spleen  6/12 - EGD  6/13 - Sleeve gastrectomy, splenectomy, Resection of L hemidiaphragm (partial)  On zosyn  CLD

## 2020-06-12 NOTE — PROGRESS NOTES
Assumed care of patient at 0715, received bedside report from night shift RN. Bed is locked and in lowest position with call light within reach. Treaded socks in place. Patient updated on plan of care, no complaints or pain at this time. White board updated. Pt A&O4. Patient's breathing pattern is unlabored. Tele monitor in place and cardiac rhythm being monitored. All needs met at this time.

## 2020-06-12 NOTE — ANESTHESIA QCDR
2019 [facility] Clinical Data Registry (for Quality Improvement)     Postoperative nausea/vomiting risk protocol (Adult = 18 yrs and Pediatric 3-17 yrs)- (430 and 463)  General inhalation anesthetic (NOT TIVA) with PONV risk factors: Yes  Provision of anti-emetic therapy with at least 2 different classes of agents: Yes   Patient DID NOT receive anti-emetic therapy and reason is documented in Medical Record:  N/A    Multimodal Pain Management- (477)  Non-emergent surgery AND patient age >= 18: No  Use of Multimodal Pain Management, two or more drugs and/or interventions, NOT including systemic opioids:   Exception: Documented allergy to multiple classes of analgesics:     Smoking Abstinence (404)  Patient is current smoker (cigarette, pipe, e-cig, marijuanna): No  Elective Surgery:   Abstinence instructions provided prior to day of surgery:   Patient abstained from smoking on day of surgery:     Pre-Op Beta-Blocker in Isolated CABG (44)  Isolated CABG AND patient age >= 18: No  Beta-blocker admin within 24 hours of surgical incision:   Exception:of medical reason(s) for not administering beta blocker within 24 hours prior to surgical incision (e.g., not  indicated,other medical reason):     PACU assessment of acute postoperative pain prior to Anesthesia Care End- Applies to Patients Age = 18- (ABG7)  Initial PACU pain score is which of the following: < 7/10  Patient unable to report pain score: N/A    Post-anesthetic transfer of care checklist/protocol to PACU/ICU- (426 and 427)  Upon conclusion of case, patient transferred to which of the following locations: PACU/Non-ICU  Use of transfer checklist/protocol: Yes  Exclusion: Service Performed in Patient Hospital Room (and thus did not require transfer): N/A  Unplanned admission to ICU related to anesthesia service up through end of PACU care- (MD51)  Unplanned admission to ICU (not initially anticipated at anesthesia start time): No

## 2020-06-12 NOTE — OP REPORT
OP Note  Procedure Date: 6/12/2020     PreOp Diagnosis: UGI bleeding, weight loss, suspect gastric mass    PostOp Diagnosis:   Esophagus: Grossly normal  Stomach: A large cratered ulcer with an adherent clot was seen in the fundus/cardia area, s/p s/p cold forceps biopsy. Another cratered ulcer with likely perforation from tumor growth was see adjacent to it. It is extremely difficult to visualize the ulcers, do biopsies and check the perforation due to angulation issue. The lesion was intermittently covered by fundal gastric folds too.  Duodenum: Grossly normal  No fresh blood nor blood clot was seen in the entire exam.    Procedure(s):  GASTROSCOPY, WITH BIOPSY - Wound Class: Clean Contaminated    Surgeon(s):  Nicola Ray M.D.    Anesthesiologist/Type of Anesthesia:  Anesthesiologist: Jabier Singletary D.O./General    Surgical Staff:  Circulator: Letty Rico R.N.  Endoscopy Technician: Trinh Marr C.N.A.  Endoscopy Nurse: Tobin Rueda R.N.    Specimens removed if any:  ID Type Source Tests Collected by Time Destination   A : R/O H. Pylori & GIST Tissue Gastric PATHOLOGY SPECIMEN Nicola Ray M.D. 6/12/2020  9:39 AM        Estimated Blood Loss: minimal    Anesthesia/Medications:  see anesthesia note     COMPLICATIONS:  No immediate complications.    PROCEDURE IN DETAIL, Findings and ENDOSCOPIC DIAGNOSIS:      -Prior to the procedure, a History and Physical was performed, and patient medications and allergies were reviewed. The patient’s tolerance of previous anesthesia was also reviewed. The risks and benefits of the procedure and the sedation options and risks were discussed with the patient. All questions were answered, and informed consent was obtained. The patient was deemed in satisfactory condition to undergo the procedure.    -Prior Anticoagulants: the patient has taken no previous anticoagulant or antiplatelet agents.    -ASA Grade Assessment: see anesthesia note     -The patient was  placed in the left lateral decubitus position. The scope was passed under direct vision. Continuous oxygen was provided via nasal cannula and intravenous sedation was administered in divided doses throughout the procedure. The patient’s blood pressure, pulse, and oxygen saturation were monitored continuously throughout the procedure.    -The gastroscope was gently advanced under direct visualization over the tongue, down the esophagus, through the stomach and into the 2nd portion of the duodenum. The color, texture, mucosa and anatomy of esophagus, stomach and duodenum were carefully examined with the scope. The scope was then withdrawn from the patient and the procedure terminated. Further details are in the finding section, based on anatomical location.    -The patient tolerated the procedure well and there were no immediate complications. The patient was then transferred to the recovery room in stable condition.    Findings:  Esophagus: Grossly normal  Stomach: A large cratered ulcer with an adherent clot was seen in the fundus/cardia area, s/p s/p cold forceps biopsy. Another cratered ulcer with likely perforation from tumor growth was see adjacent to it. It is extremely difficult to visualize the ulcers, do biopsies and check the perforation due to angulation issue. The lesion was intermittently covered by fundal gastric folds too.  Duodenum: Grossly normal  No fresh blood nor blood clot was seen in the entire exam.    RECOMMENDATIONS:    1. A letter will be sent regarding to the biopsy result in about 2 weeks.  2. I called Dr. Moura to explain the findings. Most likely the patient will need a tumor resection, mainly because of perforation and free air in the abdomen. I spent a lengthy time explaining to the patient in the post-op.He verbalized understanding.     6/12/2020 10:10 AM Nicola Ray M.D.

## 2020-06-12 NOTE — ASSESSMENT & PLAN NOTE
-Improving  -Likely due to dehydration  -Start IV fluids  -Repeat BMP in the morning - continue to monitor

## 2020-06-12 NOTE — ASSESSMENT & PLAN NOTE
Came with upper GI bleeding and hemoglobin dropped with dark stool  EGD: Ulcer with adherent clot as noted above.  Underwent surgery  Stable  Continue oral PPI  Labs daily

## 2020-06-13 ENCOUNTER — APPOINTMENT (OUTPATIENT)
Dept: RADIOLOGY | Facility: MEDICAL CENTER | Age: 65
DRG: 853 | End: 2020-06-13
Attending: SURGERY
Payer: COMMERCIAL

## 2020-06-13 ENCOUNTER — ANESTHESIA (OUTPATIENT)
Dept: SURGERY | Facility: MEDICAL CENTER | Age: 65
DRG: 853 | End: 2020-06-13
Payer: COMMERCIAL

## 2020-06-13 ENCOUNTER — ANESTHESIA EVENT (OUTPATIENT)
Dept: SURGERY | Facility: MEDICAL CENTER | Age: 65
DRG: 853 | End: 2020-06-13
Payer: COMMERCIAL

## 2020-06-13 PROBLEM — Z90.81 S/P SPLENECTOMY: Status: ACTIVE | Noted: 2020-06-13

## 2020-06-13 LAB
ALBUMIN SERPL BCP-MCNC: 2.3 G/DL (ref 3.2–4.9)
ALBUMIN/GLOB SERPL: 1 G/DL
ALP SERPL-CCNC: 55 U/L (ref 30–99)
ALT SERPL-CCNC: 15 U/L (ref 2–50)
ANION GAP SERPL CALC-SCNC: 10 MMOL/L (ref 7–16)
ANION GAP SERPL CALC-SCNC: 8 MMOL/L (ref 7–16)
AST SERPL-CCNC: 18 U/L (ref 12–45)
BASOPHILS # BLD AUTO: 0.2 % (ref 0–1.8)
BASOPHILS # BLD AUTO: 0.3 % (ref 0–1.8)
BASOPHILS # BLD: 0.02 K/UL (ref 0–0.12)
BASOPHILS # BLD: 0.04 K/UL (ref 0–0.12)
BILIRUB SERPL-MCNC: 0.2 MG/DL (ref 0.1–1.5)
BUN SERPL-MCNC: 11 MG/DL (ref 8–22)
BUN SERPL-MCNC: 14 MG/DL (ref 8–22)
CALCIUM SERPL-MCNC: 7.3 MG/DL (ref 8.5–10.5)
CALCIUM SERPL-MCNC: 8.5 MG/DL (ref 8.5–10.5)
CHLORIDE SERPL-SCNC: 101 MMOL/L (ref 96–112)
CHLORIDE SERPL-SCNC: 102 MMOL/L (ref 96–112)
CO2 SERPL-SCNC: 23 MMOL/L (ref 20–33)
CO2 SERPL-SCNC: 23 MMOL/L (ref 20–33)
CREAT SERPL-MCNC: 0.76 MG/DL (ref 0.5–1.4)
CREAT SERPL-MCNC: 0.76 MG/DL (ref 0.5–1.4)
EKG IMPRESSION: NORMAL
EOSINOPHIL # BLD AUTO: 0 K/UL (ref 0–0.51)
EOSINOPHIL # BLD AUTO: 0.01 K/UL (ref 0–0.51)
EOSINOPHIL NFR BLD: 0 % (ref 0–6.9)
EOSINOPHIL NFR BLD: 0.1 % (ref 0–6.9)
ERYTHROCYTE [DISTWIDTH] IN BLOOD BY AUTOMATED COUNT: 46.5 FL (ref 35.9–50)
ERYTHROCYTE [DISTWIDTH] IN BLOOD BY AUTOMATED COUNT: 48.6 FL (ref 35.9–50)
GLOBULIN SER CALC-MCNC: 2.4 G/DL (ref 1.9–3.5)
GLUCOSE SERPL-MCNC: 110 MG/DL (ref 65–99)
GLUCOSE SERPL-MCNC: 197 MG/DL (ref 65–99)
HCT VFR BLD AUTO: 22.6 % (ref 42–52)
HCT VFR BLD AUTO: 31.9 % (ref 42–52)
HGB BLD-MCNC: 10.4 G/DL (ref 14–18)
HGB BLD-MCNC: 6.9 G/DL (ref 14–18)
IMM GRANULOCYTES # BLD AUTO: 0.15 K/UL (ref 0–0.11)
IMM GRANULOCYTES # BLD AUTO: 0.35 K/UL (ref 0–0.11)
IMM GRANULOCYTES NFR BLD AUTO: 1.4 % (ref 0–0.9)
IMM GRANULOCYTES NFR BLD AUTO: 2.2 % (ref 0–0.9)
LYMPHOCYTES # BLD AUTO: 1.49 K/UL (ref 1–4.8)
LYMPHOCYTES # BLD AUTO: 1.65 K/UL (ref 1–4.8)
LYMPHOCYTES NFR BLD: 10.5 % (ref 22–41)
LYMPHOCYTES NFR BLD: 13.7 % (ref 22–41)
MAGNESIUM SERPL-MCNC: 1.9 MG/DL (ref 1.5–2.5)
MCH RBC QN AUTO: 27.8 PG (ref 27–33)
MCH RBC QN AUTO: 29.1 PG (ref 27–33)
MCHC RBC AUTO-ENTMCNC: 30.5 G/DL (ref 33.7–35.3)
MCHC RBC AUTO-ENTMCNC: 32.6 G/DL (ref 33.7–35.3)
MCV RBC AUTO: 89.1 FL (ref 81.4–97.8)
MCV RBC AUTO: 91.1 FL (ref 81.4–97.8)
MONOCYTES # BLD AUTO: 0.59 K/UL (ref 0–0.85)
MONOCYTES # BLD AUTO: 0.89 K/UL (ref 0–0.85)
MONOCYTES NFR BLD AUTO: 3.8 % (ref 0–13.4)
MONOCYTES NFR BLD AUTO: 8.2 % (ref 0–13.4)
NEUTROPHILS # BLD AUTO: 13.02 K/UL (ref 1.82–7.42)
NEUTROPHILS # BLD AUTO: 8.34 K/UL (ref 1.82–7.42)
NEUTROPHILS NFR BLD: 76.5 % (ref 44–72)
NEUTROPHILS NFR BLD: 83.1 % (ref 44–72)
NRBC # BLD AUTO: 0 K/UL
NRBC # BLD AUTO: 0 K/UL
NRBC BLD-RTO: 0 /100 WBC
NRBC BLD-RTO: 0 /100 WBC
PHOSPHATE SERPL-MCNC: 3.1 MG/DL (ref 2.5–4.5)
PLATELET # BLD AUTO: 461 K/UL (ref 164–446)
PLATELET # BLD AUTO: 574 K/UL (ref 164–446)
PMV BLD AUTO: 8.4 FL (ref 9–12.9)
PMV BLD AUTO: 8.5 FL (ref 9–12.9)
POTASSIUM SERPL-SCNC: 3.7 MMOL/L (ref 3.6–5.5)
POTASSIUM SERPL-SCNC: 4 MMOL/L (ref 3.6–5.5)
PROT SERPL-MCNC: 4.7 G/DL (ref 6–8.2)
RBC # BLD AUTO: 2.48 M/UL (ref 4.7–6.1)
RBC # BLD AUTO: 3.58 M/UL (ref 4.7–6.1)
SODIUM SERPL-SCNC: 132 MMOL/L (ref 135–145)
SODIUM SERPL-SCNC: 135 MMOL/L (ref 135–145)
WBC # BLD AUTO: 10.9 K/UL (ref 4.8–10.8)
WBC # BLD AUTO: 15.7 K/UL (ref 4.8–10.8)

## 2020-06-13 PROCEDURE — P9016 RBC LEUKOCYTES REDUCED: HCPCS | Mod: 91

## 2020-06-13 PROCEDURE — 88360 TUMOR IMMUNOHISTOCHEM/MANUAL: CPT

## 2020-06-13 PROCEDURE — 501445 HCHG STAPLER, SKIN DISP: Performed by: SURGERY

## 2020-06-13 PROCEDURE — 07TP0ZZ RESECTION OF SPLEEN, OPEN APPROACH: ICD-10-PCS | Performed by: SURGERY

## 2020-06-13 PROCEDURE — 302129 PCA PLUS: Performed by: SURGERY

## 2020-06-13 PROCEDURE — 501589 HCHG TUBE, CHEST 28FR: Performed by: SURGERY

## 2020-06-13 PROCEDURE — 700101 HCHG RX REV CODE 250: Performed by: INTERNAL MEDICINE

## 2020-06-13 PROCEDURE — 0DB60Z3 EXCISION OF STOMACH, OPEN APPROACH, VERTICAL: ICD-10-PCS | Performed by: SURGERY

## 2020-06-13 PROCEDURE — 64520 N BLOCK LUMBAR/THORACIC: CPT | Performed by: SURGERY

## 2020-06-13 PROCEDURE — 83735 ASSAY OF MAGNESIUM: CPT

## 2020-06-13 PROCEDURE — 700105 HCHG RX REV CODE 258: Performed by: INTERNAL MEDICINE

## 2020-06-13 PROCEDURE — 88341 IMHCHEM/IMCYTCHM EA ADD ANTB: CPT | Mod: 91

## 2020-06-13 PROCEDURE — 85025 COMPLETE CBC W/AUTO DIFF WBC: CPT

## 2020-06-13 PROCEDURE — 160048 HCHG OR STATISTICAL LEVEL 1-5: Performed by: SURGERY

## 2020-06-13 PROCEDURE — 84100 ASSAY OF PHOSPHORUS: CPT

## 2020-06-13 PROCEDURE — 160002 HCHG RECOVERY MINUTES (STAT): Performed by: SURGERY

## 2020-06-13 PROCEDURE — 160035 HCHG PACU - 1ST 60 MINS PHASE I: Performed by: SURGERY

## 2020-06-13 PROCEDURE — 0W3B4ZZ CONTROL BLEEDING IN LEFT PLEURAL CAVITY, PERCUTANEOUS ENDOSCOPIC APPROACH: ICD-10-PCS | Performed by: SURGERY

## 2020-06-13 PROCEDURE — 88342 IMHCHEM/IMCYTCHM 1ST ANTB: CPT

## 2020-06-13 PROCEDURE — 80048 BASIC METABOLIC PNL TOTAL CA: CPT

## 2020-06-13 PROCEDURE — 700111 HCHG RX REV CODE 636 W/ 250 OVERRIDE (IP): Performed by: INTERNAL MEDICINE

## 2020-06-13 PROCEDURE — 770022 HCHG ROOM/CARE - ICU (200)

## 2020-06-13 PROCEDURE — 99233 SBSQ HOSP IP/OBS HIGH 50: CPT | Performed by: SURGERY

## 2020-06-13 PROCEDURE — 88305 TISSUE EXAM BY PATHOLOGIST: CPT

## 2020-06-13 PROCEDURE — 700105 HCHG RX REV CODE 258: Performed by: ANESTHESIOLOGY

## 2020-06-13 PROCEDURE — 500378 HCHG DRAIN, J-VAC ROUND 19FR: Performed by: SURGERY

## 2020-06-13 PROCEDURE — 160041 HCHG SURGERY MINUTES - EA ADDL 1 MIN LEVEL 4: Performed by: SURGERY

## 2020-06-13 PROCEDURE — 700111 HCHG RX REV CODE 636 W/ 250 OVERRIDE (IP): Performed by: ANESTHESIOLOGY

## 2020-06-13 PROCEDURE — 0BBT0ZZ EXCISION OF DIAPHRAGM, OPEN APPROACH: ICD-10-PCS | Performed by: SURGERY

## 2020-06-13 PROCEDURE — 160036 HCHG PACU - EA ADDL 30 MINS PHASE I: Performed by: SURGERY

## 2020-06-13 PROCEDURE — 500389 HCHG DRAIN, RESERVOIR SUCT JP 100CC: Performed by: SURGERY

## 2020-06-13 PROCEDURE — C9113 INJ PANTOPRAZOLE SODIUM, VIA: HCPCS | Performed by: INTERNAL MEDICINE

## 2020-06-13 PROCEDURE — 64486 TAP BLOCK UNIL BY INJECTION: CPT | Performed by: SURGERY

## 2020-06-13 PROCEDURE — 93005 ELECTROCARDIOGRAM TRACING: CPT | Performed by: SURGERY

## 2020-06-13 PROCEDURE — 160009 HCHG ANES TIME/MIN: Performed by: SURGERY

## 2020-06-13 PROCEDURE — A4314 CATH W/DRAINAGE 2-WAY LATEX: HCPCS | Performed by: SURGERY

## 2020-06-13 PROCEDURE — 88309 TISSUE EXAM BY PATHOLOGIST: CPT

## 2020-06-13 PROCEDURE — 36430 TRANSFUSION BLD/BLD COMPNT: CPT

## 2020-06-13 PROCEDURE — A9270 NON-COVERED ITEM OR SERVICE: HCPCS | Performed by: INTERNAL MEDICINE

## 2020-06-13 PROCEDURE — C1765 ADHESION BARRIER: HCPCS | Performed by: SURGERY

## 2020-06-13 PROCEDURE — 700105 HCHG RX REV CODE 258

## 2020-06-13 PROCEDURE — 93010 ELECTROCARDIOGRAM REPORT: CPT | Performed by: INTERNAL MEDICINE

## 2020-06-13 PROCEDURE — 36415 COLL VENOUS BLD VENIPUNCTURE: CPT

## 2020-06-13 PROCEDURE — 700102 HCHG RX REV CODE 250 W/ 637 OVERRIDE(OP): Performed by: INTERNAL MEDICINE

## 2020-06-13 PROCEDURE — 86923 COMPATIBILITY TEST ELECTRIC: CPT

## 2020-06-13 PROCEDURE — 501838 HCHG SUTURE GENERAL: Performed by: SURGERY

## 2020-06-13 PROCEDURE — 99233 SBSQ HOSP IP/OBS HIGH 50: CPT | Performed by: INTERNAL MEDICINE

## 2020-06-13 PROCEDURE — 700101 HCHG RX REV CODE 250: Performed by: SURGERY

## 2020-06-13 PROCEDURE — 700101 HCHG RX REV CODE 250: Performed by: ANESTHESIOLOGY

## 2020-06-13 PROCEDURE — 80053 COMPREHEN METABOLIC PANEL: CPT

## 2020-06-13 PROCEDURE — 502240 HCHG MISC OR SUPPLY RC 0272: Performed by: SURGERY

## 2020-06-13 PROCEDURE — 71045 X-RAY EXAM CHEST 1 VIEW: CPT

## 2020-06-13 PROCEDURE — 700111 HCHG RX REV CODE 636 W/ 250 OVERRIDE (IP): Performed by: SURGERY

## 2020-06-13 PROCEDURE — 160029 HCHG SURGERY MINUTES - 1ST 30 MINS LEVEL 4: Performed by: SURGERY

## 2020-06-13 RX ORDER — SODIUM CHLORIDE, SODIUM LACTATE, POTASSIUM CHLORIDE, CALCIUM CHLORIDE 600; 310; 30; 20 MG/100ML; MG/100ML; MG/100ML; MG/100ML
INJECTION, SOLUTION INTRAVENOUS CONTINUOUS
Status: DISCONTINUED | OUTPATIENT
Start: 2020-06-13 | End: 2020-06-13 | Stop reason: HOSPADM

## 2020-06-13 RX ORDER — MAGNESIUM HYDROXIDE 1200 MG/15ML
LIQUID ORAL
Status: COMPLETED | OUTPATIENT
Start: 2020-06-13 | End: 2020-06-13

## 2020-06-13 RX ORDER — ONDANSETRON 2 MG/ML
INJECTION INTRAMUSCULAR; INTRAVENOUS PRN
Status: DISCONTINUED | OUTPATIENT
Start: 2020-06-13 | End: 2020-06-13 | Stop reason: SURG

## 2020-06-13 RX ORDER — BUPIVACAINE HYDROCHLORIDE AND EPINEPHRINE 2.5; 5 MG/ML; UG/ML
INJECTION, SOLUTION EPIDURAL; INFILTRATION; INTRACAUDAL; PERINEURAL PRN
Status: DISCONTINUED | OUTPATIENT
Start: 2020-06-13 | End: 2020-06-13 | Stop reason: SURG

## 2020-06-13 RX ORDER — BUPIVACAINE HYDROCHLORIDE AND EPINEPHRINE 5; 5 MG/ML; UG/ML
INJECTION, SOLUTION EPIDURAL; INTRACAUDAL; PERINEURAL PRN
Status: DISCONTINUED | OUTPATIENT
Start: 2020-06-13 | End: 2020-06-13 | Stop reason: SURG

## 2020-06-13 RX ORDER — HYDROMORPHONE HYDROCHLORIDE 2 MG/ML
INJECTION, SOLUTION INTRAMUSCULAR; INTRAVENOUS; SUBCUTANEOUS PRN
Status: DISCONTINUED | OUTPATIENT
Start: 2020-06-13 | End: 2020-06-13 | Stop reason: SURG

## 2020-06-13 RX ORDER — MIDAZOLAM HYDROCHLORIDE 1 MG/ML
INJECTION INTRAMUSCULAR; INTRAVENOUS PRN
Status: DISCONTINUED | OUTPATIENT
Start: 2020-06-13 | End: 2020-06-13 | Stop reason: SURG

## 2020-06-13 RX ORDER — MAGNESIUM SULFATE HEPTAHYDRATE 40 MG/ML
INJECTION, SOLUTION INTRAVENOUS PRN
Status: DISCONTINUED | OUTPATIENT
Start: 2020-06-13 | End: 2020-06-13 | Stop reason: SURG

## 2020-06-13 RX ORDER — MORPHINE SULFATE 10 MG/ML
4 INJECTION, SOLUTION INTRAMUSCULAR; INTRAVENOUS
Status: DISCONTINUED | OUTPATIENT
Start: 2020-06-13 | End: 2020-06-13

## 2020-06-13 RX ORDER — HALOPERIDOL 5 MG/ML
1 INJECTION INTRAMUSCULAR
Status: DISCONTINUED | OUTPATIENT
Start: 2020-06-13 | End: 2020-06-13 | Stop reason: HOSPADM

## 2020-06-13 RX ORDER — MIDAZOLAM HYDROCHLORIDE 1 MG/ML
1 INJECTION INTRAMUSCULAR; INTRAVENOUS
Status: DISCONTINUED | OUTPATIENT
Start: 2020-06-13 | End: 2020-06-13 | Stop reason: HOSPADM

## 2020-06-13 RX ORDER — SODIUM CHLORIDE, SODIUM GLUCONATE, SODIUM ACETATE, POTASSIUM CHLORIDE AND MAGNESIUM CHLORIDE 526; 502; 368; 37; 30 MG/100ML; MG/100ML; MG/100ML; MG/100ML; MG/100ML
INJECTION, SOLUTION INTRAVENOUS
Status: DISCONTINUED | OUTPATIENT
Start: 2020-06-13 | End: 2020-06-13 | Stop reason: SURG

## 2020-06-13 RX ORDER — HYDROMORPHONE HYDROCHLORIDE 1 MG/ML
0.4 INJECTION, SOLUTION INTRAMUSCULAR; INTRAVENOUS; SUBCUTANEOUS
Status: DISCONTINUED | OUTPATIENT
Start: 2020-06-13 | End: 2020-06-13 | Stop reason: HOSPADM

## 2020-06-13 RX ORDER — HYDROMORPHONE HYDROCHLORIDE 1 MG/ML
0.2 INJECTION, SOLUTION INTRAMUSCULAR; INTRAVENOUS; SUBCUTANEOUS
Status: DISCONTINUED | OUTPATIENT
Start: 2020-06-13 | End: 2020-06-13 | Stop reason: HOSPADM

## 2020-06-13 RX ORDER — HYDROMORPHONE HYDROCHLORIDE 1 MG/ML
0.1 INJECTION, SOLUTION INTRAMUSCULAR; INTRAVENOUS; SUBCUTANEOUS
Status: DISCONTINUED | OUTPATIENT
Start: 2020-06-13 | End: 2020-06-13 | Stop reason: HOSPADM

## 2020-06-13 RX ORDER — ROCURONIUM BROMIDE 10 MG/ML
INJECTION, SOLUTION INTRAVENOUS PRN
Status: DISCONTINUED | OUTPATIENT
Start: 2020-06-13 | End: 2020-06-13 | Stop reason: SURG

## 2020-06-13 RX ORDER — DEXAMETHASONE SODIUM PHOSPHATE 4 MG/ML
INJECTION, SOLUTION INTRA-ARTICULAR; INTRALESIONAL; INTRAMUSCULAR; INTRAVENOUS; SOFT TISSUE PRN
Status: DISCONTINUED | OUTPATIENT
Start: 2020-06-13 | End: 2020-06-13 | Stop reason: SURG

## 2020-06-13 RX ORDER — CEFAZOLIN SODIUM 1 G/3ML
INJECTION, POWDER, FOR SOLUTION INTRAMUSCULAR; INTRAVENOUS PRN
Status: DISCONTINUED | OUTPATIENT
Start: 2020-06-13 | End: 2020-06-13 | Stop reason: SURG

## 2020-06-13 RX ORDER — SODIUM CHLORIDE 9 MG/ML
INJECTION, SOLUTION INTRAVENOUS
Status: COMPLETED
Start: 2020-06-13 | End: 2020-06-13

## 2020-06-13 RX ORDER — SODIUM CHLORIDE 9 MG/ML
INJECTION, SOLUTION INTRAVENOUS CONTINUOUS
Status: ACTIVE | OUTPATIENT
Start: 2020-06-13 | End: 2020-06-14

## 2020-06-13 RX ORDER — LIDOCAINE HYDROCHLORIDE 20 MG/ML
INJECTION, SOLUTION EPIDURAL; INFILTRATION; INTRACAUDAL; PERINEURAL PRN
Status: DISCONTINUED | OUTPATIENT
Start: 2020-06-13 | End: 2020-06-13 | Stop reason: SURG

## 2020-06-13 RX ADMIN — METRONIDAZOLE 500 MG: 500 INJECTION, SOLUTION INTRAVENOUS at 06:02

## 2020-06-13 RX ADMIN — SODIUM CHLORIDE, POTASSIUM CHLORIDE, SODIUM LACTATE AND CALCIUM CHLORIDE: 600; 310; 30; 20 INJECTION, SOLUTION INTRAVENOUS at 07:54

## 2020-06-13 RX ADMIN — PROPOFOL 120 MG: 10 INJECTION, EMULSION INTRAVENOUS at 12:12

## 2020-06-13 RX ADMIN — CEFTRIAXONE SODIUM 2 G: 2 INJECTION, POWDER, FOR SOLUTION INTRAMUSCULAR; INTRAVENOUS at 20:04

## 2020-06-13 RX ADMIN — CYANOCOBALAMIN TAB 500 MCG 1000 MCG: 500 TAB at 06:02

## 2020-06-13 RX ADMIN — BUPIVACAINE HYDROCHLORIDE AND EPINEPHRINE 30 ML: 2.5; 5 INJECTION, SOLUTION EPIDURAL; INFILTRATION; INTRACAUDAL; PERINEURAL at 14:08

## 2020-06-13 RX ADMIN — ROCURONIUM BROMIDE 10 MG: 10 INJECTION, SOLUTION INTRAVENOUS at 13:43

## 2020-06-13 RX ADMIN — MIDAZOLAM HYDROCHLORIDE 1 MG: 1 INJECTION, SOLUTION INTRAMUSCULAR; INTRAVENOUS at 14:32

## 2020-06-13 RX ADMIN — METRONIDAZOLE 500 MG: 500 INJECTION, SOLUTION INTRAVENOUS at 21:36

## 2020-06-13 RX ADMIN — CEFAZOLIN 2 G: 330 INJECTION, POWDER, FOR SOLUTION INTRAMUSCULAR; INTRAVENOUS at 12:12

## 2020-06-13 RX ADMIN — SUGAMMADEX 200 MG: 100 INJECTION, SOLUTION INTRAVENOUS at 14:11

## 2020-06-13 RX ADMIN — SODIUM CHLORIDE, POTASSIUM CHLORIDE, SODIUM LACTATE AND CALCIUM CHLORIDE: 600; 310; 30; 20 INJECTION, SOLUTION INTRAVENOUS at 01:24

## 2020-06-13 RX ADMIN — ROCURONIUM BROMIDE 50 MG: 10 INJECTION, SOLUTION INTRAVENOUS at 12:12

## 2020-06-13 RX ADMIN — HYDROMORPHONE HYDROCHLORIDE 1 MG: 2 INJECTION, SOLUTION INTRAMUSCULAR; INTRAVENOUS; SUBCUTANEOUS at 12:32

## 2020-06-13 RX ADMIN — MIDAZOLAM HYDROCHLORIDE 1 MG: 1 INJECTION, SOLUTION INTRAMUSCULAR; INTRAVENOUS at 15:28

## 2020-06-13 RX ADMIN — FENTANYL CITRATE 50 MCG: 50 INJECTION INTRAMUSCULAR; INTRAVENOUS at 15:27

## 2020-06-13 RX ADMIN — MORPHINE SULFATE 4 MG: 10 INJECTION INTRAVENOUS at 21:30

## 2020-06-13 RX ADMIN — MORPHINE SULFATE 4 MG: 10 INJECTION INTRAVENOUS at 18:30

## 2020-06-13 RX ADMIN — SODIUM CHLORIDE, SODIUM GLUCONATE, SODIUM ACETATE, POTASSIUM CHLORIDE AND MAGNESIUM CHLORIDE: 526; 502; 368; 37; 30 INJECTION, SOLUTION INTRAVENOUS at 12:23

## 2020-06-13 RX ADMIN — MORPHINE SULFATE 4 MG: 10 INJECTION INTRAVENOUS at 19:33

## 2020-06-13 RX ADMIN — ONDANSETRON 4 MG: 2 INJECTION INTRAMUSCULAR; INTRAVENOUS at 12:12

## 2020-06-13 RX ADMIN — DEXAMETHASONE SODIUM PHOSPHATE 4 MG: 4 INJECTION, SOLUTION INTRA-ARTICULAR; INTRALESIONAL; INTRAMUSCULAR; INTRAVENOUS; SOFT TISSUE at 12:12

## 2020-06-13 RX ADMIN — MIDAZOLAM HYDROCHLORIDE 2 MG: 1 INJECTION, SOLUTION INTRAMUSCULAR; INTRAVENOUS at 12:12

## 2020-06-13 RX ADMIN — HYDROMORPHONE HYDROCHLORIDE 1 MG: 2 INJECTION, SOLUTION INTRAMUSCULAR; INTRAVENOUS; SUBCUTANEOUS at 12:27

## 2020-06-13 RX ADMIN — FENTANYL CITRATE 50 MCG: 50 INJECTION INTRAMUSCULAR; INTRAVENOUS at 14:31

## 2020-06-13 RX ADMIN — SODIUM CHLORIDE 8 MG/HR: 9 INJECTION, SOLUTION INTRAVENOUS at 21:30

## 2020-06-13 RX ADMIN — LIDOCAINE HYDROCHLORIDE 3 ML: 20 INJECTION, SOLUTION EPIDURAL; INFILTRATION; INTRACAUDAL at 12:12

## 2020-06-13 RX ADMIN — BUPIVACAINE HYDROCHLORIDE AND EPINEPHRINE BITARTRATE 30 ML: 5; .005 INJECTION, SOLUTION EPIDURAL; INTRACAUDAL; PERINEURAL at 12:21

## 2020-06-13 RX ADMIN — DEXAMETHASONE SODIUM PHOSPHATE 4 MG: 4 INJECTION, SOLUTION INTRA-ARTICULAR; INTRALESIONAL; INTRAMUSCULAR; INTRAVENOUS; SOFT TISSUE at 12:21

## 2020-06-13 RX ADMIN — SODIUM CHLORIDE: 9 INJECTION, SOLUTION INTRAVENOUS at 04:45

## 2020-06-13 RX ADMIN — EPHEDRINE SULFATE 10 MG: 50 INJECTION, SOLUTION INTRAVENOUS at 12:36

## 2020-06-13 RX ADMIN — MAGNESIUM SULFATE IN WATER 4 G: 40 INJECTION, SOLUTION INTRAVENOUS at 12:30

## 2020-06-13 ASSESSMENT — ENCOUNTER SYMPTOMS
DIARRHEA: 1
DEPRESSION: 0
NAUSEA: 0
ABDOMINAL PAIN: 0
SHORTNESS OF BREATH: 1
HEADACHES: 0
SORE THROAT: 0
SHORTNESS OF BREATH: 0
CHILLS: 0
BLURRED VISION: 0
NERVOUS/ANXIOUS: 0
FEVER: 0
FALLS: 0
PALPITATIONS: 0
VOMITING: 0
DIZZINESS: 0
WEAKNESS: 0
COUGH: 0
ABDOMINAL PAIN: 1
CONSTIPATION: 0

## 2020-06-13 ASSESSMENT — PAIN SCALES - GENERAL: PAIN_LEVEL: 3

## 2020-06-13 NOTE — CARE PLAN
Problem: Safety  Goal: Will remain free from injury  Outcome: PROGRESSING AS EXPECTED     Problem: Infection  Goal: Will remain free from infection  Outcome: PROGRESSING AS EXPECTED     Problem: Venous Thromboembolism (VTW)/Deep Vein Thrombosis (DVT) Prevention:  Goal: Patient will participate in Venous Thrombosis (VTE)/Deep Vein Thrombosis (DVT)Prevention Measures  Outcome: PROGRESSING AS EXPECTED     Problem: Knowledge Deficit  Goal: Knowledge of the prescribed therapeutic regimen will improve  Outcome: PROGRESSING AS EXPECTED

## 2020-06-13 NOTE — ANESTHESIA PROCEDURE NOTES
Airway    Date/Time: 6/13/2020 12:14 PM  Performed by: Austyn Pina M.D.  Authorized by: Austyn Pina M.D.     Location:  OR  Urgency:  Elective  Indications for Airway Management:  Anesthesia      Spontaneous Ventilation: absent    Sedation Level:  Deep  Preoxygenated: Yes    Patient Position:  Sniffing  Mask Difficulty Assessment:  0 - not attempted  Final Airway Type:  Endotracheal airway  Final Endotracheal Airway:  ETT  Cuffed: Yes    Technique Used for Successful ETT Placement:  Video laryngoscopy    Insertion Site:  Oral  Blade Type:  Glide  Laryngoscope Blade/Videolaryngoscope Blade Size:  4  ETT Size (mm):  8.0  Measured from:  Teeth  ETT to Teeth (cm):  23  Placement Verified by: capnometry    Cormack-Lehane Classification:  Grade I - full view of glottis  Number of Attempts at Approach:  1

## 2020-06-13 NOTE — ANESTHESIA PROCEDURE NOTES
Peripheral Block    Date/Time: 6/13/2020 2:07 PM  Performed by: Austyn Pina M.D.  Authorized by: Austyn Pina M.D.     Patient Location:  OR  Start Time:  6/13/2020 2:07 PM  End Time:  6/13/2020 2:09 PM  Reason for Block: at surgeon's request and post-op pain management    patient identified, IV checked, site marked, risks and benefits discussed, surgical consent, monitors and equipment checked, pre-op evaluation and timeout performed    Patient Position:  Supine  Prep: ChloraPrep    Monitoring:  Heart rate, continuous pulse ox and cardiac monitor  Block Region:  Trunk  Trunk - Block Type:  Multiple level INTERCOSTAL block    Laterality:  Left  Procedures: ultrasound guided  Image captured, interpreted and electronically stored.  Strength:  1 %  Dose:  3 ml  Block Type:  Single-shot  Needle Length:  25mm  Needle Gauge:  22 G  Needle Localization:  Ultrasound guidance and anatomical landmarks  Injection Assessment:  Negative aspiration for heme, incremental injection and local visualized surrounding nerve on ultrasound   5 levels centered at T4

## 2020-06-13 NOTE — PROGRESS NOTES
Trauma / Surgical Daily Progress Note    Date of Service  6/13/2020    Chief Complaint  65 y.o. male admitted 6/11/2020. Work up revealed a perforated gastric cancer. Underwent resection with splenectomy.    Interval Events  Hospital day #3  Surgery earlier today  Pt currently requires ICU care and hospital admission  Seen on rounds and discussed with multidisciplinary team  Physiologic derangements preclude floor transfer  Events and interventions include:  Integration of multiple data points and associated complex medical decisions   Ongoing post operative resuscitation  Aggressive pulmonary toilet-at risk for decompensaton  Pain control    Review of Systems  Review of Systems   Respiratory: Positive for shortness of breath.    Gastrointestinal: Positive for abdominal pain.   All other systems reviewed and are negative.       Vital Signs for last 24 hours  Temp:  [35.9 °C (96.7 °F)-37 °C (98.6 °F)] 37 °C (98.6 °F)  Pulse:  [50-81] 73  Resp:  [12-22] 13  BP: ()/(46-71) 119/65  SpO2:  [90 %-99 %] 95 %    Hemodynamic parameters for last 24 hours       Respiratory Data     Respiration: 13, Pulse Oximetry: 95 %     Work Of Breathing / Effort: Moderate  RUL Breath Sounds: Clear, RML Breath Sounds: Clear, RLL Breath Sounds: Diminished, RAMO Breath Sounds: Clear, LLL Breath Sounds: Clear    Physical Exam  Physical Exam  Constitutional:       Appearance: Normal appearance.   HENT:      Head: Normocephalic and atraumatic.      Right Ear: External ear normal.      Left Ear: External ear normal.      Nose: Nose normal.   Eyes:      Extraocular Movements: Extraocular movements intact.      Pupils: Pupils are equal, round, and reactive to light.   Neck:      Musculoskeletal: Normal range of motion and neck supple.   Cardiovascular:      Rate and Rhythm: Normal rate and regular rhythm.      Pulses: Normal pulses.      Heart sounds: Normal heart sounds.   Pulmonary:      Effort: No respiratory distress.      Comments: Left  chest tube in place  Abdominal:      General: There is distension.      Tenderness: There is abdominal tenderness.      Comments: Dressings in place   Genitourinary:     Penis: Normal.       Comments: Gomez in place  Skin:     General: Skin is warm and dry.      Capillary Refill: Capillary refill takes less than 2 seconds.   Neurological:      General: No focal deficit present.      Mental Status: He is alert.      Cranial Nerves: No cranial nerve deficit.   Psychiatric:         Mood and Affect: Mood normal.         Behavior: Behavior normal.         Laboratory  Recent Results (from the past 24 hour(s))   HGB (Hemoglobin) for 48 hours    Collection Time: 06/12/20  7:23 PM   Result Value Ref Range    Hemoglobin 7.7 (L) 14.0 - 18.0 g/dL   Urinalysis    Collection Time: 06/12/20 10:42 PM    Specimen: Urine   Result Value Ref Range    Color Yellow     Character Clear     Specific Gravity 1.025 <1.035    Ph 5.5 5.0 - 8.0    Glucose Negative Negative mg/dL    Ketones Negative Negative mg/dL    Protein Negative Negative mg/dL    Bilirubin Negative Negative    Urobilinogen, Urine 0.2 Negative    Nitrite Negative Negative    Leukocyte Esterase Negative Negative    Occult Blood Negative Negative    Micro Urine Req see below    Basic Metabolic Panel    Collection Time: 06/13/20  3:31 AM   Result Value Ref Range    Sodium 132 (L) 135 - 145 mmol/L    Potassium 4.0 3.6 - 5.5 mmol/L    Chloride 101 96 - 112 mmol/L    Co2 23 20 - 33 mmol/L    Glucose 110 (H) 65 - 99 mg/dL    Bun 14 8 - 22 mg/dL    Creatinine 0.76 0.50 - 1.40 mg/dL    Calcium 8.5 8.5 - 10.5 mg/dL    Anion Gap 8.0 7.0 - 16.0   CBC WITH DIFFERENTIAL    Collection Time: 06/13/20  3:31 AM   Result Value Ref Range    WBC 10.9 (H) 4.8 - 10.8 K/uL    RBC 2.48 (L) 4.70 - 6.10 M/uL    Hemoglobin 6.9 (L) 14.0 - 18.0 g/dL    Hematocrit 22.6 (L) 42.0 - 52.0 %    MCV 91.1 81.4 - 97.8 fL    MCH 27.8 27.0 - 33.0 pg    MCHC 30.5 (L) 33.7 - 35.3 g/dL    RDW 48.6 35.9 - 50.0 fL     Platelet Count 461 (H) 164 - 446 K/uL    MPV 8.5 (L) 9.0 - 12.9 fL    Neutrophils-Polys 76.50 (H) 44.00 - 72.00 %    Lymphocytes 13.70 (L) 22.00 - 41.00 %    Monocytes 8.20 0.00 - 13.40 %    Eosinophils 0.00 0.00 - 6.90 %    Basophils 0.20 0.00 - 1.80 %    Immature Granulocytes 1.40 (H) 0.00 - 0.90 %    Nucleated RBC 0.00 /100 WBC    Neutrophils (Absolute) 8.34 (H) 1.82 - 7.42 K/uL    Lymphs (Absolute) 1.49 1.00 - 4.80 K/uL    Monos (Absolute) 0.89 (H) 0.00 - 0.85 K/uL    Eos (Absolute) 0.00 0.00 - 0.51 K/uL    Baso (Absolute) 0.02 0.00 - 0.12 K/uL    Immature Granulocytes (abs) 0.15 (H) 0.00 - 0.11 K/uL    NRBC (Absolute) 0.00 K/uL   PHOSPHORUS    Collection Time: 20  3:31 AM   Result Value Ref Range    Phosphorus 3.1 2.5 - 4.5 mg/dL   MAGNESIUM    Collection Time: 20  3:31 AM   Result Value Ref Range    Magnesium 1.9 1.5 - 2.5 mg/dL   ESTIMATED GFR    Collection Time: 20  3:31 AM   Result Value Ref Range    GFR If African American >60 >60 mL/min/1.73 m 2    GFR If Non African American >60 >60 mL/min/1.73 m 2   EKG    Collection Time: 20  6:29 AM   Result Value Ref Range    Report       [facility]    Test Date:  2020  Pt Name:    SANTIAGO AQUINO                  Department: 171  MRN:        [id number]                      Room:       T734  Gender:     Male                         Technician: MADONNA  :        [id number]                   Requested By:HENRY RIDLEY  Order #:    [id number]                    Reading MD: Zeke Ward MD    Measurements  Intervals                                Axis  Rate:       45                           P:          20  RI:         188                          QRS:        4  QRSD:       120                          T:          3  QT:         488  QTc:        423    Interpretive Statements  SINUS BRADYCARDIA  NONSPECIFIC INTRAVENTRICULAR CONDUCTION DELAY  No previous ECG available for comparison  Electronically Signed On 2020 7:31:11 PDT by  Zeke Ward MD     CBC WITH DIFFERENTIAL    Collection Time: 06/13/20  2:50 PM   Result Value Ref Range    WBC 15.7 (H) 4.8 - 10.8 K/uL    RBC 3.58 (L) 4.70 - 6.10 M/uL    Hemoglobin 10.4 (L) 14.0 - 18.0 g/dL    Hematocrit 31.9 (L) 42.0 - 52.0 %    MCV 89.1 81.4 - 97.8 fL    MCH 29.1 27.0 - 33.0 pg    MCHC 32.6 (L) 33.7 - 35.3 g/dL    RDW 46.5 35.9 - 50.0 fL    Platelet Count 574 (H) 164 - 446 K/uL    MPV 8.4 (L) 9.0 - 12.9 fL    Neutrophils-Polys 83.10 (H) 44.00 - 72.00 %    Lymphocytes 10.50 (L) 22.00 - 41.00 %    Monocytes 3.80 0.00 - 13.40 %    Eosinophils 0.10 0.00 - 6.90 %    Basophils 0.30 0.00 - 1.80 %    Immature Granulocytes 2.20 (H) 0.00 - 0.90 %    Nucleated RBC 0.00 /100 WBC    Neutrophils (Absolute) 13.02 (H) 1.82 - 7.42 K/uL    Lymphs (Absolute) 1.65 1.00 - 4.80 K/uL    Monos (Absolute) 0.59 0.00 - 0.85 K/uL    Eos (Absolute) 0.01 0.00 - 0.51 K/uL    Baso (Absolute) 0.04 0.00 - 0.12 K/uL    Immature Granulocytes (abs) 0.35 (H) 0.00 - 0.11 K/uL    NRBC (Absolute) 0.00 K/uL   Comp Metabolic Panel    Collection Time: 06/13/20  2:50 PM   Result Value Ref Range    Sodium 135 135 - 145 mmol/L    Potassium 3.7 3.6 - 5.5 mmol/L    Chloride 102 96 - 112 mmol/L    Co2 23 20 - 33 mmol/L    Anion Gap 10.0 7.0 - 16.0    Glucose 197 (H) 65 - 99 mg/dL    Bun 11 8 - 22 mg/dL    Creatinine 0.76 0.50 - 1.40 mg/dL    Calcium 7.3 (L) 8.5 - 10.5 mg/dL    AST(SGOT) 18 12 - 45 U/L    ALT(SGPT) 15 2 - 50 U/L    Alkaline Phosphatase 55 30 - 99 U/L    Total Bilirubin 0.2 0.1 - 1.5 mg/dL    Albumin 2.3 (L) 3.2 - 4.9 g/dL    Total Protein 4.7 (L) 6.0 - 8.2 g/dL    Globulin 2.4 1.9 - 3.5 g/dL    A-G Ratio 1.0 g/dL   ESTIMATED GFR    Collection Time: 06/13/20  2:50 PM   Result Value Ref Range    GFR If African American >60 >60 mL/min/1.73 m 2    GFR If Non African American >60 >60 mL/min/1.73 m 2       Fluids    Intake/Output Summary (Last 24 hours) at 6/13/2020 1622  Last data filed at 6/13/2020 1535  Gross per 24 hour    Intake 2800 ml   Output 2090 ml   Net 710 ml       Core Measures & Quality Metrics  Medications reviewed, Radiology images reviewed and Labs reviewed  Gomez catheter: Critically Ill - Requiring Accurate Measurement of Urinary Output      DVT Prophylaxis: Contraindicated - High bleeding risk  DVT prophylaxis - mechanical: SCDs  Ulcer prophylaxis: Yes  Antibiotics: Treating active infection/contamination beyond 24 hours perioperative coverage      DAVY Score  ETOH Screening    Assessment/Plan  S/P splenectomy  Assessment & Plan  6/13 - Splenectomy due to involvement  Needs Post splenectomy vaccinations    GI bleed- (present on admission)  Assessment & Plan  Large gastric mass invading into spleen  6/12 - EGD  6/13 - Sleeve gastrectomy, splenectomy, Resection of L hemidiaphragm (partial)  NGT to suction  On zosyn        Discussed patient condition with RN, RT and general surgery.

## 2020-06-13 NOTE — ANESTHESIA QCDR
2019 [facility] Clinical Data Registry (for Quality Improvement)     Postoperative nausea/vomiting risk protocol (Adult = 18 yrs and Pediatric 3-17 yrs)- (430 and 463)  General inhalation anesthetic (NOT TIVA) with PONV risk factors: No  Provision of anti-emetic therapy with at least 2 different classes of agents: N/A  Patient DID NOT receive anti-emetic therapy and reason is documented in Medical Record: N/A    Multimodal Pain Management- (477)  Non-emergent surgery AND patient age >= 18: No  Use of Multimodal Pain Management, two or more drugs and/or interventions, NOT including systemic opioids:   Exception: Documented allergy to multiple classes of analgesics:     Smoking Abstinence (404)  Patient is current smoker (cigarette, pipe, e-cig, marijuanna): No  Elective Surgery:   Abstinence instructions provided prior to day of surgery:   Patient abstained from smoking on day of surgery:     Pre-Op Beta-Blocker in Isolated CABG (44)  Isolated CABG AND patient age >= 18: No  Beta-blocker admin within 24 hours of surgical incision:   Exception:of medical reason(s) for not administering beta blocker within 24 hours prior to surgical incision (e.g., not  indicated,other medical reason):     PACU assessment of acute postoperative pain prior to Anesthesia Care End- Applies to Patients Age = 18- (ABG7)  Initial PACU pain score is which of the following: < 7/10  Patient unable to report pain score: N/A    Post-anesthetic transfer of care checklist/protocol to PACU/ICU- (426 and 427)  Upon conclusion of case, patient transferred to which of the following locations: PACU/Non-ICU  Use of transfer checklist/protocol: Yes  Exclusion: Service Performed in Patient Hospital Room (and thus did not require transfer): N/A  Unplanned admission to ICU related to anesthesia service up through end of PACU care- (MD51)  Unplanned admission to ICU (not initially anticipated at anesthesia start time): No

## 2020-06-13 NOTE — ANESTHESIA POSTPROCEDURE EVALUATION
Patient: Napoleon Zendejas    Procedure Summary     Date:  06/13/20 Room / Location:  [facility] 08 / SURGERY [facility]    Anesthesia Start:  1208 Anesthesia Stop:  1426    Procedures:       LAPAROTOMY, EXPLORATORY (N/A Abdomen)      GASTRECTOMY-PARTIAL (Left Abdomen)      SPLENECTOMY (N/A Abdomen) Diagnosis:  (SAME)    Surgeon:  Montse Moura M.D. Responsible Provider:  Austyn Pina M.D.    Anesthesia Type:  general, peripheral nerve block ASA Status:  3          Final Anesthesia Type: general, peripheral nerve block  Last vitals  BP   Blood Pressure: 119/65, Arterial BP: 114/68    Temp   37 °C (98.6 °F)    Pulse   Pulse: 73   Resp   13    SpO2   95 %      Anesthesia Post Evaluation    Patient location during evaluation: PACU  Patient participation: complete - patient participated  Level of consciousness: awake and alert  Pain score: 3    Airway patency: patent  Anesthetic complications: no  Cardiovascular status: hemodynamically stable  Respiratory status: acceptable  Hydration status: euvolemic    PONV: none           Nurse Pain Score: 3 (NPRS)

## 2020-06-13 NOTE — OP REPORT
DATE OF SERVICE:  06/13/2020    PREOPERATIVE DIAGNOSIS:  Perforated gastric mass involving spleen.    POSTOPERATIVE DIAGNOSIS:  Perforated gastric mass involving spleen.    PROCEDURES:  Exploratory celiotomy, a sleeve gastrectomy, splenectomy and   partial resection of the left hemidiaphragm.    SURGEON:  Montse Moura MD    ASSISTANT:  REGI Matthew    ANESTHESIA:  General endotracheal.    ANESTHESIOLOGIST:  Austyn Pina MD    INDICATIONS:  The patient is a 65-year-old gentleman who presented to the   hospital with bloody stool and hematemesis.  He was worked up and found to   have a large gastric mass.  It appeared to have perforated into the spleen.    He is being brought to the operating room at this time for exploration as well   as partial gastrectomy and splenectomy.    FINDINGS:  A tumor in the fundus of the stomach that had ruptured into the   spleen.  This was densely adherent to the left hemidiaphragm.  A sleeve   gastrectomy was performed to resect the mass as well as a splenectomy and had   a partial resection of the diaphragm that was involving the tumor was also   done.  The abscess cavity into the spleen measured approximately 16-17 cm.    There were palpable lymph nodes within the ____ lesser omentum, so elected not   to biopsy this in risk of compromising the blood supply to the upper stomach   since the greater gastroepiploic vessels had to be taken.  There was no   evidence of other disease within the abdominal cavity.    PROCEDURE:  After the patient was identified and consented, he was brought to   the operating room and placed in the supine position.  Patient underwent   general endotracheal anesthetic clearance.  Patient's abdomen was prepped and   draped in sterile fashion.  An upper midline incision was carried out.  Upon   entering the abdominal cavity, the aforementioned findings were noted.  The   attention was first turned to the mobilization of the distal stomach.   Along   the greater curvature of the lesser sac was opened through the greater   omentum, and the transverse colon and splenic flexure mobilized down to allow   for access to the spleen.  The lower inferior pole of the spleen was mobilized   using the LigaSure device.  We were able to isolate the splenic vessels,   which were  and ligated with an [place] stapler.  Once that was   completed, attention was then turned to the stomach.  Starting approximately   two-thirds of the way down the greater curvature of the stomach, it was   evident that tumor was on the upper part of the fundus.  It was elected to   preserve the esophagus and the proximal stomach to do a sleeve gastrectomy.    This was also done using the [place] stapler up to the esophageal hiatus.  The   attention was then turned back to the spleen, mobilization of the spleen   along the superior pole, obviously involving the diaphragm.  Unfortunately,   the abscess cavity was present, was entered, it was then finger fractured off   of the diaphragm and then ultimately the partial gastrectomy as well as the   spleen were taken en bloc.  Once that was completed, the dissection of the   diaphragm that was involved was resected separately and sent separately.  This   was done with the LigaSure device and then oversewn with 0 Ethibond sutures   to close the diaphragmatic defect.  A 28-Malay chest tube was placed into the   left hemithorax.  Once that was completed, the greater curvature of the   stomach that had been resected was oversewn with 3-0 silks along the length of   the suture line.  Abdomen was copiously irrigated.  A 19 mm Sujit drain was   brought through a separate stab incision and placed in the left upper   quadrant.  Seprafilm was placed.  Incision was closed with #1 Vicryl for the   fascia.  Skin was closed with staples.  Dry dressing placed on the wounds.    Patient was extubated and taken to the recovery room in stable condition.   All   sponge and needle counts were correct.       ____________________________________     MD TAZ RICE / ERAN    DD:  06/13/2020 14:07:07  DT:  06/13/2020 16:42:42    D#:  [id number]  Job#:  [id number]    cc: LOIS PHILLIPS MD

## 2020-06-13 NOTE — PROGRESS NOTES
Assumed care of pt. A&O 4. Pt resting in bed with no signs of labored breathing. On RA. Tele monitor in place, cardiac rhythm being monitored. Call light within reach, bed in lowest position, upper bed rails up. Pt was updated on plan of care for the night. Will continue to monitor.

## 2020-06-13 NOTE — CARE PLAN
Problem: Safety  Goal: Will remain free from injury  Outcome: PROGRESSING AS EXPECTED  Note: Pt mobility assessed at beginning of shift. Pt is up self. Discussed need to call even though pt is steady due to active bleed. Pt states understanding.     Problem: Knowledge Deficit  Goal: Knowledge of disease process/condition, treatment plan, diagnostic tests, and medications will improve  Outcome: PROGRESSING AS EXPECTED  Note: Pt educated about disease process. Reason why medications are taken. And informed about treatment plan. Pt understands need for procedure tomorrow and is agreeable with NPO at midnight.

## 2020-06-13 NOTE — OR NURSING
Awake and alert.  C/o SOB.  Vitals stable. Daughter Felecia updated with room #.  Report to JENNIFER Griffith.

## 2020-06-13 NOTE — ANESTHESIA PROCEDURE NOTES
Arterial Line  Performed by: Austyn Pina M.D.  Authorized by: Austyn Pina M.D.     Localization: surface landmarks    Patient Location:  OR  Indication: continuous blood pressure monitoring        Catheter Size:  20 G  Seldinger Technique?: Yes    Laterality:  Right  Site:  Radial artery  Line Secured:  Tape and antimicrobial disc  Events: patient tolerated procedure well with no complications

## 2020-06-13 NOTE — ANESTHESIA PROCEDURE NOTES
Peripheral Block    Date/Time: 6/13/2020 12:19 PM  Performed by: Austyn Pina M.D.  Authorized by: Austyn Pina M.D.     Start Time:  6/13/2020 12:19 PM  End Time:  6/13/2020 12:24 PM  Reason for Block: at surgeon's request and post-op pain management    patient identified, IV checked, site marked, risks and benefits discussed, surgical consent, monitors and equipment checked, pre-op evaluation and timeout performed    Patient Position:  Supine  Prep: ChloraPrep    Monitoring:  Heart rate, continuous pulse ox and cardiac monitor  Block Region:  Trunk  Trunk - Block Type:  Rectus sheath plane block - TAP block    Laterality:  Bilateral  Procedures: ultrasound guided  Image captured, interpreted and electronically stored.  Strength:  1 %  Dose:  3 ml  Block Type:  Single-shot  Needle Length:  100mm  Needle Gauge:  21 G  Needle Localization:  Ultrasound guidance  Injection Assessment:  Negative aspiration for heme and incremental injection  Evidence of intravascular injection: No

## 2020-06-13 NOTE — ANESTHESIA PREPROCEDURE EVALUATION
Relevant Problems   Other   (+) Acute blood loss anemia   (+) GI bleed   (+) Gastric mass   (+) Hyperglycemia   (+) Hyponatremia   (+) Sepsis (HCC)   (+) Thrombocytosis (HCC)       Physical Exam    Airway   Mallampati: II  TM distance: >3 FB  Neck ROM: full       Cardiovascular - normal exam  Rhythm: regular  Rate: normal  (-) murmur     Dental - normal exam           Pulmonary - normal exam  Breath sounds clear to auscultation     Abdominal    Neurological - normal exam                 Anesthesia Plan    ASA 3 (Anemia from GI bleed)   ASA physical status 3 criteria: other (comment)    Plan - general and peripheral nerve block     Peripheral nerve block will be post-op pain control  Airway plan will be ETT  (Possible post op intubation)      Induction: intravenous      Pertinent diagnostic labs and testing reviewed    Informed Consent:    Anesthetic plan and risks discussed with patient.    Use of blood products discussed with: patient whom consented to blood products.

## 2020-06-13 NOTE — ANESTHESIA PROCEDURE NOTES
Peripheral IV  Performed by: Austyn Pina M.D.  Authorized by: Austyn Pina M.D.     Size:  14 G  Laterality:  Right  Site Prep:  Alcohol  Technique:  Direct puncture  Attempts:  1

## 2020-06-13 NOTE — ANESTHESIA TIME REPORT
Anesthesia Start and Stop Event Times     Date Time Event    6/13/2020 1154 Ready for Procedure     1208 Anesthesia Start     1426 Anesthesia Stop        Responsible Staff  06/13/20    Name Role Begin End    Austyn Pina M.D. Anesth 1208 1426        Preop Diagnosis (Free Text):  Pre-op Diagnosis     GASTRIC MASS/INVADING SPLEEN        Preop Diagnosis (Codes):    Post op Diagnosis  Gastric cancer (HCC)      Premium Reason  E. Weekend    Comments:

## 2020-06-13 NOTE — PROGRESS NOTES
[facility] Medicine Daily Progress Note    Date of Service  6/13/2020    Chief Complaint  65 y.o. male admitted 6/11/2020 with bloody stools, hematemesis    Hospital Course    Mr. Napoleon eZndejas is a 65 y.o. male who presented on 6/11/2020 with dark stools.  Morning of admission the patient presented to his primary care provider and was told that he had an abnormal CBC.  Stool sample was positive for blood.  Patient does report fatigue and dark bloody stools.  He then developed lightheadedness and an episode of hematemesis.  He does report vague abdominal pain, early satiety, and decreased appetite for weeks.  Does not drink heavy alcohol and no blood thinners.  This led to his presentation to the emergency room.  Patient was admitted with sepsis secondary to abdominal infection.  Started on ceftriaxone and metronidazole.  CT abdomen was also concerning for gastric mass.  GI was consulted Dr. Ray.  The patient was started on a pantoprazole drip.  EGD demonstrated a large cratered stomach ulcer in the fundus/cardia area status post cold forceps biopsy.  There was also another cratered ulcer with perforation from tumor growth surgery.  General surgery Dr. Moura was consulted.        Interval Problem Update  Patient was seen and examined at bedside.  I have personally reviewed vitals, labs, and imaging.    6/12.  Afebrile.  Episodes of tachycardia.  Patient has been hypotensive.  Hemoglobin this morning was 5.9.  Patient tolerated blood transfusion.  Status post EGD with findings as above.  I did speak with general surgery Dr. Moura and plan for surgery tomorrow morning.  Trend hemoglobin.  Denies fever, chills, chest pain, shortness of breath.  Abdominal pain has resolved.  6/13.  Afebrile.  Slightly hypotensive.  Globin 6.9 this morning.  Patient transfused 1 unit of packed red blood cells.  Plan for OR today.  Denies fevers, chills, chest pain, shortness of breath, abdominal pain.  Does have loose bowel movements.  Did  speak with the patient and his daughter at bedside at length about disease process and upcoming procedure.      Consultants/Specialty  Gastroenterology  General surgery    Code Status  Full    Disposition  To be determined    Review of Systems  Review of Systems   Constitutional: Negative for chills and fever.   HENT: Negative for congestion and sore throat.    Eyes: Negative for blurred vision.   Respiratory: Negative for cough and shortness of breath.    Cardiovascular: Negative for chest pain, palpitations and leg swelling.   Gastrointestinal: Positive for diarrhea and melena. Negative for abdominal pain (Resolved), constipation, nausea and vomiting.   Genitourinary: Negative for dysuria, frequency and urgency.   Musculoskeletal: Negative for falls.   Skin: Negative for rash.   Neurological: Negative for dizziness, weakness and headaches.   Psychiatric/Behavioral: Negative for depression. The patient is not nervous/anxious.    All other systems reviewed and are negative.       Physical Exam  Temp:  [36.1 °C (97 °F)-36.9 °C (98.4 °F)] 36.8 °C (98.2 °F)  Pulse:  [54-91] 54  Resp:  [12-18] 18  BP: ()/(45-70) 102/58  SpO2:  [90 %-99 %] 97 %    Physical Exam  Vitals signs and nursing note reviewed.   Constitutional:       General: He is not in acute distress.     Appearance: Normal appearance.   HENT:      Head: Normocephalic and atraumatic.      Right Ear: External ear normal.      Left Ear: External ear normal.      Nose: Nose normal.      Mouth/Throat:      Mouth: Mucous membranes are moist.      Pharynx: Oropharynx is clear.   Eyes:      Extraocular Movements: Extraocular movements intact.      Conjunctiva/sclera: Conjunctivae normal.   Neck:      Musculoskeletal: Normal range of motion and neck supple.   Cardiovascular:      Rate and Rhythm: Normal rate and regular rhythm.      Pulses: Normal pulses.      Heart sounds: Normal heart sounds. No murmur. No friction rub. No gallop.    Pulmonary:      Effort:  Pulmonary effort is normal. No respiratory distress.      Breath sounds: Normal breath sounds. No wheezing or rales.   Chest:      Chest wall: No tenderness.   Abdominal:      General: Abdomen is flat. Bowel sounds are normal. There is no distension.      Palpations: Abdomen is soft. There is no mass.      Tenderness: There is no abdominal tenderness. There is no guarding.   Musculoskeletal: Normal range of motion.   Skin:     General: Skin is warm.      Capillary Refill: Capillary refill takes less than 2 seconds.   Neurological:      General: No focal deficit present.      Mental Status: He is alert and oriented to person, place, and time. Mental status is at baseline.      Cranial Nerves: No cranial nerve deficit.      Motor: No weakness.   Psychiatric:         Mood and Affect: Mood normal.         Behavior: Behavior normal.         Fluids    Intake/Output Summary (Last 24 hours) at 6/13/2020 0735  Last data filed at 6/13/2020 0545  Gross per 24 hour   Intake 960 ml   Output 350 ml   Net 610 ml       Laboratory  Recent Labs     06/11/20  1553 06/12/20 0216  06/12/20  1127 06/12/20  1923 06/13/20  0331   WBC 24.5* 10.6  --   --   --  10.9*   RBC 2.93* 2.15*  --   --   --  2.48*   HEMOGLOBIN 8.0* 5.9*   < > 7.3* 7.7* 6.9*   HEMATOCRIT 25.0* 18.9*  --   --   --  22.6*   MCV 85.3 87.9  --   --   --  91.1   MCH 27.3 27.4  --   --   --  27.8   MCHC 32.0* 31.2*  --   --   --  30.5*   RDW 44.6 44.9  --   --   --  48.6   PLATELETCT 862* 529*  --   --   --  461*   MPV 8.7* 8.6*  --   --   --  8.5*    < > = values in this interval not displayed.     Recent Labs     06/11/20  1553 06/12/20 0216 06/13/20  0331   SODIUM 131* 134* 132*   POTASSIUM 4.8 4.0 4.0   CHLORIDE 93* 100 101   CO2 23 24 23   GLUCOSE 147* 94 110*   BUN 21 24* 14   CREATININE 0.96 0.87 0.76   CALCIUM 9.0 8.4* 8.5     Recent Labs     06/11/20  1553 06/12/20  0216   APTT 21.4*  --    INR 1.21* 1.13               Imaging  CT-ABDOMEN-PELVIS WITH   Final  Result         1.  Large heterogeneous masslike hypodensity within the spleen which contains air and nonspecific speckled areas of increased density. This is contiguous with the greater curvature of the stomach with associated gastric wall thickening and with air    likely tracking from the stomach into this splenic abnormality. Findings are most concerning for underlying malignancy with air in the splenic mass raising concern for possible superinfection. Endoscopic evaluation and biopsy should be considered.   2.  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 Additional shoddy and mildly prominent retroperitoneal lymph nodes.      These findings were discussed with JACQUELIN POWERS on 6/11/2020 6:30 PM.                  DX-CHEST-PORTABLE (1 VIEW)   Final Result      1.  There is no acute cardiopulmonary process.           Assessment/Plan  S/P splenectomy  Assessment & Plan  Plan for splenectomy and exploratory laparotomy today.    Sepsis (HCC)- (present on admission)  Assessment & Plan  -This is Sepsis Present on admission  SIRS criteria identified on my evaluation include: Tachycardia, with heart rate greater than 90 BPM and Leukocytosis, with WBC greater than 12,000  Source is intra-abdominal  Sepsis protocol initiated  Fluid resuscitation ordered per protocol  IV antibiotics as appropriate for source of sepsis  While organ dysfunction may be noted elsewhere in this problem list or in the chart, degree of organ dysfunction does not meet CMS criteria for severe sepsis  -Start ceftriaxone and metronidazole  -Await culture results  -I did personally review her CT abdomen/pelvis, did note a large mass associated with the spleen and the stomach  -Radiology were also concerned about a possible superinfection  Lactic acid has remained within normal range.  -Patient is at risk of worsening, does require close monitoring of his hemodynamics    EGD confirms likely gastric mass with perforation  as source of infection    GI bleed- (present on admission)  Assessment & Plan  -Likely considering how much his hemoglobin has recently dropped  -He does state he has had some dark stool with blood in it and then red vomitus  -Monitor for bleeding  -ERP did discuss the case with GI who will follow along, await additional recommendations  -Closely monitor for signs of hemodynamic instability    Status post EGD which showed no active bleeding.  Ulcer with adherent clot as noted above.  Continue pantoprazole for now.    Acute blood loss anemia- (present on admission)  Assessment & Plan  Likely from GI bleed complicated by gastric mass  Patient is hemodynamically stable and asymptomatic  Will continue to trend with CBC  Transfuse to maintain hemoglobin greater than 7.  Status post 1 unit of packed blood cells on 6/12/2020 and 6/13/2021  Results from last 7 days   Lab Units 06/13/20  1450 06/13/20  0331 06/12/20  1923 06/12/20  1127  06/12/20  0216 06/11/20  1553   HGB 1503 g/dL 10.4* 6.9* 7.7* 7.3*   < > 5.9* 8.0*   HCT 1504 % 31.9* 22.6*  --   --   --  18.9* 25.0*   MCV 1505 fL 89.1 91.1  --   --   --  87.9 85.3    < > = values in this interval not displayed.     Folate -Folic Acid   Date Value Ref Range Status   06/12/2020 11.0 >4.0 ng/mL Final     Vitamin B12 -True Cobalamin   Date Value Ref Range Status   06/12/2020 265 211 - 911 pg/mL Final     Ferritin   Date Value Ref Range Status   06/12/2020 546.0 (H) 22.0 - 322.0 ng/mL Final     Iron   Date Value Ref Range Status   06/12/2020 31 (L) 50 - 180 ug/dL Final     Total Iron Binding   Date Value Ref Range Status   06/12/2020 162 (L) 250 - 450 ug/dL Final     % Saturation   Date Value Ref Range Status   06/12/2020 19 15 - 55 % Final         Gastric mass- (present on admission)  Assessment & Plan  Seen on CT and on exam with EGD.  General surgery Dr. Moura is following we will plan for surgery today 6/13.    Hyperglycemia- (present on admission)  Assessment &  Plan  -Mild, no need for coverage    Hyponatremia- (present on admission)  Assessment & Plan  Improving  -Likely due to dehydration  -Start IV fluids  -Repeat BMP in the morning    Thrombocytosis (HCC)- (present on admission)  Assessment & Plan  -Likely due to anemia, possibly dehydration  -Repeat CBC in the morning    Gastrointestinal prophylaxis: Pantoprazole drip  Antibiotics: Ceftriaxone and metronidazole  Diet NPO  Code status: Full  Prognosis: Guarded  Risk: The Patient is at HIGH risk for complications and decompensation secondary to his multiple cormorbidities including symptomatic anemia from acute blood loss from GI bleed versus gastric mass.  Abdominal pain secondary to gastric mass complicated by perforation requiring IV pain medication and surgery.  Sepsis secondary to GI infection.  Acute anemia from blood loss requiring blood transfusion.    I have personally reviewed notes, labs, vitals, imaging.  I discussed the plan of care with bedside RN as well as on multidisciplinary rounds    I have performed a physical exam and reviewed and updated ROS and Plan today (6/13/2020). In review of yesterday's note (6/12/2020), there are no changes except as documented above.       VTE prophylaxis: Hold off on anticoagulation in the setting of acute GI bleed requiring blood transfusion

## 2020-06-13 NOTE — PROGRESS NOTES
Gastroenterology Consult Note:    Nicola Ray M.D.  Date & Time note created:    6/13/2020   10:04 AM     Patient ID:  Name:             Napoleon Zendejas    YOB: 1955  Age:                 65 y.o.  male  MRN:               [id number]    Referring MD:  Dr. Little                                                             Chief Complaint(s):      Bloody stool and hematemesis    History of Present Illness:    This is a very pleasant 65 y.o. male with the past medical history as listed below.    He presented to the emergency department for evaluation of bloody stool and bloody vomit.  He is been having vague abdominal discomfort and early satiety and decreased appetite for some time.  He does take ibuprofen sometimes but not daily.  Denies any blood thinner use.  Denies any heavy alcohol use.  States he has been having some episodes of dark stool and had some black stool bloody stool today.  Also had hematemesis where he states he vomited about a cup of bright red blood.  Denies any blood thinner use.  No chest pain cough shortness of breath no fevers or chills.  He denied LUQ pain. He does have about 34 lbs BWL in the past few months.     Otherwise the patient is doing fine without complaints of fever/chills/weight loss/appetite change/dysphagia/odynophagia/heartburn/bloating/constipation/diarrhea or abdominal pain.  ===  6/12/2020 EGD:  Esophagus: Grossly normal  Stomach: A large cratered ulcer with an adherent clot was seen in the fundus/cardia area, s/p s/p cold forceps biopsy. Another cratered ulcer with likely perforation from tumor growth was see adjacent to it. It is extremely difficult to visualize the ulcers, do biopsies and check the perforation due to angulation issue. The lesion was intermittently covered by fundal gastric folds too.  Duodenum: Grossly normal  No fresh blood nor blood clot was seen in the entire exam.     6/13/2020 Doing fine. No pain. Surgery toady.    Review of Systems:       Constitutional: Denies fevers, pos weight loss  Eyes: Denies changes in vision, jaundice  Ears/Nose/Throat/Mouth: Denies nasal congestion or sore throat   Cardiovascular: Denies chest pain or palpitations   Respiratory: Denies shortness of breath, denies cough  Gastrointestinal/Hepatic: Denies abdominal pain, nausea, vomiting, diarrhea, constipation; pos GI bleeding   Genitourinary: Denies dysuria or frequency  Musculoskeletal/Rheum: Denies  joint pain and swelling, edema  Skin: Denies rash  Neurological: Denies headache, confusion, memory loss or focal weakness/parasthesias  Psychiatric: denies mood disorder   Endocrine: Chayo thyroid problems  Heme/Oncology/Lymph Nodes: Denies enlarged lymph nodes, denies brusing or known bleeding disorder  All other systems were reviewed and are negative (AMA/CMS criteria)            ROS    Past Medical History:   Past Medical History:   Diagnosis Date   • Hypertension      Active Hospital Problems    Diagnosis   • GI bleed [K92.2]     Priority: High   • Sepsis (HCC) [A41.9]     Priority: High   • Acute blood loss anemia [D62]     Priority: Medium   • Thrombocytosis (HCC) [D47.3]   • Hyponatremia [E87.1]   • Hyperglycemia [R73.9]   • Gastric mass [K31.89]       Past Surgical History:  Past Surgical History:   Procedure Laterality Date   • PB UPPER GI ENDOSCOPY,BIOPSY  6/12/2020    Procedure: GASTROSCOPY, WITH BIOPSY;  Surgeon: Nicola Ray M.D.;  Location: SURGERY SAME DAY [facility];  Service: Gastroenterology       Hospital Medications:  Current Facility-Administered Medications   Medication Dose Frequency Provider Last Rate Last Dose   • cyanocobalamin (VITAMIN B-12) tablet 1,000 mcg  1,000 mcg DAILY Nicola Ray M.D.   1,000 mcg at 06/13/20 0602   • senna-docusate (PERICOLACE or SENOKOT S) 8.6-50 MG per tablet 2 Tab  2 Tab BID Sebastian Lambert D.O.   Stopped at 06/12/20 1800    And   • polyethylene glycol/lytes (MIRALAX) PACKET 1 Packet  1 Packet QDAY PRSATHISH Cortes  JACK Lambert D.O.        And   • magnesium hydroxide (MILK OF MAGNESIA) suspension 30 mL  30 mL QDAY PRN Sebastian Lambert D.O.        And   • bisacodyl (DULCOLAX) suppository 10 mg  10 mg QDAY PRN Sebastian Lambert D.O.       • lactated ringers infusion (BOLUS)  1,000 mL Once PRN Sebastian Lambert D.O.       • lactated ringers infusion   Continuous Nicola Ray M.D. 200 mL/hr at 06/13/20 0754     • lactated ringers infusion (BOLUS): BMI less than or equal to 30  30 mL/kg Once PRN Sebastian Lambert D.O.       • acetaminophen (TYLENOL) tablet 650 mg  650 mg Q6HRS PRN Sebastian Lambert D.O.       • cefTRIAXone (ROCEPHIN) 2 g in  mL IVPB  2 g Q24HRS MINDY Bowers.O.   Stopped at 06/12/20 2001   • metroNIDAZOLE (FLAGYL) IVPB 500 mg  500 mg Q8HRS RUTHIE BowersO.   Stopped at 06/13/20 0702   • enalaprilat (VASOTEC) injection 1.25 mg  1.25 mg Q6HRS PRN Sebastian Lambert D.O.       • ondansetron (ZOFRAN) syringe/vial injection 4 mg  4 mg Q4HRS PRN Sebastian Lambert D.O.       • ondansetron (ZOFRAN ODT) dispertab 4 mg  4 mg Q4HRS PRN Sebastian Lambert D.O.       • promethazine (PHENERGAN) tablet 12.5-25 mg  12.5-25 mg Q4HRS PRN Sebastian Lambert D.O.       • promethazine (PHENERGAN) suppository 12.5-25 mg  12.5-25 mg Q4HRS PRN Sebastian Lambert D.O.       • prochlorperazine (COMPAZINE) injection 5-10 mg  5-10 mg Q4HRS PRN RUTHIE BowersO.       • pantoprazole (PROTONIX) 80 mg in  mL Infusion  8 mg/hr Continuous RUTHIE BowersO. 25 mL/hr at 06/12/20 1804 8 mg/hr at 06/12/20 1804   Last reviewed on 6/12/2020  9:22 AM by Nya Verduzco R.N.      Current Outpatient Medications:  Medications Prior to Admission   Medication Sig Dispense Refill Last Dose   • lisinopril-hydrochlorothiazide (PRINZIDE) 20-12.5 MG per tablet Take 1 Tab by mouth every day.   6/10/2020 at Unknown time   • VITAMIN D PO Take  by mouth.          Medication Allergy:  No Known Allergies    Family History:  History reviewed. No pertinent  family history.    Social History:  Social History     Socioeconomic History   • Marital status: Single     Spouse name: Not on file   • Number of children: Not on file   • Years of education: Not on file   • Highest education level: Not on file   Occupational History   • Not on file   Social Needs   • Financial resource strain: Not on file   • Food insecurity     Worry: Not on file     Inability: Not on file   • Transportation needs     Medical: Not on file     Non-medical: Not on file   Tobacco Use   • Smoking status: Never Smoker   • Smokeless tobacco: Never Used   Substance and Sexual Activity   • Alcohol use: Never     Frequency: Never   • Drug use: Never   • Sexual activity: Not on file   Lifestyle   • Physical activity     Days per week: Not on file     Minutes per session: Not on file   • Stress: Not on file   Relationships   • Social connections     Talks on phone: Not on file     Gets together: Not on file     Attends [employer] service: Not on file     Active member of club or organization: Not on file     Attends meetings of clubs or organizations: Not on file     Relationship status: Not on file   • Intimate partner violence     Fear of current or ex partner: Not on file     Emotionally abused: Not on file     Physically abused: Not on file     Forced sexual activity: Not on file   Other Topics Concern   • Not on file   Social History Narrative   • Not on file       Physical Exam:  Weight/BMI: Body mass index is 30.17 kg/m².  /61   Pulse (!) 50   Temp 36.2 °C (97.1 °F) (Temporal)   Resp 12   Ht 1.829 m (6')   Wt 100.9 kg (222 lb 7.1 oz)   SpO2 92%   Vitals:    06/13/20 0341 06/13/20 0455 06/13/20 0510 06/13/20 0814   BP: 126/70 106/63 102/58 101/61   Pulse: 64 (!) 54 (!) 54 (!) 50   Resp: 18 16 18 12   Temp: 36.1 °C (97 °F) 36.4 °C (97.6 °F) 36.8 °C (98.2 °F) 36.2 °C (97.1 °F)   TempSrc: Temporal Temporal Temporal Temporal   SpO2: 92% 90% 97% 92%   Weight:       Height:         Oxygen  Therapy:  Pulse Oximetry: 92 %, O2 (LPM): 0, O2 Delivery Device: None - Room Air    Intake/Output Summary (Last 24 hours) at 6/13/2020 1004  Last data filed at 6/13/2020 0545  Gross per 24 hour   Intake 960 ml   Output 350 ml   Net 610 ml     Physical Exam     Constitutional:   Well developed, well nourished, no acute distress. Chronic ill looking  HEENT:  Normocephalic, Atraumatic, Conjunctiva pale, Sclera not icteric, Oropharynx moist mucous membranes, No oral exudates, Nose normal.  No thyromegaly.  Neck:  Normal range of motion, No cervical tenderness,  no JVD.  Chest/Lungs:  Symmetric expansion, no spider angioma, breath sounds clear to auscultation bilaterally,  no crackles, no wheezing.   Cardiovascular:  Normal heart rate, Normal rhythm, No murmurs, No rubs, No gallops.    Abdomen: Bowel sounds normal, Soft, No tenderness, No guarding, No rebound, No masses, No hepatosplenomegaly.  Extremities: No cyanosis/clubbing/edema/palmar erythema/flapping tremor  Skin: Warm, Dry, No erythema, No rash, no induration.    MDM (Data Review):     Records reviewed and summarized in current documentation    Lab Data Review:  Recent Results (from the past 24 hour(s))   Histology Request    Collection Time: 06/12/20 10:50 AM   Result Value Ref Range    Pathology Request Sent to Histo    Lactic Acid Every four hours after STAT order    Collection Time: 06/12/20 11:27 AM   Result Value Ref Range    Lactic Acid 1.2 0.5 - 2.0 mmol/L   HGB (Hemoglobin) for 48 hours    Collection Time: 06/12/20 11:27 AM   Result Value Ref Range    Hemoglobin 7.3 (L) 14.0 - 18.0 g/dL   HGB (Hemoglobin) for 48 hours    Collection Time: 06/12/20  7:23 PM   Result Value Ref Range    Hemoglobin 7.7 (L) 14.0 - 18.0 g/dL   Urinalysis    Collection Time: 06/12/20 10:42 PM    Specimen: Urine   Result Value Ref Range    Color Yellow     Character Clear     Specific Gravity 1.025 <1.035    Ph 5.5 5.0 - 8.0    Glucose Negative Negative mg/dL    Ketones Negative  Negative mg/dL    Protein Negative Negative mg/dL    Bilirubin Negative Negative    Urobilinogen, Urine 0.2 Negative    Nitrite Negative Negative    Leukocyte Esterase Negative Negative    Occult Blood Negative Negative    Micro Urine Req see below    Basic Metabolic Panel    Collection Time: 06/13/20  3:31 AM   Result Value Ref Range    Sodium 132 (L) 135 - 145 mmol/L    Potassium 4.0 3.6 - 5.5 mmol/L    Chloride 101 96 - 112 mmol/L    Co2 23 20 - 33 mmol/L    Glucose 110 (H) 65 - 99 mg/dL    Bun 14 8 - 22 mg/dL    Creatinine 0.76 0.50 - 1.40 mg/dL    Calcium 8.5 8.5 - 10.5 mg/dL    Anion Gap 8.0 7.0 - 16.0   CBC WITH DIFFERENTIAL    Collection Time: 06/13/20  3:31 AM   Result Value Ref Range    WBC 10.9 (H) 4.8 - 10.8 K/uL    RBC 2.48 (L) 4.70 - 6.10 M/uL    Hemoglobin 6.9 (L) 14.0 - 18.0 g/dL    Hematocrit 22.6 (L) 42.0 - 52.0 %    MCV 91.1 81.4 - 97.8 fL    MCH 27.8 27.0 - 33.0 pg    MCHC 30.5 (L) 33.7 - 35.3 g/dL    RDW 48.6 35.9 - 50.0 fL    Platelet Count 461 (H) 164 - 446 K/uL    MPV 8.5 (L) 9.0 - 12.9 fL    Neutrophils-Polys 76.50 (H) 44.00 - 72.00 %    Lymphocytes 13.70 (L) 22.00 - 41.00 %    Monocytes 8.20 0.00 - 13.40 %    Eosinophils 0.00 0.00 - 6.90 %    Basophils 0.20 0.00 - 1.80 %    Immature Granulocytes 1.40 (H) 0.00 - 0.90 %    Nucleated RBC 0.00 /100 WBC    Neutrophils (Absolute) 8.34 (H) 1.82 - 7.42 K/uL    Lymphs (Absolute) 1.49 1.00 - 4.80 K/uL    Monos (Absolute) 0.89 (H) 0.00 - 0.85 K/uL    Eos (Absolute) 0.00 0.00 - 0.51 K/uL    Baso (Absolute) 0.02 0.00 - 0.12 K/uL    Immature Granulocytes (abs) 0.15 (H) 0.00 - 0.11 K/uL    NRBC (Absolute) 0.00 K/uL   PHOSPHORUS    Collection Time: 06/13/20  3:31 AM   Result Value Ref Range    Phosphorus 3.1 2.5 - 4.5 mg/dL   MAGNESIUM    Collection Time: 06/13/20  3:31 AM   Result Value Ref Range    Magnesium 1.9 1.5 - 2.5 mg/dL   ESTIMATED GFR    Collection Time: 06/13/20  3:31 AM   Result Value Ref Range    GFR If African American >60 >60 mL/min/1.73 m  2    GFR If Non African American >60 >60 mL/min/1.73 m 2   EKG    Collection Time: 20  6:29 AM   Result Value Ref Range    Report       [facility]    Test Date:  2020  Pt Name:    SANTIAGO AQUINO                  Department: 171  MRN:        [id number]                      Room:       T734  Gender:     Male                         Technician: MADONNA  :        1955                   Requested By:HENRY RIDLEY  Order #:    [id number]                    Reading MD: Zeke Ward MD    Measurements  Intervals                                Axis  Rate:       45                           P:          20  MT:         188                          QRS:        4  QRSD:       120                          T:          3  QT:         488  QTc:        423    Interpretive Statements  SINUS BRADYCARDIA  NONSPECIFIC INTRAVENTRICULAR CONDUCTION DELAY  No previous ECG available for comparison  Electronically Signed On 2020 7:31:11 PDT by Zeke Ward MD         MDM (Assessment and Plan):     Active Hospital Problems    Diagnosis   • GI bleed [K92.2]     Priority: High   • Sepsis (HCC) [A41.9]     Priority: High   • Acute blood loss anemia [D62]     Priority: Medium   • Thrombocytosis (HCC) [D47.3]   • Hyponatremia [E87.1]   • Hyperglycemia [R73.9]   • Gastric mass [K31.89]       Imaging/Procedures Review:    CT-ABDOMEN-PELVIS WITH   Final Result         1.  Large heterogeneous masslike hypodensity within the spleen which contains air and nonspecific speckled areas of increased density. This is contiguous with the greater curvature of the stomach with associated gastric wall thickening and with air    likely tracking from the stomach into this splenic abnormality. Findings are most concerning for underlying malignancy with air in the splenic mass raising concern for possible superinfection. Endoscopic evaluation and biopsy should be considered.   2.  Mildly enlarged left para-aortic lymph node concerning for  jaymie metastasis. Additional shoddy and mildly prominent retroperitoneal lymph nodes.      These findings were discussed with JACQUELIN POWERS on 6/11/2020 6:30 PM.                  DX-CHEST-PORTABLE (1 VIEW)   Final Result      1.  There is no acute cardiopulmonary process.          Assessment  - Anemia  - Hematemesis  - Bloody stool  - Suspect gastric mass, r/o GIST with perforation and deep ulcers  - H/o colon polyps  - Hypertension    Plan  - Surgery today  - Path pending  - Likely will need Oncology eval once path is available  - Advise a screening colonoscopy as outpatient once everything is settled.  - Will sign off and stand by. Please contact us again if we can be of further assistance.     Thank you very much for allowing me to participate in the care of your patient.  Please feel free to contact me anytime at [phone].     Nicola Ray M.D.    Core Quality Measures   Reviewed items::  Labs, Medications and Radiology reports reviewed

## 2020-06-14 LAB
ALBUMIN SERPL BCP-MCNC: 2.2 G/DL (ref 3.2–4.9)
ALBUMIN/GLOB SERPL: 0.8 G/DL
ALP SERPL-CCNC: 57 U/L (ref 30–99)
ALT SERPL-CCNC: 14 U/L (ref 2–50)
ANION GAP SERPL CALC-SCNC: 8 MMOL/L (ref 7–16)
AST SERPL-CCNC: 22 U/L (ref 12–45)
BASOPHILS # BLD AUTO: 0.3 % (ref 0–1.8)
BASOPHILS # BLD: 0.06 K/UL (ref 0–0.12)
BILIRUB SERPL-MCNC: 0.3 MG/DL (ref 0.1–1.5)
BUN SERPL-MCNC: 11 MG/DL (ref 8–22)
CALCIUM SERPL-MCNC: 8.3 MG/DL (ref 8.5–10.5)
CHLORIDE SERPL-SCNC: 102 MMOL/L (ref 96–112)
CO2 SERPL-SCNC: 25 MMOL/L (ref 20–33)
CREAT SERPL-MCNC: 0.88 MG/DL (ref 0.5–1.4)
EOSINOPHIL # BLD AUTO: 0.03 K/UL (ref 0–0.51)
EOSINOPHIL NFR BLD: 0.2 % (ref 0–6.9)
ERYTHROCYTE [DISTWIDTH] IN BLOOD BY AUTOMATED COUNT: 48.1 FL (ref 35.9–50)
GLOBULIN SER CALC-MCNC: 2.6 G/DL (ref 1.9–3.5)
GLUCOSE SERPL-MCNC: 145 MG/DL (ref 65–99)
HCT VFR BLD AUTO: 33.7 % (ref 42–52)
HGB BLD-MCNC: 10.8 G/DL (ref 14–18)
IMM GRANULOCYTES # BLD AUTO: 0.19 K/UL (ref 0–0.11)
IMM GRANULOCYTES NFR BLD AUTO: 1.1 % (ref 0–0.9)
LYMPHOCYTES # BLD AUTO: 1.57 K/UL (ref 1–4.8)
LYMPHOCYTES NFR BLD: 8.8 % (ref 22–41)
MCH RBC QN AUTO: 28.9 PG (ref 27–33)
MCHC RBC AUTO-ENTMCNC: 32 G/DL (ref 33.7–35.3)
MCV RBC AUTO: 90.1 FL (ref 81.4–97.8)
MONOCYTES # BLD AUTO: 0.97 K/UL (ref 0–0.85)
MONOCYTES NFR BLD AUTO: 5.5 % (ref 0–13.4)
NEUTROPHILS # BLD AUTO: 14.96 K/UL (ref 1.82–7.42)
NEUTROPHILS NFR BLD: 84.1 % (ref 44–72)
NRBC # BLD AUTO: 0.03 K/UL
NRBC BLD-RTO: 0.2 /100 WBC
PLATELET # BLD AUTO: 575 K/UL (ref 164–446)
PMV BLD AUTO: 8.5 FL (ref 9–12.9)
POTASSIUM SERPL-SCNC: 4.9 MMOL/L (ref 3.6–5.5)
PROT SERPL-MCNC: 4.8 G/DL (ref 6–8.2)
RBC # BLD AUTO: 3.74 M/UL (ref 4.7–6.1)
SODIUM SERPL-SCNC: 135 MMOL/L (ref 135–145)
WBC # BLD AUTO: 17.8 K/UL (ref 4.8–10.8)

## 2020-06-14 PROCEDURE — 770006 HCHG ROOM/CARE - MED/SURG/GYN SEMI*

## 2020-06-14 PROCEDURE — 700111 HCHG RX REV CODE 636 W/ 250 OVERRIDE (IP): Performed by: NURSE PRACTITIONER

## 2020-06-14 PROCEDURE — C9113 INJ PANTOPRAZOLE SODIUM, VIA: HCPCS | Performed by: INTERNAL MEDICINE

## 2020-06-14 PROCEDURE — 99232 SBSQ HOSP IP/OBS MODERATE 35: CPT | Performed by: SURGERY

## 2020-06-14 PROCEDURE — 302129 PCA PLUS: Performed by: SURGERY

## 2020-06-14 PROCEDURE — 90648 HIB PRP-T VACCINE 4 DOSE IM: CPT | Performed by: NURSE PRACTITIONER

## 2020-06-14 PROCEDURE — 90471 IMMUNIZATION ADMIN: CPT

## 2020-06-14 PROCEDURE — C9113 INJ PANTOPRAZOLE SODIUM, VIA: HCPCS | Performed by: SURGERY

## 2020-06-14 PROCEDURE — 700105 HCHG RX REV CODE 258: Performed by: SURGERY

## 2020-06-14 PROCEDURE — 85025 COMPLETE CBC W/AUTO DIFF WBC: CPT

## 2020-06-14 PROCEDURE — 80053 COMPREHEN METABOLIC PANEL: CPT

## 2020-06-14 PROCEDURE — 94760 N-INVAS EAR/PLS OXIMETRY 1: CPT

## 2020-06-14 PROCEDURE — 700111 HCHG RX REV CODE 636 W/ 250 OVERRIDE (IP): Performed by: SURGERY

## 2020-06-14 PROCEDURE — 700101 HCHG RX REV CODE 250: Performed by: INTERNAL MEDICINE

## 2020-06-14 PROCEDURE — 94669 MECHANICAL CHEST WALL OSCILL: CPT

## 2020-06-14 PROCEDURE — 90715 TDAP VACCINE 7 YRS/> IM: CPT | Performed by: NURSE PRACTITIONER

## 2020-06-14 PROCEDURE — 90732 PPSV23 VACC 2 YRS+ SUBQ/IM: CPT | Performed by: NURSE PRACTITIONER

## 2020-06-14 PROCEDURE — 700111 HCHG RX REV CODE 636 W/ 250 OVERRIDE (IP): Performed by: INTERNAL MEDICINE

## 2020-06-14 PROCEDURE — 700105 HCHG RX REV CODE 258: Performed by: INTERNAL MEDICINE

## 2020-06-14 PROCEDURE — 90734 MENACWYD/MENACWYCRM VACC IM: CPT | Performed by: NURSE PRACTITIONER

## 2020-06-14 RX ORDER — M-VIT,TX,IRON,MINS/CALC/FOLIC 27MG-0.4MG
1 TABLET ORAL DAILY
Status: ON HOLD | COMMUNITY
End: 2020-07-14

## 2020-06-14 RX ORDER — PANTOPRAZOLE SODIUM 40 MG/10ML
40 INJECTION, POWDER, LYOPHILIZED, FOR SOLUTION INTRAVENOUS 2 TIMES DAILY
Status: DISCONTINUED | OUTPATIENT
Start: 2020-06-14 | End: 2020-06-23

## 2020-06-14 RX ADMIN — NEISSERIA MENINGITIDIS GROUP A CAPSULAR POLYSACCHARIDE DIPHTHERIA TOXOID CONJUGATE ANTIGEN, NEISSERIA MENINGITIDIS GROUP C CAPSULAR POLYSACCHARIDE DIPHTHERIA TOXOID CONJUGATE ANTIGEN, NEISSERIA MENINGITIDIS GROUP Y CAPSULAR POLYSACCHARIDE DIPHTHERIA TOXOID CONJUGATE ANTIGEN, AND NEISSERIA MENINGITIDIS GROUP W-135 CAPSULAR POLYSACCHARIDE DIPHTHERIA TOXOID CONJUGATE ANTIGEN 0.5 ML: 4; 4; 4; 4 INJECTION, SOLUTION INTRAMUSCULAR at 14:32

## 2020-06-14 RX ADMIN — METRONIDAZOLE 500 MG: 500 INJECTION, SOLUTION INTRAVENOUS at 05:57

## 2020-06-14 RX ADMIN — Medication: at 22:47

## 2020-06-14 RX ADMIN — Medication: at 00:12

## 2020-06-14 RX ADMIN — CEFTRIAXONE SODIUM 2 G: 2 INJECTION, POWDER, FOR SOLUTION INTRAMUSCULAR; INTRAVENOUS at 19:58

## 2020-06-14 RX ADMIN — SODIUM CHLORIDE, POTASSIUM CHLORIDE, SODIUM LACTATE AND CALCIUM CHLORIDE: 600; 310; 30; 20 INJECTION, SOLUTION INTRAVENOUS at 07:37

## 2020-06-14 RX ADMIN — SODIUM CHLORIDE, POTASSIUM CHLORIDE, SODIUM LACTATE AND CALCIUM CHLORIDE: 600; 310; 30; 20 INJECTION, SOLUTION INTRAVENOUS at 14:53

## 2020-06-14 RX ADMIN — HAEMOPHILUS B POLYSACCHARIDE CONJUGATE VACCINE FOR INJ 0.5 ML: RECON SOLN at 14:30

## 2020-06-14 RX ADMIN — SODIUM CHLORIDE 8 MG/HR: 9 INJECTION, SOLUTION INTRAVENOUS at 05:59

## 2020-06-14 RX ADMIN — PNEUMOCOCCAL VACCINE POLYVALENT 25 MCG
25; 25; 25; 25; 25; 25; 25; 25; 25; 25; 25; 25; 25; 25; 25; 25; 25; 25; 25; 25; 25; 25; 25 INJECTION, SOLUTION INTRAMUSCULAR; SUBCUTANEOUS at 14:27

## 2020-06-14 RX ADMIN — METRONIDAZOLE 500 MG: 500 INJECTION, SOLUTION INTRAVENOUS at 22:06

## 2020-06-14 RX ADMIN — METRONIDAZOLE 500 MG: 500 INJECTION, SOLUTION INTRAVENOUS at 13:48

## 2020-06-14 RX ADMIN — PANTOPRAZOLE SODIUM 40 MG: 40 INJECTION, POWDER, LYOPHILIZED, FOR SOLUTION INTRAVENOUS at 17:35

## 2020-06-14 RX ADMIN — CLOSTRIDIUM TETANI TOXOID ANTIGEN (FORMALDEHYDE INACTIVATED), CORYNEBACTERIUM DIPHTHERIAE TOXOID ANTIGEN (FORMALDEHYDE INACTIVATED), BORDETELLA PERTUSSIS TOXOID ANTIGEN (GLUTARALDEHYDE INACTIVATED), BORDETELLA PERTUSSIS FILAMENTOUS HEMAGGLUTININ ANTIGEN (FORMALDEHYDE INACTIVATED), BORDETELLA PERTUSSIS PERTACTIN ANTIGEN, AND BORDETELLA PERTUSSIS FIMBRIAE 2/3 ANTIGEN 0.5 ML: 5; 2; 2.5; 5; 3; 5 INJECTION, SUSPENSION INTRAMUSCULAR at 14:29

## 2020-06-14 ASSESSMENT — FIBROSIS 4 INDEX: FIB4 SCORE: 0.52

## 2020-06-14 ASSESSMENT — ENCOUNTER SYMPTOMS: FEVER: 0

## 2020-06-14 ASSESSMENT — LIFESTYLE VARIABLES: EVER_SMOKED: NEVER

## 2020-06-14 NOTE — PROGRESS NOTES
Dr. Willis updated on patient receiving q1H morphine IVP, and patient still rating pain 9/10. MD orders for Morphine PCA 1mg basal, 2 mg Q10min bolus with a 30mg Q4H lockout.

## 2020-06-14 NOTE — PROGRESS NOTES
Unable to perform 2 RN skin check, on back related to patient refusing because of pain, and not wanting to turn. Will continue to address.

## 2020-06-14 NOTE — PROGRESS NOTES
DATE: 6/14/2020    Post Operative Day 1 sleeve gastrectomy, splenectomy and partial resection of diaphragm    Interval Events:  Doing well. Pain controlled with PCA. Labs ok. Hemodynamically appropriate.    PHYSICAL EXAMINATION:  Vital Signs: /68   Pulse 60   Temp 36.2 °C (97.1 °F)   Resp (!) 25   Ht 1.829 m (6')   Wt 102.9 kg (226 lb 13.7 oz)   SpO2 94%     L CT to suction. No air leak  Abd Soft, Provena in place. SMOOTH serous    ASSESSMENT AND PLAN:   SP sleeve gastrectomy, splenectomy, partial diaphragm resection    Recommend Mobilize. DC Art line. Ok for tx to [facility]. UGI through NGT in 2 days..    Appreciate ICU and consulting physician care.       ____________________________________     Montse Moura M.D.

## 2020-06-14 NOTE — PROGRESS NOTES
Trauma / Surgical Daily Progress Note    Date of Service  6/14/2020    Chief Complaint  65 y.o. male admitted 6/11/2020 with Sepsis (HCC)   Exploratory celiotomy, a sleeve gastrectomy, splenectomy and   partial resection of the left hemidiaphragm. CT placement    Interval Events  Medically cleared by critical care and Dr. Moura for transfer to [facility]   CT output total 850cc/total   Nursing reports 300cc/shift since back from OR  Plan for UGI per Dr. Moura 6/16 via NG  Pain controlled  Pt resistant to mobilization.       Review of Systems  Review of Systems   Constitutional: Negative for fever.        Vital Signs  Temp:  [35.2 °C (95.3 °F)-37 °C (98.6 °F)] 36.2 °C (97.1 °F)  Pulse:  [60-89] 60  Resp:  [10-34] 25  BP: (104-126)/(46-75) 115/68  SpO2:  [91 %-99 %] 94 %    Physical Exam  Physical Exam  Constitutional:       Appearance: Normal appearance.   HENT:      Head: Normocephalic and atraumatic.      Right Ear: External ear normal.      Left Ear: External ear normal.      Nose: Nose normal.   Eyes:      Extraocular Movements: Extraocular movements intact.      Pupils: Pupils are equal, round, and reactive to light.   Neck:      Musculoskeletal: Normal range of motion and neck supple.   Cardiovascular:      Rate and Rhythm: Normal rate and regular rhythm.      Pulses: Normal pulses.      Heart sounds: Normal heart sounds.   Pulmonary:      Effort: No respiratory distress.      Comments: Left chest tube in place, with 850cc total  Nursing reports 300cc/shift  Abdominal:      General: There is distension.      Tenderness: There is abdominal tenderness.      Comments: Dressings in place.    Genitourinary:     Penis: Normal.       Comments: Gomez in place  Skin:     General: Skin is warm and dry.      Capillary Refill: Capillary refill takes less than 2 seconds.   Neurological:      General: No focal deficit present.      Mental Status: He is alert.      Cranial Nerves: No cranial nerve deficit.   Psychiatric:          Mood and Affect: Mood normal.         Behavior: Behavior normal.         Laboratory  Recent Results (from the past 24 hour(s))   CBC WITH DIFFERENTIAL    Collection Time: 06/13/20  2:50 PM   Result Value Ref Range    WBC 15.7 (H) 4.8 - 10.8 K/uL    RBC 3.58 (L) 4.70 - 6.10 M/uL    Hemoglobin 10.4 (L) 14.0 - 18.0 g/dL    Hematocrit 31.9 (L) 42.0 - 52.0 %    MCV 89.1 81.4 - 97.8 fL    MCH 29.1 27.0 - 33.0 pg    MCHC 32.6 (L) 33.7 - 35.3 g/dL    RDW 46.5 35.9 - 50.0 fL    Platelet Count 574 (H) 164 - 446 K/uL    MPV 8.4 (L) 9.0 - 12.9 fL    Neutrophils-Polys 83.10 (H) 44.00 - 72.00 %    Lymphocytes 10.50 (L) 22.00 - 41.00 %    Monocytes 3.80 0.00 - 13.40 %    Eosinophils 0.10 0.00 - 6.90 %    Basophils 0.30 0.00 - 1.80 %    Immature Granulocytes 2.20 (H) 0.00 - 0.90 %    Nucleated RBC 0.00 /100 WBC    Neutrophils (Absolute) 13.02 (H) 1.82 - 7.42 K/uL    Lymphs (Absolute) 1.65 1.00 - 4.80 K/uL    Monos (Absolute) 0.59 0.00 - 0.85 K/uL    Eos (Absolute) 0.01 0.00 - 0.51 K/uL    Baso (Absolute) 0.04 0.00 - 0.12 K/uL    Immature Granulocytes (abs) 0.35 (H) 0.00 - 0.11 K/uL    NRBC (Absolute) 0.00 K/uL   Comp Metabolic Panel    Collection Time: 06/13/20  2:50 PM   Result Value Ref Range    Sodium 135 135 - 145 mmol/L    Potassium 3.7 3.6 - 5.5 mmol/L    Chloride 102 96 - 112 mmol/L    Co2 23 20 - 33 mmol/L    Anion Gap 10.0 7.0 - 16.0    Glucose 197 (H) 65 - 99 mg/dL    Bun 11 8 - 22 mg/dL    Creatinine 0.76 0.50 - 1.40 mg/dL    Calcium 7.3 (L) 8.5 - 10.5 mg/dL    AST(SGOT) 18 12 - 45 U/L    ALT(SGPT) 15 2 - 50 U/L    Alkaline Phosphatase 55 30 - 99 U/L    Total Bilirubin 0.2 0.1 - 1.5 mg/dL    Albumin 2.3 (L) 3.2 - 4.9 g/dL    Total Protein 4.7 (L) 6.0 - 8.2 g/dL    Globulin 2.4 1.9 - 3.5 g/dL    A-G Ratio 1.0 g/dL   ESTIMATED GFR    Collection Time: 06/13/20  2:50 PM   Result Value Ref Range    GFR If African American >60 >60 mL/min/1.73 m 2    GFR If Non African American >60 >60 mL/min/1.73 m 2   Comp Metabolic Panel     Collection Time: 06/14/20  6:16 AM   Result Value Ref Range    Sodium 135 135 - 145 mmol/L    Potassium 4.9 3.6 - 5.5 mmol/L    Chloride 102 96 - 112 mmol/L    Co2 25 20 - 33 mmol/L    Anion Gap 8.0 7.0 - 16.0    Glucose 145 (H) 65 - 99 mg/dL    Bun 11 8 - 22 mg/dL    Creatinine 0.88 0.50 - 1.40 mg/dL    Calcium 8.3 (L) 8.5 - 10.5 mg/dL    AST(SGOT) 22 12 - 45 U/L    ALT(SGPT) 14 2 - 50 U/L    Alkaline Phosphatase 57 30 - 99 U/L    Total Bilirubin 0.3 0.1 - 1.5 mg/dL    Albumin 2.2 (L) 3.2 - 4.9 g/dL    Total Protein 4.8 (L) 6.0 - 8.2 g/dL    Globulin 2.6 1.9 - 3.5 g/dL    A-G Ratio 0.8 g/dL   ESTIMATED GFR    Collection Time: 06/14/20  6:16 AM   Result Value Ref Range    GFR If African American >60 >60 mL/min/1.73 m 2    GFR If Non African American >60 >60 mL/min/1.73 m 2   CBC WITH DIFFERENTIAL    Collection Time: 06/14/20  7:19 AM   Result Value Ref Range    WBC 17.8 (H) 4.8 - 10.8 K/uL    RBC 3.74 (L) 4.70 - 6.10 M/uL    Hemoglobin 10.8 (L) 14.0 - 18.0 g/dL    Hematocrit 33.7 (L) 42.0 - 52.0 %    MCV 90.1 81.4 - 97.8 fL    MCH 28.9 27.0 - 33.0 pg    MCHC 32.0 (L) 33.7 - 35.3 g/dL    RDW 48.1 35.9 - 50.0 fL    Platelet Count 575 (H) 164 - 446 K/uL    MPV 8.5 (L) 9.0 - 12.9 fL    Neutrophils-Polys 84.10 (H) 44.00 - 72.00 %    Lymphocytes 8.80 (L) 22.00 - 41.00 %    Monocytes 5.50 0.00 - 13.40 %    Eosinophils 0.20 0.00 - 6.90 %    Basophils 0.30 0.00 - 1.80 %    Immature Granulocytes 1.10 (H) 0.00 - 0.90 %    Nucleated RBC 0.20 /100 WBC    Neutrophils (Absolute) 14.96 (H) 1.82 - 7.42 K/uL    Lymphs (Absolute) 1.57 1.00 - 4.80 K/uL    Monos (Absolute) 0.97 (H) 0.00 - 0.85 K/uL    Eos (Absolute) 0.03 0.00 - 0.51 K/uL    Baso (Absolute) 0.06 0.00 - 0.12 K/uL    Immature Granulocytes (abs) 0.19 (H) 0.00 - 0.11 K/uL    NRBC (Absolute) 0.03 K/uL       Fluids    Intake/Output Summary (Last 24 hours) at 6/14/2020 1019  Last data filed at 6/14/2020 0800  Gross per 24 hour   Intake 4644.33 ml   Output 3250 ml   Net 1394.33  ml       Core Measures & Quality Metrics  Medications reviewed, Radiology images reviewed and Labs reviewed  Gomez catheter: Critically Ill - Requiring Accurate Measurement of Urinary Output      DVT Prophylaxis: Contraindicated - High bleeding risk  DVT prophylaxis - mechanical: SCDs  Ulcer prophylaxis: Yes  Antibiotics: Treating active infection/contamination beyond 24 hours perioperative coverage      RAP Score Total: 0    ETOH Screening    Assessment/Plan  S/P splenectomy  Assessment & Plan  6/13 - Splenectomy due to involvement  Needs Post splenectomy vaccinations    GI bleed- (present on admission)  Assessment & Plan  Large gastric mass invading into spleen  6/12 - EGD  6/13 - Sleeve gastrectomy, splenectomy, Resection of L hemidiaphragm (partial)  NGT to suction  On zosyn        Discussed patient condition with RN, Patient and trauma surgery. Dr. Willis    I saw and evaluated the patient and discussed his management with the trauma APRN, Orion Anglin. I reviewed the APRNs note and agree with the documented findings and plan of care. On exam he has clear lungs, dressings/drains are in place, and he is appropriate for transfer to the floor.    Malick Willis MD

## 2020-06-14 NOTE — CARE PLAN
Problem: Infection  Goal: Will remain free from infection  Outcome: PROGRESSING AS EXPECTED  Intervention: Implement standard precautions and perform hand washing before and after patient contact  Note: Administering abx, monitoring patient temperature and vitals, will continue to montior.      Problem: Pain Management  Goal: Pain level will decrease to patient's comfort goal  Outcome: PROGRESSING SLOWER THAN EXPECTED  Intervention: Follow pain managment plan developed in collaboration with patient and Interdisciplinary Team  Note: Patient reports high levels of pain, un relieved by PRNs, educated on rest and distraction, will continue to monitor.        Problem: Mobility  Goal: Risk for activity intolerance will decrease  Outcome: PROGRESSING SLOWER THAN EXPECTED  Intervention: Provide rest periods  Note: Patient refused mobility due to pain, will readdress, and provide further education on importance.

## 2020-06-14 NOTE — PROGRESS NOTES
2 RN Skin Check    2 RN skin check complete.   Devices in place: SCDs and Nasal Cannula.  Skin assessed under devices: yes.  Confirmed pressure ulcers found on: N/A.  New potential pressure ulcers noted on N/A. Wound consult placed N/A.  The following interventions in place Pillows.    - Midline incision, with prevena wound vac in place, CDI  - SMOOTH Drain LLQ, dressing in place CDI  - Chest tube L-flank, dressing CDI, -20mmHg suction    All other skin and bony prominences checked, blanching and intact, no areas of concern.

## 2020-06-14 NOTE — CARE PLAN
Problem: Safety  Goal: Will remain free from injury  Outcome: PROGRESSING AS EXPECTED  Note: Bed in low position with bed alarm on. Call light within reach. Lower bed rails in place. Treaded socks in use. Pt near nurses station.        Problem: Pain Management  Goal: Pain level will decrease to patient's comfort goal  Outcome: PROGRESSING AS EXPECTED  Note: Assessed patient's pain via 0-10 scale, PCA in use. Also providing repositions, extra pillows, emotional support and therapeutic presence.

## 2020-06-15 LAB
ALBUMIN SERPL BCP-MCNC: 2.3 G/DL (ref 3.2–4.9)
ALBUMIN/GLOB SERPL: 0.8 G/DL
ALP SERPL-CCNC: 61 U/L (ref 30–99)
ALT SERPL-CCNC: 11 U/L (ref 2–50)
ANION GAP SERPL CALC-SCNC: 6 MMOL/L (ref 7–16)
AST SERPL-CCNC: 13 U/L (ref 12–45)
BASOPHILS # BLD AUTO: 0.1 % (ref 0–1.8)
BASOPHILS # BLD: 0.03 K/UL (ref 0–0.12)
BILIRUB SERPL-MCNC: 0.2 MG/DL (ref 0.1–1.5)
BUN SERPL-MCNC: 10 MG/DL (ref 8–22)
CALCIUM SERPL-MCNC: 8.7 MG/DL (ref 8.5–10.5)
CHLORIDE SERPL-SCNC: 101 MMOL/L (ref 96–112)
CO2 SERPL-SCNC: 31 MMOL/L (ref 20–33)
CREAT SERPL-MCNC: 0.87 MG/DL (ref 0.5–1.4)
EOSINOPHIL # BLD AUTO: 0.03 K/UL (ref 0–0.51)
EOSINOPHIL NFR BLD: 0.1 % (ref 0–6.9)
ERYTHROCYTE [DISTWIDTH] IN BLOOD BY AUTOMATED COUNT: 50.9 FL (ref 35.9–50)
GLOBULIN SER CALC-MCNC: 2.8 G/DL (ref 1.9–3.5)
GLUCOSE SERPL-MCNC: 98 MG/DL (ref 65–99)
HCT VFR BLD AUTO: 34.3 % (ref 42–52)
HGB BLD-MCNC: 10.5 G/DL (ref 14–18)
IMM GRANULOCYTES # BLD AUTO: 0.18 K/UL (ref 0–0.11)
IMM GRANULOCYTES NFR BLD AUTO: 0.8 % (ref 0–0.9)
LYMPHOCYTES # BLD AUTO: 1.78 K/UL (ref 1–4.8)
LYMPHOCYTES NFR BLD: 8.2 % (ref 22–41)
MAGNESIUM SERPL-MCNC: 2.2 MG/DL (ref 1.5–2.5)
MCH RBC QN AUTO: 28.5 PG (ref 27–33)
MCHC RBC AUTO-ENTMCNC: 30.6 G/DL (ref 33.7–35.3)
MCV RBC AUTO: 93.2 FL (ref 81.4–97.8)
MONOCYTES # BLD AUTO: 1.31 K/UL (ref 0–0.85)
MONOCYTES NFR BLD AUTO: 6 % (ref 0–13.4)
NEUTROPHILS # BLD AUTO: 18.37 K/UL (ref 1.82–7.42)
NEUTROPHILS NFR BLD: 84.8 % (ref 44–72)
NRBC # BLD AUTO: 0.02 K/UL
NRBC BLD-RTO: 0.1 /100 WBC
PATHOLOGY CONSULT NOTE: NORMAL
PHOSPHATE SERPL-MCNC: 2.6 MG/DL (ref 2.5–4.5)
PLATELET # BLD AUTO: 627 K/UL (ref 164–446)
PMV BLD AUTO: 8.8 FL (ref 9–12.9)
POTASSIUM SERPL-SCNC: 4 MMOL/L (ref 3.6–5.5)
PROT SERPL-MCNC: 5.1 G/DL (ref 6–8.2)
RBC # BLD AUTO: 3.68 M/UL (ref 4.7–6.1)
SODIUM SERPL-SCNC: 138 MMOL/L (ref 135–145)
WBC # BLD AUTO: 21.7 K/UL (ref 4.8–10.8)

## 2020-06-15 PROCEDURE — 700105 HCHG RX REV CODE 258: Performed by: INTERNAL MEDICINE

## 2020-06-15 PROCEDURE — 700105 HCHG RX REV CODE 258: Performed by: SURGERY

## 2020-06-15 PROCEDURE — 99233 SBSQ HOSP IP/OBS HIGH 50: CPT | Performed by: HOSPITALIST

## 2020-06-15 PROCEDURE — 700111 HCHG RX REV CODE 636 W/ 250 OVERRIDE (IP): Performed by: INTERNAL MEDICINE

## 2020-06-15 PROCEDURE — 84100 ASSAY OF PHOSPHORUS: CPT

## 2020-06-15 PROCEDURE — 83735 ASSAY OF MAGNESIUM: CPT

## 2020-06-15 PROCEDURE — 94760 N-INVAS EAR/PLS OXIMETRY 1: CPT

## 2020-06-15 PROCEDURE — 700101 HCHG RX REV CODE 250: Performed by: INTERNAL MEDICINE

## 2020-06-15 PROCEDURE — 700102 HCHG RX REV CODE 250 W/ 637 OVERRIDE(OP): Performed by: INTERNAL MEDICINE

## 2020-06-15 PROCEDURE — 36415 COLL VENOUS BLD VENIPUNCTURE: CPT

## 2020-06-15 PROCEDURE — 94669 MECHANICAL CHEST WALL OSCILL: CPT

## 2020-06-15 PROCEDURE — 80053 COMPREHEN METABOLIC PANEL: CPT

## 2020-06-15 PROCEDURE — 85025 COMPLETE CBC W/AUTO DIFF WBC: CPT

## 2020-06-15 PROCEDURE — A9270 NON-COVERED ITEM OR SERVICE: HCPCS | Performed by: INTERNAL MEDICINE

## 2020-06-15 PROCEDURE — 770006 HCHG ROOM/CARE - MED/SURG/GYN SEMI*

## 2020-06-15 PROCEDURE — 700111 HCHG RX REV CODE 636 W/ 250 OVERRIDE (IP): Performed by: SURGERY

## 2020-06-15 PROCEDURE — C9113 INJ PANTOPRAZOLE SODIUM, VIA: HCPCS | Performed by: SURGERY

## 2020-06-15 RX ORDER — CYANOCOBALAMIN 1000 UG/ML
1000 INJECTION, SOLUTION INTRAMUSCULAR; SUBCUTANEOUS
Status: DISCONTINUED | OUTPATIENT
Start: 2020-06-15 | End: 2020-06-26 | Stop reason: HOSPADM

## 2020-06-15 RX ORDER — DEXTROSE, SODIUM CHLORIDE, SODIUM LACTATE, POTASSIUM CHLORIDE, AND CALCIUM CHLORIDE 5; .6; .31; .03; .02 G/100ML; G/100ML; G/100ML; G/100ML; G/100ML
INJECTION, SOLUTION INTRAVENOUS CONTINUOUS
Status: DISCONTINUED | OUTPATIENT
Start: 2020-06-15 | End: 2020-06-25

## 2020-06-15 RX ORDER — FLUCONAZOLE 2 MG/ML
200 INJECTION, SOLUTION INTRAVENOUS EVERY 24 HOURS
Status: COMPLETED | OUTPATIENT
Start: 2020-06-15 | End: 2020-06-22

## 2020-06-15 RX ADMIN — PANTOPRAZOLE SODIUM 40 MG: 40 INJECTION, POWDER, LYOPHILIZED, FOR SOLUTION INTRAVENOUS at 17:54

## 2020-06-15 RX ADMIN — METRONIDAZOLE 500 MG: 500 INJECTION, SOLUTION INTRAVENOUS at 22:11

## 2020-06-15 RX ADMIN — CEFTRIAXONE SODIUM 2 G: 2 INJECTION, POWDER, FOR SOLUTION INTRAMUSCULAR; INTRAVENOUS at 20:45

## 2020-06-15 RX ADMIN — SODIUM CHLORIDE, POTASSIUM CHLORIDE, SODIUM LACTATE AND CALCIUM CHLORIDE: 600; 310; 30; 20 INJECTION, SOLUTION INTRAVENOUS at 17:59

## 2020-06-15 RX ADMIN — METRONIDAZOLE 500 MG: 500 INJECTION, SOLUTION INTRAVENOUS at 05:20

## 2020-06-15 RX ADMIN — PANTOPRAZOLE SODIUM 40 MG: 40 INJECTION, POWDER, LYOPHILIZED, FOR SOLUTION INTRAVENOUS at 05:20

## 2020-06-15 RX ADMIN — SODIUM CHLORIDE, POTASSIUM CHLORIDE, SODIUM LACTATE AND CALCIUM CHLORIDE: 600; 310; 30; 20 INJECTION, SOLUTION INTRAVENOUS at 08:56

## 2020-06-15 RX ADMIN — METRONIDAZOLE 500 MG: 500 INJECTION, SOLUTION INTRAVENOUS at 14:07

## 2020-06-15 ASSESSMENT — ENCOUNTER SYMPTOMS
BLOOD IN STOOL: 1
EYES NEGATIVE: 1
FEVER: 0
CARDIOVASCULAR NEGATIVE: 1
WEIGHT LOSS: 1
WEAKNESS: 1
MYALGIAS: 1
PSYCHIATRIC NEGATIVE: 1
RESPIRATORY NEGATIVE: 1
ABDOMINAL PAIN: 1
CHILLS: 0

## 2020-06-15 NOTE — PROGRESS NOTES
[facility] Medicine Daily Progress Note    Date of Service  6/15/2020    Chief Complaint  65 y.o. male admitted 6/11/2020 with Bloody stools    Hospital Course    Mr. Zendejas is a 65 y.o. male has a PMHx of HTN, perforated gastric cancer, who presented on 6/11/2020 with dark stools.  Morning of admission the patient presented to his primary care provider and was told that he had an abnormal CBC.  Stool sample was positive for blood.  Patient does report fatigue and dark bloody stools.  He then developed lightheadedness and an episode of hematemesis.  He does report vague abdominal pain, early satiety, and decreased appetite for weeks. Denies heavy alcohol and no blood thinners.  This led to his presentation to the emergency room.  Patient was admitted with sepsis secondary to abdominal infection.  Started on ceftriaxone and metronidazole.  CT abdomen was also concerning for gastric mass.  GI was consulted Dr. Ray.  The patient was started on a pantoprazole drip.  EGD demonstrated a large cratered stomach ulcer in the fundus/cardia area status post cold forceps biopsy.  There was also another cratered ulcer with perforation from tumor growth surgery.  General surgery Dr. Moura was consulted. Patient admitted for further evaluation.         Interval Problem Update  -Patient seen and examined.  Patient report tenderness and soreness over abdomen. Drain in place. PCA in place. NGT in place. Patient encouraged to utilize pain medication and use IS. Patient aware in POC.  -POC: pain control; use IS; continue ABX therapy; UGI through NGT on 6/16 per Dr. Moura; PT/OT ordered  -Lab work - REVIEWED; unremarkable  -VSS at this time  -Ordered CBC and CMP for am    POD#2: sleeve gastrectomy, splenectomy and partial resection of the LEFT diaphragm.     Consultants/Specialty  -General Surgery - Dr. Moura  -Gastroenterology - Dr. Ray    Code Status  FULL    Disposition  TBD    Review of Systems  Review of Systems   Constitutional:  Positive for malaise/fatigue and weight loss. Negative for chills and fever.   HENT: Negative.    Eyes: Negative.    Respiratory: Negative.    Cardiovascular: Negative.    Gastrointestinal: Positive for abdominal pain, blood in stool and melena.   Genitourinary: Negative.    Musculoskeletal: Positive for myalgias.   Skin: Negative.    Neurological: Positive for weakness.   Psychiatric/Behavioral: Negative.         Physical Exam  Temp:  [36.2 °C (97.1 °F)-37.4 °C (99.3 °F)] 37 °C (98.6 °F)  Pulse:  [78-95] 94  Resp:  [16-20] 18  BP: (103-122)/(69-81) 109/77  SpO2:  [90 %-97 %] 90 %    Physical Exam  Vitals signs and nursing note reviewed.   HENT:      Head: Normocephalic.      Nose: Nose normal.      Mouth/Throat:      Mouth: Mucous membranes are moist.      Pharynx: Oropharynx is clear.   Eyes:      Pupils: Pupils are equal, round, and reactive to light.   Neck:      Musculoskeletal: Normal range of motion.   Cardiovascular:      Rate and Rhythm: Normal rate and regular rhythm.      Pulses: Normal pulses.      Heart sounds: Normal heart sounds.   Pulmonary:      Effort: Pulmonary effort is normal.   Abdominal:      General: Bowel sounds are normal.      Palpations: Abdomen is soft.      Tenderness: There is abdominal tenderness. There is guarding.      Comments: S/p sleeve gastrectomy, splenectomy and partial LEFT diaphragm resection   Musculoskeletal: Normal range of motion.         General: Tenderness present.   Skin:     General: Skin is warm.      Capillary Refill: Capillary refill takes 2 to 3 seconds.   Neurological:      Mental Status: He is alert. Mental status is at baseline.         Fluids    Intake/Output Summary (Last 24 hours) at 6/15/2020 1320  Last data filed at 6/15/2020 1200  Gross per 24 hour   Intake 1440.3 ml   Output 795 ml   Net 645.3 ml       Laboratory  Recent Labs     06/13/20  1450 06/14/20  0719 06/15/20  0537   WBC 15.7* 17.8* 21.7*   RBC 3.58* 3.74* 3.68*   HEMOGLOBIN 10.4* 10.8* 10.5*    HEMATOCRIT 31.9* 33.7* 34.3*   MCV 89.1 90.1 93.2   MCH 29.1 28.9 28.5   MCHC 32.6* 32.0* 30.6*   RDW 46.5 48.1 50.9*   PLATELETCT 574* 575* 627*   MPV 8.4* 8.5* 8.8*     Recent Labs     06/13/20  1450 06/14/20  0616 06/15/20  0537   SODIUM 135 135 138   POTASSIUM 3.7 4.9 4.0   CHLORIDE 102 102 101   CO2 23 25 31   GLUCOSE 197* 145* 98   BUN 11 11 10   CREATININE 0.76 0.88 0.87   CALCIUM 7.3* 8.3* 8.7                   Imaging  DX-CHEST-LIMITED (1 VIEW)   Final Result      Trace left pneumothorax. Left-sided chest tubes.      Blunting of the right costophrenic angle may be related to a trace pleural effusion.      Mild bibasilar atelectasis.         CT-ABDOMEN-PELVIS WITH   Final Result         1.  Large heterogeneous masslike hypodensity within the spleen which contains air and nonspecific speckled areas of increased density. This is contiguous with the greater curvature of the stomach with associated gastric wall thickening and with air    likely tracking from the stomach into this splenic abnormality. Findings are most concerning for underlying malignancy with air in the splenic mass raising concern for possible superinfection. Endoscopic evaluation and biopsy should be considered.   2.  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 Additional shoddy and mildly prominent retroperitoneal lymph nodes.      These findings were discussed with JACQUELIN POWERS on 6/11/2020 6:30 PM.                  DX-CHEST-PORTABLE (1 VIEW)   Final Result      1.  There is no acute cardiopulmonary process.           Assessment/Plan  S/P splenectomy  Assessment & Plan  -6/13- sleeve gastrectomy, splenectomy and partial LEFT diaphragm resection    Sepsis (HCC)- (present on admission)  Assessment & Plan  -This is Sepsis Present on admission  SIRS criteria identified on my evaluation include: Tachycardia, with heart rate greater than 90 BPM and Leukocytosis, with WBC greater than 12,000  -Source is  intra-abdominal  -Sepsis protocol initiated  -Fluid resuscitation ordered per protocol  -IV antibiotics as appropriate for source of sepsis  -While organ dysfunction may be noted elsewhere in this problem list or in the chart, degree of organ dysfunction does not meet CMS -criteria for severe sepsis  -Start ceftriaxone and metronidazole  -Await culture results  -CT abdomen/pelvis noted a large mass associated with the spleen and the stomach  -Radiology were also concerned about a possible superinfection  -Lactic acid has remained within normal range.  -Patient is at risk of worsening, does require close monitoring of his hemodynamics  -EGD confirms likely gastric mass with perforation as source of infection    GI bleed- (present on admission)  Assessment & Plan  -HIGH concern on admission due to hemoglobin drop  -came in with dark stool with blood in it and then red vomitus  -Monitor for bleeding  -ERP did discuss the case with GI who will follow along - EGD done  -Closely monitor for signs of hemodynamic instability  -EGD: Ulcer with adherent clot as noted above.      Acute blood loss anemia- (present on admission)  Assessment & Plan  -Likely from GI bleed complicated by gastric mass  -Patient is hemodynamically stable and asymptomatic  -Will continue to trend with CBC  -Transfuse to maintain hemoglobin greater than 7.  -Status post 1 unit of packed blood cells on 6/12/2020 and 6/13/2021  Results from last 7 days   Lab Units 06/13/20  1450 06/13/20  0331 06/12/20  1923 06/12/20  1127  06/12/20  0216 06/11/20  1553   HGB 1503 g/dL 10.4* 6.9* 7.7* 7.3*   < > 5.9* 8.0*   HCT 1504 % 31.9* 22.6*  --   --   --  18.9* 25.0*   MCV 1505 fL 89.1 91.1  --   --   --  87.9 85.3    < > = values in this interval not displayed.     Folate -Folic Acid   Date Value Ref Range Status   06/12/2020 11.0 >4.0 ng/mL Final     Vitamin B12 -True Cobalamin   Date Value Ref Range Status   06/12/2020 265 211 - 911 pg/mL Final     Ferritin    Date Value Ref Range Status   06/12/2020 546.0 (H) 22.0 - 322.0 ng/mL Final     Iron   Date Value Ref Range Status   06/12/2020 31 (L) 50 - 180 ug/dL Final     Total Iron Binding   Date Value Ref Range Status   06/12/2020 162 (L) 250 - 450 ug/dL Final     % Saturation   Date Value Ref Range Status   06/12/2020 19 15 - 55 % Final         Gastric mass- (present on admission)  Assessment & Plan  -Seen on CT and on exam with EGD.  -General surgery Dr. Moura is following surgery done on 6/13  -Sleeve gastrectomy, splenectomy and partial LEFT diaphragm resection    Hyperglycemia- (present on admission)  Assessment & Plan  -Mild, no need for coverage    Hyponatremia- (present on admission)  Assessment & Plan  -Improving  -Likely due to dehydration  -Start IV fluids  -Repeat BMP in the morning - continue to monitor    Thrombocytosis (HCC)- (present on admission)  Assessment & Plan  -Likely due to anemia, possibly dehydration  -Repeat CBC in the morning       VTE prophylaxis: SCDs  ----------------------------------------------------------------------------------------------------------------------------------------------------------------------------------------------------------------------  Please note that this dictation was created using voice recognition software. I have made every reasonable attempt to correct obvious errors, but there may be errors of grammar and possibly content that I did not discover before finalizing the note.    Electronically signed by:  SANDRA Boo, MSN, APRN, FNP-C  Hospitalist Services  [facility]  [phone]  [email or web address]  06/15/20   1715

## 2020-06-15 NOTE — PROGRESS NOTES
Report received from day shift RN, assumed Care.   Patient is AOx4, responds appropriately.      Pain controlled at this time via morphine PCA pump. Left chest tube in place to wall suction. NG tube to right nare at low continuous suction. SMOOTH in place to LLQ with small amount of serosanguinous output.   Patient is currently strict NPO, denies nausea/vomiting. + flatus  Up stand by assist with steady gait.    Plan of care discussed, all questions answered.    Educated on use of call light and importance of calling before getting out of bed. Pt verbalizes understanding.    Call light and belongings within reach, treaded slipper socks on, SCDs in use, bed in lowest locked position.  All needs met at this time.

## 2020-06-15 NOTE — DIETARY
Nutrition services: Day 4 of admit.  Napoleon Zendejas is a 65 y.o. male with admitting DX of Sepsis.   Consult received for MST 4 (34# wt loss x 3 months).     Met w/ pt at bedside. Reports decreased intake (< 50% of normal) in the last 1.5 months. States that UBW 2 months ago was ~250# (114 kg). Attributes weight loss and decreased PO to lack of appetite. Tolerated lunch and dinner with PO of % on 6/12.     Assessment:  Height: 182.9 cm (6')  Weight: 102.9 kg (226 lb 13.7 oz)  Body mass index is 30.77 kg/m²., BMI classification: Obese.   Diet/Intake: NPO.     Evaluation:   1. Pt with newly dx perforated gastric cancer, s/p sleeve gastrectomy, splenectomy and partial resection of left hemidiaphragm on 6/13.   2. NPO x 3 days.   3. Chest tube in place to wall suction, NG for low continuous suction and SMOOTH for suction.   4. Last BM 6/13, + flatus.   5. Reported 24# (10%) wt loss in 2 months (severe).     Malnutrition Risk: Pt with severe malnutrition in the context of chronic illness r/t newly dx gastric cancer as evidenced by severe wt loss of 10% x 2 months and PO < 75% of estimated needs x 1.5 months.     Recommendations/Plan:  1. Diet advancement as medically able.   2. Monitor weight.    RD Following.

## 2020-06-15 NOTE — CARE PLAN
Problem: Bowel/Gastric:  Goal: Normal bowel function is maintained or improved  Outcome: PROGRESSING AS EXPECTED  Note: NG in place to LCS, pt educated about diet     Problem: Knowledge Deficit  Goal: Knowledge of disease process/condition, treatment plan, diagnostic tests, and medications will improve  Outcome: PROGRESSING AS EXPECTED  Note: Updated on plan of care, educated about diet order, medications, labs, and lines

## 2020-06-15 NOTE — RESPIRATORY CARE
Oxygen Rounds      Patient found on    O2 L/m:  __3_______    Oxygen device:  ___nc_____   Spo2: _____92____%        Respiratory device skin site inspection completed.

## 2020-06-15 NOTE — CARE PLAN
Problem: Nutritional:  Goal: Achieve adequate nutritional intake  Description: Diet advancement, pt will consume 50% of meals  Outcome: NOT MET     See RD note.

## 2020-06-15 NOTE — PROGRESS NOTES
Bedside report received. Assessment completed.  Pt is A&O x4. Pt on 2 L NC  Pain 2/10. PCA pump in use.   Denies nausea.   - numbness, - tingling.  NG R nare to LCS.  Chest tube to L chest, -20 cm suction, +output, dressing CDI.  Midline abdominal incision with prevena, CDI.  LLQ SMOOTH drain, + output, dressing CDI.   Last BM PTA. -flatus, +void.  Strict NPO  Pt up SBA. Tolerates well.   Call light within reach. All needs met at this time. Fall Precautions and hourly rounding in place.

## 2020-06-15 NOTE — CARE PLAN
Problem: Safety  Goal: Will remain free from falls  Outcome: PROGRESSING AS EXPECTED   Educate patient on level of fall risk and ensure room is well lit and free of obstacles. Utilize both bed and chair alarms.    Problem: Knowledge Deficit  Goal: Knowledge of the prescribed therapeutic regimen will improve  Outcome: PROGRESSING AS EXPECTED   Educate patient on plan of care and encourage patient to ask questions.

## 2020-06-15 NOTE — PROGRESS NOTES
Attending Hospitalist is Dr Gongora starting at 0700. Please contact this physician for orders, updates or questions today.

## 2020-06-16 LAB
ANION GAP SERPL CALC-SCNC: 6 MMOL/L (ref 7–16)
BACTERIA BLD CULT: NORMAL
BACTERIA BLD CULT: NORMAL
BASOPHILS # BLD AUTO: 0.2 % (ref 0–1.8)
BASOPHILS # BLD: 0.04 K/UL (ref 0–0.12)
BUN SERPL-MCNC: 9 MG/DL (ref 8–22)
CALCIUM SERPL-MCNC: 8.2 MG/DL (ref 8.5–10.5)
CHLORIDE SERPL-SCNC: 101 MMOL/L (ref 96–112)
CO2 SERPL-SCNC: 30 MMOL/L (ref 20–33)
CREAT SERPL-MCNC: 0.7 MG/DL (ref 0.5–1.4)
EOSINOPHIL # BLD AUTO: 0.11 K/UL (ref 0–0.51)
EOSINOPHIL NFR BLD: 0.6 % (ref 0–6.9)
ERYTHROCYTE [DISTWIDTH] IN BLOOD BY AUTOMATED COUNT: 51.6 FL (ref 35.9–50)
GLUCOSE SERPL-MCNC: 93 MG/DL (ref 65–99)
HCT VFR BLD AUTO: 30.8 % (ref 42–52)
HGB BLD-MCNC: 9.7 G/DL (ref 14–18)
IMM GRANULOCYTES # BLD AUTO: 0.13 K/UL (ref 0–0.11)
IMM GRANULOCYTES NFR BLD AUTO: 0.7 % (ref 0–0.9)
LYMPHOCYTES # BLD AUTO: 1.4 K/UL (ref 1–4.8)
LYMPHOCYTES NFR BLD: 7.4 % (ref 22–41)
MAGNESIUM SERPL-MCNC: 2.1 MG/DL (ref 1.5–2.5)
MCH RBC QN AUTO: 29 PG (ref 27–33)
MCHC RBC AUTO-ENTMCNC: 31.5 G/DL (ref 33.7–35.3)
MCV RBC AUTO: 92.2 FL (ref 81.4–97.8)
MONOCYTES # BLD AUTO: 1.12 K/UL (ref 0–0.85)
MONOCYTES NFR BLD AUTO: 5.9 % (ref 0–13.4)
NEUTROPHILS # BLD AUTO: 16.15 K/UL (ref 1.82–7.42)
NEUTROPHILS NFR BLD: 85.2 % (ref 44–72)
NRBC # BLD AUTO: 0.03 K/UL
NRBC BLD-RTO: 0.2 /100 WBC
PHOSPHATE SERPL-MCNC: 2.8 MG/DL (ref 2.5–4.5)
PLATELET # BLD AUTO: 571 K/UL (ref 164–446)
PMV BLD AUTO: 8.5 FL (ref 9–12.9)
POTASSIUM SERPL-SCNC: 3.9 MMOL/L (ref 3.6–5.5)
PROCALCITONIN SERPL-MCNC: 2.34 NG/ML
RBC # BLD AUTO: 3.34 M/UL (ref 4.7–6.1)
SIGNIFICANT IND 70042: NORMAL
SIGNIFICANT IND 70042: NORMAL
SITE SITE: NORMAL
SITE SITE: NORMAL
SODIUM SERPL-SCNC: 137 MMOL/L (ref 135–145)
SOURCE SOURCE: NORMAL
SOURCE SOURCE: NORMAL
WBC # BLD AUTO: 19 K/UL (ref 4.8–10.8)

## 2020-06-16 PROCEDURE — 700111 HCHG RX REV CODE 636 W/ 250 OVERRIDE (IP): Performed by: HOSPITALIST

## 2020-06-16 PROCEDURE — 770006 HCHG ROOM/CARE - MED/SURG/GYN SEMI*

## 2020-06-16 PROCEDURE — 700101 HCHG RX REV CODE 250: Performed by: INTERNAL MEDICINE

## 2020-06-16 PROCEDURE — 99232 SBSQ HOSP IP/OBS MODERATE 35: CPT | Performed by: INTERNAL MEDICINE

## 2020-06-16 PROCEDURE — 84100 ASSAY OF PHOSPHORUS: CPT

## 2020-06-16 PROCEDURE — C9113 INJ PANTOPRAZOLE SODIUM, VIA: HCPCS | Performed by: SURGERY

## 2020-06-16 PROCEDURE — 94760 N-INVAS EAR/PLS OXIMETRY 1: CPT

## 2020-06-16 PROCEDURE — 700105 HCHG RX REV CODE 258: Performed by: INTERNAL MEDICINE

## 2020-06-16 PROCEDURE — 700111 HCHG RX REV CODE 636 W/ 250 OVERRIDE (IP): Performed by: SURGERY

## 2020-06-16 PROCEDURE — 36415 COLL VENOUS BLD VENIPUNCTURE: CPT

## 2020-06-16 PROCEDURE — 700105 HCHG RX REV CODE 258: Performed by: HOSPITALIST

## 2020-06-16 PROCEDURE — 700111 HCHG RX REV CODE 636 W/ 250 OVERRIDE (IP): Performed by: INTERNAL MEDICINE

## 2020-06-16 PROCEDURE — 84145 PROCALCITONIN (PCT): CPT

## 2020-06-16 PROCEDURE — 85025 COMPLETE CBC W/AUTO DIFF WBC: CPT

## 2020-06-16 PROCEDURE — 83735 ASSAY OF MAGNESIUM: CPT

## 2020-06-16 PROCEDURE — 80048 BASIC METABOLIC PNL TOTAL CA: CPT

## 2020-06-16 PROCEDURE — 94669 MECHANICAL CHEST WALL OSCILL: CPT

## 2020-06-16 RX ADMIN — METRONIDAZOLE 500 MG: 500 INJECTION, SOLUTION INTRAVENOUS at 13:04

## 2020-06-16 RX ADMIN — CEFTRIAXONE SODIUM 2 G: 2 INJECTION, POWDER, FOR SOLUTION INTRAMUSCULAR; INTRAVENOUS at 19:55

## 2020-06-16 RX ADMIN — METRONIDAZOLE 500 MG: 500 INJECTION, SOLUTION INTRAVENOUS at 05:37

## 2020-06-16 RX ADMIN — METRONIDAZOLE 500 MG: 500 INJECTION, SOLUTION INTRAVENOUS at 22:04

## 2020-06-16 RX ADMIN — SODIUM CHLORIDE, SODIUM LACTATE, POTASSIUM CHLORIDE, CALCIUM CHLORIDE AND DEXTROSE MONOHYDRATE: 5; 600; 310; 30; 20 INJECTION, SOLUTION INTRAVENOUS at 13:04

## 2020-06-16 RX ADMIN — PANTOPRAZOLE SODIUM 40 MG: 40 INJECTION, POWDER, LYOPHILIZED, FOR SOLUTION INTRAVENOUS at 05:37

## 2020-06-16 RX ADMIN — CYANOCOBALAMIN 1000 MCG: 1000 INJECTION, SOLUTION INTRAMUSCULAR; SUBCUTANEOUS at 05:45

## 2020-06-16 RX ADMIN — PANTOPRAZOLE SODIUM 40 MG: 40 INJECTION, POWDER, LYOPHILIZED, FOR SOLUTION INTRAVENOUS at 17:29

## 2020-06-16 RX ADMIN — SODIUM CHLORIDE, SODIUM LACTATE, POTASSIUM CHLORIDE, CALCIUM CHLORIDE AND DEXTROSE MONOHYDRATE: 5; 600; 310; 30; 20 INJECTION, SOLUTION INTRAVENOUS at 02:07

## 2020-06-16 RX ADMIN — FLUCONAZOLE, SODIUM CHLORIDE 200 MG: 2 INJECTION INTRAVENOUS at 02:06

## 2020-06-16 RX ADMIN — SODIUM CHLORIDE, SODIUM LACTATE, POTASSIUM CHLORIDE, CALCIUM CHLORIDE AND DEXTROSE MONOHYDRATE: 5; 600; 310; 30; 20 INJECTION, SOLUTION INTRAVENOUS at 22:09

## 2020-06-16 RX ADMIN — Medication: at 09:53

## 2020-06-16 RX ADMIN — PROCHLORPERAZINE EDISYLATE 10 MG: 5 INJECTION INTRAMUSCULAR; INTRAVENOUS at 15:57

## 2020-06-16 ASSESSMENT — ENCOUNTER SYMPTOMS
WEIGHT LOSS: 1
ORTHOPNEA: 0
ABDOMINAL PAIN: 1
EYES NEGATIVE: 1
FEVER: 0
PSYCHIATRIC NEGATIVE: 1
RESPIRATORY NEGATIVE: 1
PALPITATIONS: 0
WEAKNESS: 1
MYALGIAS: 1
CHILLS: 0
BLOOD IN STOOL: 1

## 2020-06-16 NOTE — PROGRESS NOTES
"    DATE: 6/15/2020    Post Operative Day  2 sleeve gastrectomy, splenectomy and partial resection of diaphragm.    Interval Events:  Gonzalez well controlled  Minimal from NG  Left CT with moderated drainage / no air leak  Drain with serosang fluid.    PHYSICAL EXAMINATION:  Vital Signs: /81   Pulse 84   Temp 37.3 °C (99.2 °F) (Temporal)   Resp 18   Ht 1.829 m (6' 0.01\")   Wt 102.9 kg (226 lb 13.7 oz)   SpO2 93%     L CT to suction. No air leak  Abd Soft, Provena in place. SMOOTH serous.    Laboratory Values:   Recent Labs     06/13/20  1450 06/14/20  0719 06/15/20  0537   WBC 15.7* 17.8* 21.7*   RBC 3.58* 3.74* 3.68*   HEMOGLOBIN 10.4* 10.8* 10.5*   HEMATOCRIT 31.9* 33.7* 34.3*   MCV 89.1 90.1 93.2   MCH 29.1 28.9 28.5   MCHC 32.6* 32.0* 30.6*   RDW 46.5 48.1 50.9*   PLATELETCT 574* 575* 627*   MPV 8.4* 8.5* 8.8*     Recent Labs     06/13/20  1450 06/14/20  0616 06/15/20  0537   SODIUM 135 135 138   POTASSIUM 3.7 4.9 4.0   CHLORIDE 102 102 101   CO2 23 25 31   GLUCOSE 197* 145* 98   BUN 11 11 10   CREATININE 0.76 0.88 0.87   CALCIUM 7.3* 8.3* 8.7     Recent Labs     06/13/20  1450 06/14/20  0616 06/15/20  0537   ASTSGOT 18 22 13   ALTSGPT 15 14 11   TBILIRUBIN 0.2 0.3 0.2   ALKPHOSPHAT 55 57 61   GLOBULIN 2.4 2.6 2.8            Imaging:   DX-CHEST-LIMITED (1 VIEW)   Final Result      Trace left pneumothorax. Left-sided chest tubes.      Blunting of the right costophrenic angle may be related to a trace pleural effusion.      Mild bibasilar atelectasis.         CT-ABDOMEN-PELVIS WITH   Final Result         1.  Large heterogeneous masslike hypodensity within the spleen which contains air and nonspecific speckled areas of increased density. This is contiguous with the greater curvature of the stomach with associated gastric wall thickening and with air    likely tracking from the stomach into this splenic abnormality. Findings are most concerning for underlying malignancy with air in the splenic mass raising " concern for possible superinfection. Endoscopic evaluation and biopsy should be considered.   2.  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 Additional shoddy and mildly prominent retroperitoneal lymph nodes.      These findings were discussed with JACQUELIN POWERS on 6/11/2020 6:30 PM.                  DX-CHEST-PORTABLE (1 VIEW)   Final Result      1.  There is no acute cardiopulmonary process.          ASSESSMENT AND PLAN:     S/P splenectomy  Assessment & Plan  6/13 - Splenectomy due to involvement  Needs Post splenectomy vaccinations    GI bleed- (present on admission)  Assessment & Plan  Large gastric mass invading into spleen  6/12 - EGD  6/13 - Sleeve gastrectomy, splenectomy, Resection of L hemidiaphragm (partial)  NGT to suction  On zosyn    Will need contrast study prior to starting po       ____________________________________     Dax Lua M.D.

## 2020-06-16 NOTE — CARE PLAN
"  Problem: Infection  Goal: Will remain free from infection  Outcome: PROGRESSING AS EXPECTED     Problem: Pain Management  Goal: Pain level will decrease to patient's comfort goal  Outcome: PROGRESSING AS EXPECTED  Note: PCA for pain management, pain well controlled. Patient states he hasn't felt like he \"needs the bolus dose\".      "

## 2020-06-16 NOTE — CARE PLAN
Problem: Knowledge Deficit  Goal: Knowledge of disease process/condition, treatment plan, diagnostic tests, and medications will improve  Outcome: PROGRESSING AS EXPECTED  Note: Updated on POC, educated about medications and drains     Problem: Pain Management  Goal: Pain level will decrease to patient's comfort goal  Outcome: PROGRESSING AS EXPECTED

## 2020-06-16 NOTE — PROGRESS NOTES
Received report and assumed care of patient at 1900. Reviewed chart and completed assessment. Patient is A&O x4, VSS on 2 liters o2 via NC. Strict NPO in place. Patient has NG to in right nare set to low continuous suction, no noticeable output. LLQ SMOOTH drain set to bulb suction, dressing CDI. Midline dressing with Prevena wound vac. PIV x3 in SARIKA, RFA, and Right hand - all patent, right hand SL. Left chest tube dressing CDI, set to -20 with minimal output. Patient had no c/o pain or nausea. Patient up with SBA, steady. Call light with in reach, low fall risk. Bed in low locked position, patient calls appropriately. Q2 hour rounding in place.

## 2020-06-16 NOTE — PROGRESS NOTES
[facility] Medicine Daily Progress Note    Date of Service  6/16/2020    Chief Complaint  65 y.o. male admitted 6/11/2020 with Bloody stools    Hospital Course    Mr. Zendejas is a 65 y.o. male has a PMHx of HTN, perforated gastric cancer, who presented on 6/11/2020 with dark stools.  Morning of admission the patient presented to his primary care provider and was told that he had an abnormal CBC.  Stool sample was positive for blood.  Patient does report fatigue and dark bloody stools.  He then developed lightheadedness and an episode of hematemesis.  He does report vague abdominal pain, early satiety, and decreased appetite for weeks. Denies heavy alcohol and no blood thinners.  This led to his presentation to the emergency room.  Patient was admitted with sepsis secondary to abdominal infection.  Started on ceftriaxone and metronidazole.  CT abdomen was also concerning for gastric mass.  GI was consulted Dr. Ray.  The patient was started on a pantoprazole drip.  EGD demonstrated a large cratered stomach ulcer in the fundus/cardia area status post cold forceps biopsy.  There was also another cratered ulcer with perforation from tumor growth surgery.  General surgery Dr. Moura was consulted. Patient admitted for further evaluation.         Interval Problem Update  -Patient seen and examined.  Patient report tenderness and soreness over abdomen. Drain in place. PCA in place. NGT in place. Patient encouraged to utilize pain medication and use IS. Patient aware in POC.  Pathology report from EGD shows large B cell lymphoma, so oncology has been consulted appreciate rec,   POD#3: sleeve gastrectomy, splenectomy and partial resection of the LEFT diaphragm.     Consultants/Specialty  -General Surgery - Dr. Moura  -Gastroenterology - Dr. Ray  -Oncology     Code Status  FULL    Disposition  TBD    Review of Systems  Review of Systems   Constitutional: Positive for malaise/fatigue and weight loss. Negative for chills and  fever.   HENT: Negative for congestion, ear pain and nosebleeds.    Eyes: Negative.    Respiratory: Negative.    Cardiovascular: Negative for chest pain, palpitations and orthopnea.   Gastrointestinal: Positive for abdominal pain, blood in stool and melena.   Genitourinary: Negative.    Musculoskeletal: Positive for myalgias.   Skin: Negative.    Neurological: Positive for weakness.   Psychiatric/Behavioral: Negative.         Physical Exam  Temp:  [36.3 °C (97.4 °F)-37.3 °C (99.2 °F)] 36.6 °C (97.8 °F)  Pulse:  [] 86  Resp:  [16-18] 18  BP: (113-134)/(68-83) 124/77  SpO2:  [92 %-94 %] 93 %    Physical Exam  Vitals signs and nursing note reviewed.   HENT:      Head: Normocephalic.      Nose: Nose normal.      Mouth/Throat:      Mouth: Mucous membranes are moist.      Pharynx: Oropharynx is clear.   Eyes:      General: No scleral icterus.     Pupils: Pupils are equal, round, and reactive to light.   Neck:      Musculoskeletal: Normal range of motion.   Cardiovascular:      Rate and Rhythm: Normal rate and regular rhythm.      Pulses: Normal pulses.      Heart sounds: Normal heart sounds.   Pulmonary:      Effort: Pulmonary effort is normal.      Breath sounds: No wheezing or rhonchi.   Abdominal:      General: Bowel sounds are normal.      Palpations: Abdomen is soft.      Tenderness: There is abdominal tenderness. There is guarding.      Comments: S/p sleeve gastrectomy, splenectomy and partial LEFT diaphragm resection   Musculoskeletal: Normal range of motion.         General: Tenderness present.   Skin:     General: Skin is warm.      Capillary Refill: Capillary refill takes 2 to 3 seconds.   Neurological:      Mental Status: He is alert. Mental status is at baseline.         Fluids    Intake/Output Summary (Last 24 hours) at 6/16/2020 1401  Last data filed at 6/16/2020 1200  Gross per 24 hour   Intake 2122.28 ml   Output 1710 ml   Net 412.28 ml       Laboratory  Recent Labs     06/14/20  0719 06/15/20  0591  06/16/20  0502   WBC 17.8* 21.7* 19.0*   RBC 3.74* 3.68* 3.34*   HEMOGLOBIN 10.8* 10.5* 9.7*   HEMATOCRIT 33.7* 34.3* 30.8*   MCV 90.1 93.2 92.2   MCH 28.9 28.5 29.0   MCHC 32.0* 30.6* 31.5*   RDW 48.1 50.9* 51.6*   PLATELETCT 575* 627* 571*   MPV 8.5* 8.8* 8.5*     Recent Labs     06/14/20  0616 06/15/20  0537 06/16/20  0502   SODIUM 135 138 137   POTASSIUM 4.9 4.0 3.9   CHLORIDE 102 101 101   CO2 25 31 30   GLUCOSE 145* 98 93   BUN 11 10 9   CREATININE 0.88 0.87 0.70   CALCIUM 8.3* 8.7 8.2*                   Imaging  DX-CHEST-LIMITED (1 VIEW)   Final Result      Trace left pneumothorax. Left-sided chest tubes.      Blunting of the right costophrenic angle may be related to a trace pleural effusion.      Mild bibasilar atelectasis.         CT-ABDOMEN-PELVIS WITH   Final Result         1.  Large heterogeneous masslike hypodensity within the spleen which contains air and nonspecific speckled areas of increased density. This is contiguous with the greater curvature of the stomach with associated gastric wall thickening and with air    likely tracking from the stomach into this splenic abnormality. Findings are most concerning for underlying malignancy with air in the splenic mass raising concern for possible superinfection. Endoscopic evaluation and biopsy should be considered.   2.  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 Additional shoddy and mildly prominent retroperitoneal lymph nodes.      These findings were discussed with JACQUELIN POWERS on 6/11/2020 6:30 PM.                  DX-CHEST-PORTABLE (1 VIEW)   Final Result      1.  There is no acute cardiopulmonary process.           Assessment/Plan  Gastric mass- (present on admission)  Assessment & Plan  -Seen on CT and on exam with EGD.  -General surgery Dr. Moura is following surgery done on 6/13  -Sleeve gastrectomy, splenectomy and partial LEFT diaphragm resection  - results of biopsy from EGD showing large B cell Lymphoma  Oncology, Dr. James consulted appreciate rec.     GI bleed- (present on admission)  Assessment & Plan  -HIGH concern on admission due to hemoglobin drop  -came in with dark stool with blood in it and then red vomitus  -Monitor for bleeding  -ERP did discuss the case with GI who will follow along - EGD done  -Closely monitor for signs of hemodynamic instability  -EGD: Ulcer with adherent clot as noted above.      S/P splenectomy  Assessment & Plan  -6/13- sleeve gastrectomy, splenectomy and partial LEFT diaphragm resection    Sepsis (HCC)- (present on admission)  Assessment & Plan  -This is Sepsis Present on admission  SIRS criteria identified on my evaluation include: Tachycardia, with heart rate greater than 90 BPM and Leukocytosis, with WBC greater than 12,000  -Source is intra-abdominal  -Sepsis protocol initiated  -Fluid resuscitation ordered per protocol  -IV antibiotics as appropriate for source of sepsis  -While organ dysfunction may be noted elsewhere in this problem list or in the chart, degree of organ dysfunction does not meet CMS -criteria for severe sepsis  -Start ceftriaxone and metronidazole  -Await culture results  -CT abdomen/pelvis noted a large mass associated with the spleen and the stomach  -Radiology were also concerned about a possible superinfection  -Lactic acid has remained within normal range.  -Patient is at risk of worsening, does require close monitoring of his hemodynamics  -EGD confirms likely gastric mass with perforation as source of infection    Acute blood loss anemia- (present on admission)  Assessment & Plan  -Likely from GI bleed complicated by gastric mass  -Patient is hemodynamically stable and asymptomatic  -Will continue to trend with CBC  -Transfuse to maintain hemoglobin greater than 7.  -Status post 1 unit of packed blood cells on 6/12/2020 and 6/13/2021  Results from last 7 days   Lab Units 06/13/20  1450 06/13/20  0331 06/12/20  1923 06/12/20  1127  06/12/20  0216  06/11/20  1553   HGB 1503 g/dL 10.4* 6.9* 7.7* 7.3*   < > 5.9* 8.0*   HCT 1504 % 31.9* 22.6*  --   --   --  18.9* 25.0*   MCV 1505 fL 89.1 91.1  --   --   --  87.9 85.3    < > = values in this interval not displayed.     Folate -Folic Acid   Date Value Ref Range Status   06/12/2020 11.0 >4.0 ng/mL Final     Vitamin B12 -True Cobalamin   Date Value Ref Range Status   06/12/2020 265 211 - 911 pg/mL Final     Ferritin   Date Value Ref Range Status   06/12/2020 546.0 (H) 22.0 - 322.0 ng/mL Final     Iron   Date Value Ref Range Status   06/12/2020 31 (L) 50 - 180 ug/dL Final     Total Iron Binding   Date Value Ref Range Status   06/12/2020 162 (L) 250 - 450 ug/dL Final     % Saturation   Date Value Ref Range Status   06/12/2020 19 15 - 55 % Final         Hyperglycemia- (present on admission)  Assessment & Plan  -Mild, no need for coverage    Hyponatremia- (present on admission)  Assessment & Plan  -Improving  -Likely due to dehydration  -Start IV fluids  -Repeat BMP in the morning - continue to monitor    Thrombocytosis (HCC)- (present on admission)  Assessment & Plan  -Likely due to anemia, possibly dehydration  -Repeat CBC in the morning       VTE prophylaxis: SCDs

## 2020-06-16 NOTE — PROGRESS NOTES
Bedside report received. Assessment completed.  Pt is A&O x4. Pt on 2 L NC  Pain 1/10. PCA pump in use.   Denies nausea.   - numbness, - tingling.  NG R nare to LCS.  Chest tube to L chest, -20 cm suction, dressing CDI.  Midline abdominal incision with prevena, CDI.  LLQ SMOOTH drain, + output, dressing CDI.   Last BM PTA. -flatus, +void.  Strict NPO.  Pt up SBA. Tolerates well.   Call light within reach. All needs met at this time. Fall Precautions and hourly rounding in place.

## 2020-06-17 ENCOUNTER — APPOINTMENT (OUTPATIENT)
Dept: RADIOLOGY | Facility: MEDICAL CENTER | Age: 65
DRG: 853 | End: 2020-06-17
Attending: SURGERY
Payer: COMMERCIAL

## 2020-06-17 LAB
ALBUMIN SERPL BCP-MCNC: 2 G/DL (ref 3.2–4.9)
ALBUMIN/GLOB SERPL: 0.7 G/DL
ALP SERPL-CCNC: 62 U/L (ref 30–99)
ALT SERPL-CCNC: 8 U/L (ref 2–50)
ANION GAP SERPL CALC-SCNC: 6 MMOL/L (ref 7–16)
AST SERPL-CCNC: 11 U/L (ref 12–45)
BASOPHILS # BLD AUTO: 0.4 % (ref 0–1.8)
BASOPHILS # BLD: 0.07 K/UL (ref 0–0.12)
BILIRUB SERPL-MCNC: 0.5 MG/DL (ref 0.1–1.5)
BUN SERPL-MCNC: 7 MG/DL (ref 8–22)
CALCIUM SERPL-MCNC: 8.1 MG/DL (ref 8.5–10.5)
CHLORIDE SERPL-SCNC: 100 MMOL/L (ref 96–112)
CO2 SERPL-SCNC: 31 MMOL/L (ref 20–33)
CREAT SERPL-MCNC: 0.6 MG/DL (ref 0.5–1.4)
EOSINOPHIL # BLD AUTO: 0.32 K/UL (ref 0–0.51)
EOSINOPHIL NFR BLD: 2 % (ref 0–6.9)
ERYTHROCYTE [DISTWIDTH] IN BLOOD BY AUTOMATED COUNT: 50.9 FL (ref 35.9–50)
GLOBULIN SER CALC-MCNC: 2.8 G/DL (ref 1.9–3.5)
GLUCOSE SERPL-MCNC: 114 MG/DL (ref 65–99)
HCT VFR BLD AUTO: 30.3 % (ref 42–52)
HGB BLD-MCNC: 9.4 G/DL (ref 14–18)
IMM GRANULOCYTES # BLD AUTO: 0.23 K/UL (ref 0–0.11)
IMM GRANULOCYTES NFR BLD AUTO: 1.4 % (ref 0–0.9)
IRON SATN MFR SERPL: ABNORMAL % (ref 15–55)
IRON SERPL-MCNC: 9 UG/DL (ref 50–180)
LYMPHOCYTES # BLD AUTO: 1.6 K/UL (ref 1–4.8)
LYMPHOCYTES NFR BLD: 9.8 % (ref 22–41)
MCH RBC QN AUTO: 28.6 PG (ref 27–33)
MCHC RBC AUTO-ENTMCNC: 31 G/DL (ref 33.7–35.3)
MCV RBC AUTO: 92.1 FL (ref 81.4–97.8)
MONOCYTES # BLD AUTO: 1.37 K/UL (ref 0–0.85)
MONOCYTES NFR BLD AUTO: 8.4 % (ref 0–13.4)
NEUTROPHILS # BLD AUTO: 12.77 K/UL (ref 1.82–7.42)
NEUTROPHILS NFR BLD: 78 % (ref 44–72)
NRBC # BLD AUTO: 0.03 K/UL
NRBC BLD-RTO: 0.2 /100 WBC
PLATELET # BLD AUTO: 632 K/UL (ref 164–446)
PMV BLD AUTO: 8.7 FL (ref 9–12.9)
POTASSIUM SERPL-SCNC: 3.8 MMOL/L (ref 3.6–5.5)
PROT SERPL-MCNC: 4.8 G/DL (ref 6–8.2)
RBC # BLD AUTO: 3.29 M/UL (ref 4.7–6.1)
SODIUM SERPL-SCNC: 137 MMOL/L (ref 135–145)
TIBC SERPL-MCNC: 132 UG/DL (ref 250–450)
UIBC SERPL-MCNC: 123 UG/DL (ref 110–370)
WBC # BLD AUTO: 16.4 K/UL (ref 4.8–10.8)

## 2020-06-17 PROCEDURE — 700117 HCHG RX CONTRAST REV CODE 255: Performed by: SURGERY

## 2020-06-17 PROCEDURE — 700111 HCHG RX REV CODE 636 W/ 250 OVERRIDE (IP): Performed by: SURGERY

## 2020-06-17 PROCEDURE — 700111 HCHG RX REV CODE 636 W/ 250 OVERRIDE (IP): Performed by: HOSPITALIST

## 2020-06-17 PROCEDURE — 700105 HCHG RX REV CODE 258: Performed by: SURGERY

## 2020-06-17 PROCEDURE — 94669 MECHANICAL CHEST WALL OSCILL: CPT

## 2020-06-17 PROCEDURE — 83540 ASSAY OF IRON: CPT

## 2020-06-17 PROCEDURE — C9113 INJ PANTOPRAZOLE SODIUM, VIA: HCPCS | Performed by: SURGERY

## 2020-06-17 PROCEDURE — A9270 NON-COVERED ITEM OR SERVICE: HCPCS | Performed by: INTERNAL MEDICINE

## 2020-06-17 PROCEDURE — 80053 COMPREHEN METABOLIC PANEL: CPT

## 2020-06-17 PROCEDURE — 700111 HCHG RX REV CODE 636 W/ 250 OVERRIDE (IP): Performed by: INTERNAL MEDICINE

## 2020-06-17 PROCEDURE — 71045 X-RAY EXAM CHEST 1 VIEW: CPT

## 2020-06-17 PROCEDURE — 36415 COLL VENOUS BLD VENIPUNCTURE: CPT

## 2020-06-17 PROCEDURE — 700102 HCHG RX REV CODE 250 W/ 637 OVERRIDE(OP): Performed by: INTERNAL MEDICINE

## 2020-06-17 PROCEDURE — 99232 SBSQ HOSP IP/OBS MODERATE 35: CPT | Performed by: INTERNAL MEDICINE

## 2020-06-17 PROCEDURE — 700101 HCHG RX REV CODE 250: Performed by: INTERNAL MEDICINE

## 2020-06-17 PROCEDURE — 74240 X-RAY XM UPR GI TRC 1CNTRST: CPT

## 2020-06-17 PROCEDURE — 700105 HCHG RX REV CODE 258: Performed by: INTERNAL MEDICINE

## 2020-06-17 PROCEDURE — 770006 HCHG ROOM/CARE - MED/SURG/GYN SEMI*

## 2020-06-17 PROCEDURE — 83550 IRON BINDING TEST: CPT

## 2020-06-17 PROCEDURE — 85025 COMPLETE CBC W/AUTO DIFF WBC: CPT

## 2020-06-17 PROCEDURE — 700105 HCHG RX REV CODE 258: Performed by: HOSPITALIST

## 2020-06-17 RX ORDER — SODIUM CHLORIDE 9 MG/ML
INJECTION, SOLUTION INTRAVENOUS
Status: ACTIVE
Start: 2020-06-17 | End: 2020-06-18

## 2020-06-17 RX ADMIN — IOHEXOL 90 ML: 300 INJECTION, SOLUTION INTRAVENOUS at 10:19

## 2020-06-17 RX ADMIN — METRONIDAZOLE 500 MG: 500 INJECTION, SOLUTION INTRAVENOUS at 13:51

## 2020-06-17 RX ADMIN — PANTOPRAZOLE SODIUM 40 MG: 40 INJECTION, POWDER, LYOPHILIZED, FOR SOLUTION INTRAVENOUS at 05:11

## 2020-06-17 RX ADMIN — SODIUM CHLORIDE, SODIUM LACTATE, POTASSIUM CHLORIDE, CALCIUM CHLORIDE AND DEXTROSE MONOHYDRATE: 5; 600; 310; 30; 20 INJECTION, SOLUTION INTRAVENOUS at 08:35

## 2020-06-17 RX ADMIN — METRONIDAZOLE 500 MG: 500 INJECTION, SOLUTION INTRAVENOUS at 21:51

## 2020-06-17 RX ADMIN — FLUCONAZOLE, SODIUM CHLORIDE 200 MG: 2 INJECTION INTRAVENOUS at 05:11

## 2020-06-17 RX ADMIN — SODIUM CHLORIDE 125 MG: 9 INJECTION, SOLUTION INTRAVENOUS at 19:50

## 2020-06-17 RX ADMIN — SODIUM CHLORIDE, SODIUM LACTATE, POTASSIUM CHLORIDE, CALCIUM CHLORIDE AND DEXTROSE MONOHYDRATE: 5; 600; 310; 30; 20 INJECTION, SOLUTION INTRAVENOUS at 20:18

## 2020-06-17 RX ADMIN — DOCUSATE SODIUM 50 MG AND SENNOSIDES 8.6 MG 2 TABLET: 8.6; 5 TABLET, FILM COATED ORAL at 18:18

## 2020-06-17 RX ADMIN — CEFTRIAXONE SODIUM 2 G: 2 INJECTION, POWDER, FOR SOLUTION INTRAMUSCULAR; INTRAVENOUS at 19:53

## 2020-06-17 RX ADMIN — METRONIDAZOLE 500 MG: 500 INJECTION, SOLUTION INTRAVENOUS at 06:15

## 2020-06-17 RX ADMIN — PANTOPRAZOLE SODIUM 40 MG: 40 INJECTION, POWDER, LYOPHILIZED, FOR SOLUTION INTRAVENOUS at 18:18

## 2020-06-17 ASSESSMENT — ENCOUNTER SYMPTOMS
SINUS PAIN: 0
PSYCHIATRIC NEGATIVE: 1
SPUTUM PRODUCTION: 0
ORTHOPNEA: 0
FEVER: 0
EYES NEGATIVE: 1
WEIGHT LOSS: 1
BLOOD IN STOOL: 1
ABDOMINAL PAIN: 1
COUGH: 0
WEAKNESS: 1
PALPITATIONS: 0
CHILLS: 0
MYALGIAS: 1

## 2020-06-17 NOTE — PROGRESS NOTES
[facility] Medicine Daily Progress Note    Date of Service  6/17/2020    Chief Complaint  65 y.o. male admitted 6/11/2020 with Bloody stools    Hospital Course    Mr. Zendejas is a 65 y.o. male has a PMHx of HTN, perforated gastric cancer, who presented on 6/11/2020 with dark stools.  Morning of admission the patient presented to his primary care provider and was told that he had an abnormal CBC.  Stool sample was positive for blood.  Patient does report fatigue and dark bloody stools.  He then developed lightheadedness and an episode of hematemesis.  He does report vague abdominal pain, early satiety, and decreased appetite for weeks. Denies heavy alcohol and no blood thinners.  This led to his presentation to the emergency room.  Patient was admitted with sepsis secondary to abdominal infection.  Started on ceftriaxone and metronidazole.  CT abdomen was also concerning for gastric mass.  GI was consulted Dr. Ray.  The patient was started on a pantoprazole drip.  EGD demonstrated a large cratered stomach ulcer in the fundus/cardia area status post cold forceps biopsy.  There was also another cratered ulcer with perforation from tumor growth surgery.  General surgery Dr. Moura was consulted. Patient admitted for further evaluation.         Interval Problem Update  No acute events overnight, has some abdominal pain, Surgey following, UGI today   Appreciate rec.   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     Consultants/Specialty  -General Surgery - Dr. Moura  -Gastroenterology - Dr. Ray  -Oncology     Code Status  FULL    Disposition  TBD    Review of Systems  Review of Systems   Constitutional: Positive for malaise/fatigue and weight loss. Negative for chills and fever.   HENT: Negative for congestion, ear pain and sinus pain.    Eyes: Negative.    Respiratory: Negative for  cough and sputum production.    Cardiovascular: Negative for chest pain, palpitations and orthopnea.   Gastrointestinal: Positive for abdominal pain, blood in stool and melena.   Genitourinary: Negative.    Musculoskeletal: Positive for myalgias.   Skin: Negative.    Neurological: Positive for weakness.   Psychiatric/Behavioral: Negative.    All other systems reviewed and are negative.       Physical Exam  Temp:  [36.6 °C (97.8 °F)-37.7 °C (99.8 °F)] 36.8 °C (98.2 °F)  Pulse:  [] 97  Resp:  [18] 18  BP: (118-133)/(77-91) 118/81  SpO2:  [92 %-96 %] 95 %    Physical Exam  Vitals signs and nursing note reviewed.   HENT:      Head: Normocephalic.      Nose: Nose normal.      Mouth/Throat:      Mouth: Mucous membranes are moist.      Pharynx: Oropharynx is clear.   Eyes:      General: No scleral icterus.     Pupils: Pupils are equal, round, and reactive to light.   Neck:      Musculoskeletal: Normal range of motion.   Cardiovascular:      Rate and Rhythm: Normal rate and regular rhythm.      Pulses: Normal pulses.      Heart sounds: Normal heart sounds.   Pulmonary:      Effort: Pulmonary effort is normal.      Breath sounds: No wheezing or rhonchi.   Abdominal:      General: Bowel sounds are normal.      Palpations: Abdomen is soft.      Tenderness: There is abdominal tenderness. There is guarding.      Comments: S/p sleeve gastrectomy, splenectomy and partial LEFT diaphragm resection   Musculoskeletal: Normal range of motion.         General: Tenderness present.   Skin:     General: Skin is warm.      Capillary Refill: Capillary refill takes 2 to 3 seconds.   Neurological:      Mental Status: He is alert. Mental status is at baseline.         Fluids    Intake/Output Summary (Last 24 hours) at 6/17/2020 1123  Last data filed at 6/17/2020 0800  Gross per 24 hour   Intake 1418.7 ml   Output 865 ml   Net 553.7 ml       Laboratory  Recent Labs     06/15/20  0537 06/16/20  0502 06/17/20  0541   WBC 21.7* 19.0* 16.4*    RBC 3.68* 3.34* 3.29*   HEMOGLOBIN 10.5* 9.7* 9.4*   HEMATOCRIT 34.3* 30.8* 30.3*   MCV 93.2 92.2 92.1   MCH 28.5 29.0 28.6   MCHC 30.6* 31.5* 31.0*   RDW 50.9* 51.6* 50.9*   PLATELETCT 627* 571* 632*   MPV 8.8* 8.5* 8.7*     Recent Labs     06/15/20  0537 06/16/20  0502 06/17/20  0541   SODIUM 138 137 137   POTASSIUM 4.0 3.9 3.8   CHLORIDE 101 101 100   CO2 31 30 31   GLUCOSE 98 93 114*   BUN 10 9 7*   CREATININE 0.87 0.70 0.60   CALCIUM 8.7 8.2* 8.1*                   Imaging  DX-UPPER GI-SERIES WITH KUB   Final Result      Status post sleeve gastrectomy. No evidence of anastomotic leak.      DX-CHEST-LIMITED (1 VIEW)   Final Result         1.  Pulmonary infiltrate and layering pleural effusion, new since prior study. Thoracostomy tube remains in place      DX-CHEST-LIMITED (1 VIEW)   Final Result      Trace left pneumothorax. Left-sided chest tubes.      Blunting of the right costophrenic angle may be related to a trace pleural effusion.      Mild bibasilar atelectasis.         CT-ABDOMEN-PELVIS WITH   Final Result         1.  Large heterogeneous masslike hypodensity within the spleen which contains air and nonspecific speckled areas of increased density. This is contiguous with the greater curvature of the stomach with associated gastric wall thickening and with air    likely tracking from the stomach into this splenic abnormality. Findings are most concerning for underlying malignancy with air in the splenic mass raising concern for possible superinfection. Endoscopic evaluation and biopsy should be considered.   2.  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 Additional shoddy and mildly prominent retroperitoneal lymph nodes.      These findings were discussed with JACQUELIN POWERS on 6/11/2020 6:30 PM.                  DX-CHEST-PORTABLE (1 VIEW)   Final Result      1.  There is no acute cardiopulmonary process.           Assessment/Plan  Gastric mass- (present on  admission)  Assessment & Plan  -Seen on CT and on exam with EGD.  -General surgery Dr. Moura is following surgery done on 6/13  -Sleeve gastrectomy, splenectomy and partial LEFT diaphragm resection  - results of biopsy from EGD showing large B cell Lymphoma Oncology, Dr. James consulted appreciate rec.     GI bleed- (present on admission)  Assessment & Plan  -HIGH concern on admission due to hemoglobin drop  -came in with dark stool with blood in it and then red vomitus  -Monitor for bleeding  -ERP did discuss the case with GI who will follow along - EGD done  -Closely monitor for signs of hemodynamic instability  -EGD: Ulcer with adherent clot as noted above.      S/P splenectomy  Assessment & Plan  -6/13- sleeve gastrectomy, splenectomy and partial LEFT diaphragm resection    Sepsis (HCC)- (present on admission)  Assessment & Plan  -This is Sepsis Present on admission  SIRS criteria identified on my evaluation include: Tachycardia, with heart rate greater than 90 BPM and Leukocytosis, with WBC greater than 12,000  -Source is intra-abdominal  -Sepsis protocol initiated  -Fluid resuscitation ordered per protocol  -IV antibiotics as appropriate for source of sepsis  -While organ dysfunction may be noted elsewhere in this problem list or in the chart, degree of organ dysfunction does not meet CMS -criteria for severe sepsis  -Start ceftriaxone and metronidazole  -Await culture results  -CT abdomen/pelvis noted a large mass associated with the spleen and the stomach  -Radiology were also concerned about a possible superinfection  -Lactic acid has remained within normal range.  -Patient is at risk of worsening, does require close monitoring of his hemodynamics  -EGD confirms likely gastric mass with perforation as source of infection    Acute blood loss anemia- (present on admission)  Assessment & Plan  -Likely from GI bleed complicated by gastric mass  -Patient is hemodynamically stable and asymptomatic  -Will continue  to trend with CBC  -Transfuse to maintain hemoglobin greater than 7.  -Status post 1 unit of packed blood cells on 6/12/2020 and 6/13/2021  Results from last 7 days   Lab Units 06/13/20  1450 06/13/20  0331 06/12/20  1923 06/12/20  1127  06/12/20  0216 06/11/20  1553   HGB 1503 g/dL 10.4* 6.9* 7.7* 7.3*   < > 5.9* 8.0*   HCT 1504 % 31.9* 22.6*  --   --   --  18.9* 25.0*   MCV 1505 fL 89.1 91.1  --   --   --  87.9 85.3    < > = values in this interval not displayed.     Folate -Folic Acid   Date Value Ref Range Status   06/12/2020 11.0 >4.0 ng/mL Final     Vitamin B12 -True Cobalamin   Date Value Ref Range Status   06/12/2020 265 211 - 911 pg/mL Final     Ferritin   Date Value Ref Range Status   06/12/2020 546.0 (H) 22.0 - 322.0 ng/mL Final     Iron   Date Value Ref Range Status   06/12/2020 31 (L) 50 - 180 ug/dL Final     Total Iron Binding   Date Value Ref Range Status   06/12/2020 162 (L) 250 - 450 ug/dL Final     % Saturation   Date Value Ref Range Status   06/12/2020 19 15 - 55 % Final         Hyperglycemia- (present on admission)  Assessment & Plan  -Mild, no need for coverage    Hyponatremia- (present on admission)  Assessment & Plan  -Improving  -Likely due to dehydration  -Start IV fluids  -Repeat BMP in the morning - continue to monitor    Thrombocytosis (HCC)- (present on admission)  Assessment & Plan  -Likely due to anemia, possibly dehydration  -Repeat CBC in the morning       VTE prophylaxis: SCDs

## 2020-06-17 NOTE — CARE PLAN
Problem: Fluid Volume:  Goal: Will maintain balanced intake and output  Outcome: PROGRESSING AS EXPECTED     Problem: Communication  Goal: The ability to communicate needs accurately and effectively will improve  Outcome: PROGRESSING AS EXPECTED     Problem: Safety  Goal: Will remain free from injury  Outcome: PROGRESSING AS EXPECTED     Problem: Bowel/Gastric:  Goal: Normal bowel function is maintained or improved  Outcome: PROGRESSING AS EXPECTED

## 2020-06-17 NOTE — CARE PLAN
Problem: Communication  Goal: The ability to communicate needs accurately and effectively will improve  Outcome: PROGRESSING AS EXPECTED     Problem: Safety  Goal: Will remain free from injury  Outcome: PROGRESSING AS EXPECTED     Problem: Pain Management  Goal: Pain level will decrease to patient's comfort goal  Outcome: PROGRESSING AS EXPECTED     Problem: Respiratory:  Goal: Respiratory status will improve  Outcome: PROGRESSING AS EXPECTED

## 2020-06-17 NOTE — PROGRESS NOTES
"    DATE: 6/17/2020    Post Operative Day  4 sleeve gastrectomy, splenectomy and partial resection of diaphragm...    Interval Events:    No pain issues  Tired  UGI - no leak/ leave NG and start sips  L CT draining poorly - stripped with evacuation of fluid - place back on suction  Follow CXR.  Serum iron low - replalce    PHYSICAL EXAMINATION:  Vital Signs: /78   Pulse 87   Temp 36.7 °C (98.1 °F) (Temporal)   Resp 18   Ht 1.829 m (6' 0.01\")   Wt 102.9 kg (226 lb 13.7 oz)   SpO2 94%     Soft , ND  Wounds clean  Drain stripped.    Laboratory Values:   Recent Labs     06/15/20  0537 06/16/20  0502 06/17/20  0541   WBC 21.7* 19.0* 16.4*   RBC 3.68* 3.34* 3.29*   HEMOGLOBIN 10.5* 9.7* 9.4*   HEMATOCRIT 34.3* 30.8* 30.3*   MCV 93.2 92.2 92.1   MCH 28.5 29.0 28.6   MCHC 30.6* 31.5* 31.0*   RDW 50.9* 51.6* 50.9*   PLATELETCT 627* 571* 632*   MPV 8.8* 8.5* 8.7*     Recent Labs     06/15/20  0537 06/16/20  0502 06/17/20  0541   SODIUM 138 137 137   POTASSIUM 4.0 3.9 3.8   CHLORIDE 101 101 100   CO2 31 30 31   GLUCOSE 98 93 114*   BUN 10 9 7*   CREATININE 0.87 0.70 0.60   CALCIUM 8.7 8.2* 8.1*     Recent Labs     06/15/20  0537 06/17/20  0541   ASTSGOT 13 11*   ALTSGPT 11 8   TBILIRUBIN 0.2 0.5   ALKPHOSPHAT 61 62   GLOBULIN 2.8 2.8            Imaging:   DX-UPPER GI-SERIES WITH KUB   Final Result      Status post sleeve gastrectomy. No evidence of anastomotic leak.      DX-CHEST-LIMITED (1 VIEW)   Final Result         1.  Pulmonary infiltrate and layering pleural effusion, new since prior study. Thoracostomy tube remains in place      DX-CHEST-LIMITED (1 VIEW)   Final Result      Trace left pneumothorax. Left-sided chest tubes.      Blunting of the right costophrenic angle may be related to a trace pleural effusion.      Mild bibasilar atelectasis.         CT-ABDOMEN-PELVIS WITH   Final Result         1.  Large heterogeneous masslike hypodensity within the spleen which contains air and nonspecific speckled areas " of increased density. This is contiguous with the greater curvature of the stomach with associated gastric wall thickening and with air    likely tracking from the stomach into this splenic abnormality. Findings are most concerning for underlying malignancy with air in the splenic mass raising concern for possible superinfection. Endoscopic evaluation and biopsy should be considered.   2.  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 Additional shoddy and mildly prominent retroperitoneal lymph nodes.      These findings were discussed with JACQUELIN POWERS on 6/11/2020 6:30 PM.                  DX-CHEST-PORTABLE (1 VIEW)   Final Result      1.  There is no acute cardiopulmonary process.      US-EXTREMITY VENOUS UPPER UNILAT LEFT    (Results Pending)       ASSESSMENT AND PLAN:     Gastric mass- (present on admission)  Assessment & Plan  6/14 resection  Path shows: High grade B-cell lymphoma involving the stomach and spleen.          Multiple lymph nodes around the stomach and splenic hilum are           negative for malignancy. Tumor is not seen at the gastric resection margin.          The posterior soft tissue margin of the spleen is widely positive for tumor.            B. Portion of left diaphragm:          High grade B-cell lymphoma involving the paradiaphragmatic soft           tissue.     GI bleed- (present on admission)  Assessment & Plan  Large gastric mass invading into spleen  6/12 - EGD  6/13 - Sleeve gastrectomy, splenectomy, Resection of L hemidiaphragm (partial)  NGT to suction  On zosyn    S/P splenectomy  Assessment & Plan  6/13 - Splenectomy due to involvement  Needs Post splenectomy vaccinations    Acute blood loss anemia- (present on admission)  Assessment & Plan  6/17 Iron replcement         ____________________________________     Dax Lua M.D.

## 2020-06-17 NOTE — PROGRESS NOTES
Patient A&Ox4, VSS on 3LNC.  NGT clamped per order.  Denies nausea.  CLD initiated.  Prevena to midline.  LLQ SMOOTH with minimal serous drainage.  L chest tube to water seal, serous drainage noted in tubing.  Morphine PCA.  Patient reports he hasnt been using it much.  Reports pain as tolerable at this time.  Ambulating with SBA.  Voiding without difficulty.  Last BM PTA, not passing flatus.  Call light within reach, hourly rounding in place.  Bed alarm set per medium fall risk protocol.      1730-Chest tube connected back to -20cm wall suction per order.

## 2020-06-17 NOTE — PROGRESS NOTES
"    DATE: 6/16/2020    Post Operative Day  3  sleeve gastrectomy, splenectomy and partial resection of diaphragm..    Interval Events:  Tired  Good pain control.  Slept poorly last night   CT - minimal out - no air leak  Drain - serous  NG minimal out    PHYSICAL EXAMINATION:  Vital Signs: /85   Pulse 87   Temp 37.2 °C (98.9 °F) (Temporal)   Resp 18   Ht 1.829 m (6' 0.01\")   Wt 102.9 kg (226 lb 13.7 oz)   SpO2 95%     L CT to suction. No air leak / minimal out  Abd Soft, Provena in place. SMOOTH serous.    Laboratory Values:   Recent Labs     06/14/20  0719 06/15/20  0537 06/16/20  0502   WBC 17.8* 21.7* 19.0*   RBC 3.74* 3.68* 3.34*   HEMOGLOBIN 10.8* 10.5* 9.7*   HEMATOCRIT 33.7* 34.3* 30.8*   MCV 90.1 93.2 92.2   MCH 28.9 28.5 29.0   MCHC 32.0* 30.6* 31.5*   RDW 48.1 50.9* 51.6*   PLATELETCT 575* 627* 571*   MPV 8.5* 8.8* 8.5*     Recent Labs     06/14/20  0616 06/15/20  0537 06/16/20  0502   SODIUM 135 138 137   POTASSIUM 4.9 4.0 3.9   CHLORIDE 102 101 101   CO2 25 31 30   GLUCOSE 145* 98 93   BUN 11 10 9   CREATININE 0.88 0.87 0.70   CALCIUM 8.3* 8.7 8.2*     Recent Labs     06/14/20  0616 06/15/20  0537   ASTSGOT 22 13   ALTSGPT 14 11   TBILIRUBIN 0.3 0.2   ALKPHOSPHAT 57 61   GLOBULIN 2.6 2.8            Imaging:   DX-CHEST-LIMITED (1 VIEW)   Final Result      Trace left pneumothorax. Left-sided chest tubes.      Blunting of the right costophrenic angle may be related to a trace pleural effusion.      Mild bibasilar atelectasis.         CT-ABDOMEN-PELVIS WITH   Final Result         1.  Large heterogeneous masslike hypodensity within the spleen which contains air and nonspecific speckled areas of increased density. This is contiguous with the greater curvature of the stomach with associated gastric wall thickening and with air    likely tracking from the stomach into this splenic abnormality. Findings are most concerning for underlying malignancy with air in the splenic mass raising concern for possible " superinfection. Endoscopic evaluation and biopsy should be considered.   2.  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 Additional shoddy and mildly prominent retroperitoneal lymph nodes.      These findings were discussed with JACQUELIN POWERS on 6/11/2020 6:30 PM.                  DX-CHEST-PORTABLE (1 VIEW)   Final Result      1.  There is no acute cardiopulmonary process.          ASSESSMENT AND PLAN:     GI bleed- (present on admission)  Assessment & Plan  Large gastric mass invading into spleen  6/12 - EGD  6/13 - Sleeve gastrectomy, splenectomy, Resection of L hemidiaphragm (partial)  NGT to suction  On zosyn    S/P splenectomy  Assessment & Plan  6/13 - Splenectomy due to involvement  Needs Post splenectomy vaccinations    UGI tomorrow  CT to water seal  Continue NPO       ____________________________________     Dax Lua M.D.

## 2020-06-17 NOTE — PROGRESS NOTES
Report received   Assumed care of patient at 1915  Pt is A&O x 4  Pain reported at 1/10 - PCA pump  Denies N/V/D, Denies chest pain, Denies dizziness, denies SOB  NPO - NG tube to right nare- intact. Small amount of brown output to LCS  Midline Prevena - CDI  LLQ SMOOTH Drain - CDI  Adequate Urine output  No BM - last pta  IS at 1400 - no assistance required - encouragement from RT    Up with one assist and walker  SCD's in place  Bed in lowest position and locked.  Pt resting comfortably now.  Review plan of care with patient  Call light within reach  Hourly rounds in place  All needs met at this time

## 2020-06-17 NOTE — DIETARY
Nutrition services: Day 6 of admit.  Napoleon Zendejas is a 65 y.o. male with admitting DX of Sepsis.    RD following pt for diet advancement. Pt has been NPO/Clear liquid x 5 days.   POD#4 of exploratory celiotomy, sleeve gastrectomy, splenectomy and partial resection of the left hemidiaphragm.   UGI today.  NGT clamped.  Clear liquid diet started today.  No BM, no flatus.    Recommendations/Plan:  1. Advance diet as tolerated per MD.  2. Oral nutrition supplements as needed.    RD will continue to follow.

## 2020-06-18 ENCOUNTER — APPOINTMENT (OUTPATIENT)
Dept: RADIOLOGY | Facility: MEDICAL CENTER | Age: 65
DRG: 853 | End: 2020-06-18
Attending: INTERNAL MEDICINE
Payer: COMMERCIAL

## 2020-06-18 ENCOUNTER — APPOINTMENT (OUTPATIENT)
Dept: RADIOLOGY | Facility: MEDICAL CENTER | Age: 65
DRG: 853 | End: 2020-06-18
Attending: SURGERY
Payer: COMMERCIAL

## 2020-06-18 PROBLEM — J90 PLEURAL EFFUSION ON LEFT: Status: ACTIVE | Noted: 2020-06-18

## 2020-06-18 LAB
BASOPHILS # BLD AUTO: 0.6 % (ref 0–1.8)
BASOPHILS # BLD: 0.1 K/UL (ref 0–0.12)
EOSINOPHIL # BLD AUTO: 0.48 K/UL (ref 0–0.51)
EOSINOPHIL NFR BLD: 3 % (ref 0–6.9)
ERYTHROCYTE [DISTWIDTH] IN BLOOD BY AUTOMATED COUNT: 51.8 FL (ref 35.9–50)
HCT VFR BLD AUTO: 30.2 % (ref 42–52)
HGB BLD-MCNC: 9.4 G/DL (ref 14–18)
IMM GRANULOCYTES # BLD AUTO: 0.25 K/UL (ref 0–0.11)
IMM GRANULOCYTES NFR BLD AUTO: 1.6 % (ref 0–0.9)
LYMPHOCYTES # BLD AUTO: 1.67 K/UL (ref 1–4.8)
LYMPHOCYTES NFR BLD: 10.4 % (ref 22–41)
MCH RBC QN AUTO: 29.1 PG (ref 27–33)
MCHC RBC AUTO-ENTMCNC: 31.1 G/DL (ref 33.7–35.3)
MCV RBC AUTO: 93.5 FL (ref 81.4–97.8)
MONOCYTES # BLD AUTO: 1.34 K/UL (ref 0–0.85)
MONOCYTES NFR BLD AUTO: 8.4 % (ref 0–13.4)
NEUTROPHILS # BLD AUTO: 12.17 K/UL (ref 1.82–7.42)
NEUTROPHILS NFR BLD: 76 % (ref 44–72)
NRBC # BLD AUTO: 0 K/UL
NRBC BLD-RTO: 0 /100 WBC
PLATELET # BLD AUTO: 684 K/UL (ref 164–446)
PMV BLD AUTO: 8.4 FL (ref 9–12.9)
RBC # BLD AUTO: 3.23 M/UL (ref 4.7–6.1)
WBC # BLD AUTO: 16 K/UL (ref 4.8–10.8)

## 2020-06-18 PROCEDURE — 700111 HCHG RX REV CODE 636 W/ 250 OVERRIDE (IP): Performed by: INTERNAL MEDICINE

## 2020-06-18 PROCEDURE — 94669 MECHANICAL CHEST WALL OSCILL: CPT

## 2020-06-18 PROCEDURE — 700111 HCHG RX REV CODE 636 W/ 250 OVERRIDE (IP): Performed by: SURGERY

## 2020-06-18 PROCEDURE — A9270 NON-COVERED ITEM OR SERVICE: HCPCS | Performed by: INTERNAL MEDICINE

## 2020-06-18 PROCEDURE — 700102 HCHG RX REV CODE 250 W/ 637 OVERRIDE(OP): Performed by: INTERNAL MEDICINE

## 2020-06-18 PROCEDURE — 700111 HCHG RX REV CODE 636 W/ 250 OVERRIDE (IP): Performed by: HOSPITALIST

## 2020-06-18 PROCEDURE — 700101 HCHG RX REV CODE 250: Performed by: INTERNAL MEDICINE

## 2020-06-18 PROCEDURE — 770006 HCHG ROOM/CARE - MED/SURG/GYN SEMI*

## 2020-06-18 PROCEDURE — C9113 INJ PANTOPRAZOLE SODIUM, VIA: HCPCS | Performed by: SURGERY

## 2020-06-18 PROCEDURE — 700105 HCHG RX REV CODE 258: Performed by: INTERNAL MEDICINE

## 2020-06-18 PROCEDURE — 71045 X-RAY EXAM CHEST 1 VIEW: CPT

## 2020-06-18 PROCEDURE — 36415 COLL VENOUS BLD VENIPUNCTURE: CPT

## 2020-06-18 PROCEDURE — 700105 HCHG RX REV CODE 258: Performed by: SURGERY

## 2020-06-18 PROCEDURE — 99232 SBSQ HOSP IP/OBS MODERATE 35: CPT | Performed by: INTERNAL MEDICINE

## 2020-06-18 PROCEDURE — 93971 EXTREMITY STUDY: CPT | Mod: LT

## 2020-06-18 PROCEDURE — 85025 COMPLETE CBC W/AUTO DIFF WBC: CPT

## 2020-06-18 PROCEDURE — 94760 N-INVAS EAR/PLS OXIMETRY 1: CPT

## 2020-06-18 RX ADMIN — FLUCONAZOLE, SODIUM CHLORIDE 200 MG: 2 INJECTION INTRAVENOUS at 06:00

## 2020-06-18 RX ADMIN — PANTOPRAZOLE SODIUM 40 MG: 40 INJECTION, POWDER, LYOPHILIZED, FOR SOLUTION INTRAVENOUS at 05:35

## 2020-06-18 RX ADMIN — ONDANSETRON 4 MG: 2 INJECTION INTRAMUSCULAR; INTRAVENOUS at 05:34

## 2020-06-18 RX ADMIN — SODIUM CHLORIDE, SODIUM LACTATE, POTASSIUM CHLORIDE, CALCIUM CHLORIDE AND DEXTROSE MONOHYDRATE: 5; 600; 310; 30; 20 INJECTION, SOLUTION INTRAVENOUS at 21:11

## 2020-06-18 RX ADMIN — SODIUM CHLORIDE, SODIUM LACTATE, POTASSIUM CHLORIDE, CALCIUM CHLORIDE AND DEXTROSE MONOHYDRATE: 5; 600; 310; 30; 20 INJECTION, SOLUTION INTRAVENOUS at 06:30

## 2020-06-18 RX ADMIN — PANTOPRAZOLE SODIUM 40 MG: 40 INJECTION, POWDER, LYOPHILIZED, FOR SOLUTION INTRAVENOUS at 17:01

## 2020-06-18 RX ADMIN — METRONIDAZOLE 500 MG: 500 INJECTION, SOLUTION INTRAVENOUS at 05:31

## 2020-06-18 RX ADMIN — ONDANSETRON 4 MG: 2 INJECTION INTRAMUSCULAR; INTRAVENOUS at 17:01

## 2020-06-18 RX ADMIN — Medication: at 07:16

## 2020-06-18 RX ADMIN — SODIUM CHLORIDE 125 MG: 9 INJECTION, SOLUTION INTRAVENOUS at 23:04

## 2020-06-18 RX ADMIN — CEFTRIAXONE SODIUM 2 G: 2 INJECTION, POWDER, FOR SOLUTION INTRAMUSCULAR; INTRAVENOUS at 17:43

## 2020-06-18 RX ADMIN — DOCUSATE SODIUM 50 MG AND SENNOSIDES 8.6 MG 2 TABLET: 8.6; 5 TABLET, FILM COATED ORAL at 17:01

## 2020-06-18 RX ADMIN — METRONIDAZOLE 500 MG: 500 INJECTION, SOLUTION INTRAVENOUS at 14:11

## 2020-06-18 RX ADMIN — ONDANSETRON 4 MG: 2 INJECTION INTRAMUSCULAR; INTRAVENOUS at 21:11

## 2020-06-18 ASSESSMENT — ENCOUNTER SYMPTOMS
DIZZINESS: 0
PSYCHIATRIC NEGATIVE: 1
COUGH: 0
CLAUDICATION: 0
EYES NEGATIVE: 1
ORTHOPNEA: 0
NERVOUS/ANXIOUS: 1
HEMOPTYSIS: 0
WEAKNESS: 1
ABDOMINAL PAIN: 1
BACK PAIN: 1
MYALGIAS: 0
CHILLS: 0
HEARTBURN: 0
WEIGHT LOSS: 1
DIARRHEA: 0
BLOOD IN STOOL: 1
PALPITATIONS: 0
DEPRESSION: 0
TINGLING: 0
MYALGIAS: 1
SINUS PAIN: 0
SPUTUM PRODUCTION: 0
FEVER: 0
HEADACHES: 0
SORE THROAT: 0

## 2020-06-18 NOTE — PROGRESS NOTES
[facility] Medicine Daily Progress Note    Date of Service  6/18/2020    Chief Complaint  65 y.o. male admitted 6/11/2020 with Bloody stools    Hospital Course    Mr. Zendejas is a 65 y.o. male has a PMHx of HTN, perforated gastric cancer, who presented on 6/11/2020 with dark stools.  Morning of admission the patient presented to his primary care provider and was told that he had an abnormal CBC.  Stool sample was positive for blood.  Patient does report fatigue and dark bloody stools.  He then developed lightheadedness and an episode of hematemesis.  He does report vague abdominal pain, early satiety, and decreased appetite for weeks. Denies heavy alcohol and no blood thinners.  This led to his presentation to the emergency room.  Patient was admitted with sepsis secondary to abdominal infection.  Started on ceftriaxone and metronidazole.  CT abdomen was also concerning for gastric mass.  GI was consulted Dr. Ray.  The patient was started on a pantoprazole drip.  EGD demonstrated a large cratered stomach ulcer in the fundus/cardia area status post cold forceps biopsy.  There was also another cratered ulcer with perforation from tumor growth surgery.  General surgery Dr. Moura was consulted. Patient admitted for further evaluation.         Interval Problem Update  Pt seen and examined, resting in bed, has some abdominal pain, Surgey following,   Appreciate rec.   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  Left arm swelling will check an upper extremity doppler to r/o DVT     Consultants/Specialty  -General Surgery - Dr. Moura  -Gastroenterology - Dr. Ray  -Oncology-      Code Status  FULL    Disposition  TBD    Review of Systems  Review of Systems   Constitutional: Positive for malaise/fatigue and weight loss. Negative for chills and fever.   HENT: Negative for congestion, ear pain and sinus pain.    Eyes: Negative.    Respiratory: Negative for cough and sputum  production.    Cardiovascular: Negative for chest pain, palpitations and orthopnea.   Gastrointestinal: Positive for abdominal pain, blood in stool and melena.   Genitourinary: Negative.    Musculoskeletal: Positive for myalgias.   Skin: Negative.    Neurological: Positive for weakness.   Psychiatric/Behavioral: Negative.    All other systems reviewed and are negative.       Physical Exam  Temp:  [36.7 °C (98.1 °F)-37.3 °C (99.2 °F)] 36.7 °C (98.1 °F)  Pulse:  [87-99] 99  Resp:  [16-20] 18  BP: (120-146)/(75-92) 136/75  SpO2:  [92 %-96 %] 92 %    Physical Exam  Vitals signs and nursing note reviewed.   HENT:      Head: Normocephalic.      Nose: Nose normal.      Mouth/Throat:      Mouth: Mucous membranes are moist.      Pharynx: Oropharynx is clear.   Eyes:      General: No scleral icterus.     Pupils: Pupils are equal, round, and reactive to light.   Neck:      Musculoskeletal: Normal range of motion.   Cardiovascular:      Rate and Rhythm: Normal rate and regular rhythm.      Pulses: Normal pulses.      Heart sounds: Normal heart sounds.   Pulmonary:      Effort: Pulmonary effort is normal.      Breath sounds: No wheezing or rhonchi.   Abdominal:      General: Bowel sounds are normal.      Palpations: Abdomen is soft.      Tenderness: There is abdominal tenderness. There is guarding.      Comments: S/p sleeve gastrectomy, splenectomy and partial LEFT diaphragm resection   Musculoskeletal: Normal range of motion.         General: Tenderness present.   Skin:     General: Skin is warm.      Capillary Refill: Capillary refill takes 2 to 3 seconds.   Neurological:      Mental Status: He is alert. Mental status is at baseline.         Fluids    Intake/Output Summary (Last 24 hours) at 6/18/2020 1118  Last data filed at 6/18/2020 0716  Gross per 24 hour   Intake 1253.02 ml   Output 895 ml   Net 358.02 ml       Laboratory  Recent Labs     06/16/20  0502 06/17/20  0541 06/18/20  0449   WBC 19.0* 16.4* 16.0*   RBC 3.34*  3.29* 3.23*   HEMOGLOBIN 9.7* 9.4* 9.4*   HEMATOCRIT 30.8* 30.3* 30.2*   MCV 92.2 92.1 93.5   MCH 29.0 28.6 29.1   MCHC 31.5* 31.0* 31.1*   RDW 51.6* 50.9* 51.8*   PLATELETCT 571* 632* 684*   MPV 8.5* 8.7* 8.4*     Recent Labs     06/16/20  0502 06/17/20  0541   SODIUM 137 137   POTASSIUM 3.9 3.8   CHLORIDE 101 100   CO2 30 31   GLUCOSE 93 114*   BUN 9 7*   CREATININE 0.70 0.60   CALCIUM 8.2* 8.1*                   Imaging  DX-CHEST-PORTABLE (1 VIEW)   Final Result         1.  Pulmonary infiltrate and layering pleural effusion, stable since prior study. Thoracostomy tube remains in place      DX-UPPER GI-SERIES WITH KUB   Final Result      Status post sleeve gastrectomy. No evidence of anastomotic leak.      DX-CHEST-LIMITED (1 VIEW)   Final Result         1.  Pulmonary infiltrate and layering pleural effusion, new since prior study. Thoracostomy tube remains in place      DX-CHEST-LIMITED (1 VIEW)   Final Result      Trace left pneumothorax. Left-sided chest tubes.      Blunting of the right costophrenic angle may be related to a trace pleural effusion.      Mild bibasilar atelectasis.         CT-ABDOMEN-PELVIS WITH   Final Result         1.  Large heterogeneous masslike hypodensity within the spleen which contains air and nonspecific speckled areas of increased density. This is contiguous with the greater curvature of the stomach with associated gastric wall thickening and with air    likely tracking from the stomach into this splenic abnormality. Findings are most concerning for underlying malignancy with air in the splenic mass raising concern for possible superinfection. Endoscopic evaluation and biopsy should be considered.   2.  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 Additional shoddy and mildly prominent retroperitoneal lymph nodes.      These findings were discussed with JACQUELIN POWERS on 6/11/2020 6:30 PM.                  DX-CHEST-PORTABLE (1 VIEW)   Final Result       1.  There is no acute cardiopulmonary process.      US-EXTREMITY VENOUS UPPER UNILAT LEFT    (Results Pending)        Assessment/Plan  Gastric mass- (present on admission)  Assessment & Plan  -Seen on CT and on exam with EGD.  -General surgery Dr. Moura is following surgery done on 6/13  -Sleeve gastrectomy, splenectomy and partial LEFT diaphragm resection  - results of biopsy from EGD showing large B cell Lymphoma Oncology, Dr. James consulted appreciate rec.     GI bleed- (present on admission)  Assessment & Plan  -HIGH concern on admission due to hemoglobin drop  -came in with dark stool with blood in it and then red vomitus  -Monitor for bleeding  -ERP did discuss the case with GI who will follow along - EGD done  -Closely monitor for signs of hemodynamic instability  -EGD: Ulcer with adherent clot as noted above.      S/P splenectomy  Assessment & Plan  -6/13- sleeve gastrectomy, splenectomy and partial LEFT diaphragm resection    Sepsis (HCC)- (present on admission)  Assessment & Plan  -This is Sepsis Present on admission  SIRS criteria identified on my evaluation include: Tachycardia, with heart rate greater than 90 BPM and Leukocytosis, with WBC greater than 12,000  -Source is intra-abdominal  -Sepsis protocol initiated  -Fluid resuscitation ordered per protocol  -IV antibiotics as appropriate for source of sepsis  -While organ dysfunction may be noted elsewhere in this problem list or in the chart, degree of organ dysfunction does not meet CMS -criteria for severe sepsis  -Start ceftriaxone and metronidazole  -Await culture results  -CT abdomen/pelvis noted a large mass associated with the spleen and the stomach  -Radiology were also concerned about a possible superinfection  -Lactic acid has remained within normal range.  -Patient is at risk of worsening, does require close monitoring of his hemodynamics  -EGD confirms likely gastric mass with perforation as source of infection    Acute blood loss  anemia- (present on admission)  Assessment & Plan  -Likely from GI bleed complicated by gastric mass  -Patient is hemodynamically stable and asymptomatic  -Will continue to trend with CBC  -Transfuse to maintain hemoglobin greater than 7.  -Status post 1 unit of packed blood cells on 6/12/2020 and 6/13/2021  Results from last 7 days   Lab Units 06/13/20  1450 06/13/20  0331 06/12/20  1923 06/12/20  1127  06/12/20  0216 06/11/20  1553   HGB 1503 g/dL 10.4* 6.9* 7.7* 7.3*   < > 5.9* 8.0*   HCT 1504 % 31.9* 22.6*  --   --   --  18.9* 25.0*   MCV 1505 fL 89.1 91.1  --   --   --  87.9 85.3    < > = values in this interval not displayed.     Folate -Folic Acid   Date Value Ref Range Status   06/12/2020 11.0 >4.0 ng/mL Final     Vitamin B12 -True Cobalamin   Date Value Ref Range Status   06/12/2020 265 211 - 911 pg/mL Final     Ferritin   Date Value Ref Range Status   06/12/2020 546.0 (H) 22.0 - 322.0 ng/mL Final     Iron   Date Value Ref Range Status   06/12/2020 31 (L) 50 - 180 ug/dL Final     Total Iron Binding   Date Value Ref Range Status   06/12/2020 162 (L) 250 - 450 ug/dL Final     % Saturation   Date Value Ref Range Status   06/12/2020 19 15 - 55 % Final         Hyperglycemia- (present on admission)  Assessment & Plan  -Mild, no need for coverage    Hyponatremia- (present on admission)  Assessment & Plan  -Improving  -Likely due to dehydration  -Start IV fluids  -Repeat BMP in the morning - continue to monitor    Thrombocytosis (HCC)- (present on admission)  Assessment & Plan  -Likely due to anemia, possibly dehydration  -Repeat CBC in the morning       VTE prophylaxis: SCDs

## 2020-06-18 NOTE — RESPIRATORY CARE
Oxygen Rounds      Patient found on    O2 L/m:  3  Oxygen device:  snc  Spo2: 92%        Respiratory device skin site inspection completed.

## 2020-06-18 NOTE — ASSESSMENT & PLAN NOTE
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          Multiple lymph nodes around the stomach and splenic hilum are           negative for malignancy. Tumor is not seen at the gastric resection margin.          The posterior soft tissue margin of the spleen is widely positive for tumor.   B. Portion of left diaphragm:          High grade B-cell lymphoma involving the paradiaphragmatic soft           tissue.   6/20 - plan for gastric sleeve diet

## 2020-06-18 NOTE — PROGRESS NOTES
Report received   Assumed care of patient at 1915  Pt is A&O x 4  Pain reported at 2/10 - managed with PCA pump  Reports some manageable nausea after dinner   Denies chest pain, denies SOB, denies dizziness  NPO - NG tube to right nare- intact. Small amount of brown output. Clamped   Midline Prevena - CDI  LLQ SMOOTH Drain - CDI  Adequate Urine output  No BM - last pta    Up with 1 assist and walker  SCD's in place  Bed in lowest position and locked.  Pt resting comfortably now.  Review plan of care with patient  Call light within reach  Hourly rounds in place  All needs met at this time

## 2020-06-18 NOTE — ASSESSMENT & PLAN NOTE
6/13  Partial resection of left diaphragm  Left CT to suction  6/16 CT to H2O seal  6/17 Increased left pleural effusion / CT stripped and placed to suction  6/18 CT out 120 - serous / CXR with increased pleural effusion  6/19 still increasing effusion - plan for IR  6/20 IR for CT  6/21 VATS with chest tube x2

## 2020-06-18 NOTE — PROGRESS NOTES
Patient A&Ox4, VSS on 3LNC.  L chest tube to wall suction, minimal drainage.  L SMOOTH drain with serous drainage.  Midline prevena CDI.  Pain well controlled woth PCA.  L arm swollen, no pain or discoloration present.  Tolerating small amounts of clears, poor appetite.  Voiding w/o difficulty.  No flatus/BM.  Encouraged to ambulate in halls with staff, patient declining at this time.  Education provided.  Call light within reach, hourly rounding in place.

## 2020-06-18 NOTE — CONSULTS
DATE OF SERVICE:  06/17/2020    HISTORY OF PRESENT ILLNESS:  The patient is a very pleasant 65-year-old   gentleman with past medical history of hypertension who was seen by me as an   inpatient at [facility] at the request of Dr. Rock for the   above-mentioned problems.  Patient was admitted to [facility] on 06/11/2020 with blood-colored stools and blood-tinged vomitus.    There is no history of any weight loss, but the patient did complain of loss   of appetite prior to his hospitalization.  He was seen by Dr. Ray and   underwent a gastroscopy, which revealed a large cratered ulcer with an   adherent clot in the fundus, cardia area of the stomach.  Another cratered   ulcer with likely perforation from the tumor growth adjacent to it was also   seen.  Biopsies were consistent with diffuse large B cell lymphoma.  Dr. Montse Moura was consulted and the patient underwent a partial sleeve   gastrectomy, splenectomy, and partial resection of the diaphragm on   06/13/2020.  Path evaluation again of this area was consistent with a diffuse   large B cell lymphoma.  Heme/onc consultation was called for further   management of his condition.  Otherwise, the patient denies any fatigue or any   night sweats.  He denies any headaches.  He is still complaining of weakness.    His daughter and sister are at the bedside.    PAST MEDICAL HISTORY:  Hypertension, vitamin D deficiency.    PAST SURGICAL HISTORY:  Sleeve gastrectomy, splenectomy, and partial diaphragm   resection in 06/2020.    FAMILY HISTORY:  His paternal grandfather had leukemia.  Father had an unknown   type of leukemia.  Two siblings, both of whom are healthy.  Two children are   healthy.    REVIEW OF SYSTEMS:  Currently working as a .  Nonsmoker,   nondrinker.  .  Lives in the [place] area.  Lives alone.    REVIEW OF SYSTEMS:  GENERAL AND CONSTITUTIONAL:  Complaining of fatigue.  No recent weight loss,    no fevers.  HEAD, NECK, EARS, NOSE AND THROAT:  Denies any headaches or any visual   symptoms.  RESPIRATORY:  Denies any cough or any hemoptysis.  CARDIOVASCULAR:  No chest pain or palpitations.  GASTROINTESTINAL:  No nausea or vomiting.  See history of present illness.  No   dark-colored stools.  He is not passing gas yet.  NEUROLOGICAL:  Denies any seizures or stroke or any weakness.  PSYCHIATRIC:  He is anxious, but denies any depression.  HEMATOLOGICAL AND IMMUNOLOGICAL:  See history of present illness.  No   petechiae.  SKIN/INTEGUMENTARY:  No rash or any bruising.  Has a wound VAC in place.    Rest of his review of systems is negative per CMS/AMA criteria unless as   mentioned in history of present or past illness.    PHYSICAL EXAMINATION:  GENERAL:  The patient is alert and oriented x3.  VITAL SIGNS:  Temperature 36.8, pulse 90, respiration rate 16, /84.  HEENT:  Pupils are equal.  There is no icterus.  Conjunctivae are normal.    Oral mucosa reveals no mucositis.  Oropharynx is normal.  NECK:  Supple with no JVD or lymphadenopathy.  LUNGS:  Clear to auscultation.  HEART:  Reveals a I/VI systolic murmur.  Patient appears to be in sinus   rhythm.  PSYCHIATRIC:  Evaluation reveals anxiety, but no depression.  NEUROLOGIC:  Power is equal bilaterally.  There is no peripheral neuropathy.    Cranial nerves appear intact.  EXTREMITIES:  Reveal no edema in the lower extremities, but edema on the left   upper extremity.  ABDOMEN:  Surgical site has a wound VAC.  There is a drainage tube in place.    Otherwise, there is minimal tenderness.  Bowel sounds are sluggish.  BACK:  Reveals no kyphoscoliosis.    LABORATORY DATA:  Reviewed.    PATH:  Reviewed.    ASSESSMENT AND PLAN:  1.  Diffuse large B cell lymphoma.  Patient has been diagnosed with diffuse   large B cell lymphoma involving his stomach for which he underwent major   surgery.  Surgical margins were positive.  I reviewed his diagnosis with the   patient  and his family members in detail.  I explained to them that we will   need to stage his disease better and they will need a PET scan and a bone   marrow biopsy along with some labs.  Also, we would need to know if his tumor   is double-hit, triple-hit or double-expressor or not.  All these things will   determine his treatment and his prognosis and the type of chemotherapy that he   needs.  We will get an echocardiogram done on him as well.  Handouts on   diffuse large B cell lymphoma from up-to-date were given to them for review.  2.  Anemia.  Patient has anemia, most likely from his gastrointestinal blood   losses.  Status post splenectomy.  We will need to know if the patient   received the appropriate vaccinations with his surgery or not.  3.  Left upper extremity edema.  I have discussed the case with Dr. Rock who   will order an ultrasound of his left upper extremity.       ____________________________________     Tristianjvir MD WAN James / ERAN    DD:  06/17/2020 16:27:40  DT:  06/17/2020 20:14:26    D#:  [id number]  Job#:  [id number]

## 2020-06-18 NOTE — DISCHARGE PLANNING
Care Transition Team Assessment    This RN CM met with Pt at bedside for this assessment. Pt verified accuracy of the Face Sheet Demographics. Pt lives alone in a one story house in [place], [place]. Pt  Has a PCP, Dr Nacho Shea and has no AD. Pt's preferred pharmacy s [facility] over  at [facility]. Pt denies any use of DME prior to this hospitalization. Pt is independent with both ADLs and IADls prior to this hospitalization. Pt's emergency contact is his daughter, Felecia Zendejas and her contact number is Tel [phone].      Information Source  Orientation : Oriented x 4  Information Given By: Patient  Who is responsible for making decisions for patient? : Patient    Readmission Evaluation  Is this a readmission?: No    Elopement Risk  Legal Hold: No  Ambulatory or Self Mobile in Wheelchair: No-Not an Elopement Risk  Disoriented: No  Psychiatric Symptoms: None  History of Wandering: No  Elopement this Admit: No  Vocalizing Wanting to Leave: No  Displays Behaviors, Body Language Wanting to Leave: No-Not at Risk for Elopement  Elopement Risk: Not at Risk for Elopement    Interdisciplinary Discharge Planning  Primary Care Physician: Nacho Shea MD  Lives with - Patient's Self Care Capacity: Alone and Able to Care For Self  Patient or legal guardian wants to designate a caregiver (see row info): No  Support Systems: Adult Daughter  Housing / Facility: [address] [facility]  Do You Take your Prescribed Medications Regularly: Yes  Able to Return to Previous ADL's: Future Time w/Therapy  Mobility Issues: No  Prior Services: None  Patient Expects to be Discharged to: TBD  Assistance Needed: Unknown at this Time  Durable Medical Equipment: Not Applicable    Discharge Preparedness  What is your plan after discharge?: Home with help  What are your discharge supports?: Other (Daughter)  Prior Functional Level: Ambulatory, Independent with Activities of Daily Living, Independent with Medication Management  Difficulity with ADLs:  None  Difficulity with IADLs: None    Functional Assesment  Prior Functional Level: Ambulatory, Independent with Activities of Daily Living, Independent with Medication Management    Finances  Financial Barriers to Discharge: No  Prescription Coverage: Yes    Vision / Hearing Impairment  Vision Impairment : No  Hearing Impairment : No     Advance Directive  Advance Directive?: None    Domestic Abuse  Have you ever been the victim of abuse or violence?: No  Physical Abuse or Sexual Abuse: No  Verbal Abuse or Emotional Abuse: No  Possible Abuse Reported to: Not Applicable    Psychological Assessment  History of Substance Abuse: None  History of Psychiatric Problems: No  Non-compliant with Treatment: No  Newly Diagnosed Illness: Yes    Discharge Risks or Barriers  Discharge risks or barriers?: Other (Pending Medical Clearance)    Anticipated Discharge Information  Anticipated discharge disposition: Other (TBD)

## 2020-06-18 NOTE — PROGRESS NOTES
"    DATE: 6/18/2020    Post Operative Day  5 sleeve gastrectomy, splenectomy and partial resection of diaphragm....    Interval Events:    NG clamped x 24 hr without issue - DC NG tube  Left CT remains to suction - 120 cc out / on suction  CXR worse left pleural effusion  Continue clear liquids  Ongoing iron replacement    PHYSICAL EXAMINATION:  Vital Signs: /69   Pulse 85   Temp 36.8 °C (98.2 °F) (Temporal)   Resp 16   Ht 1.829 m (6' 0.01\")   Wt 102.9 kg (226 lb 13.7 oz)   SpO2 90%   Abdomen - soft / NT  Drain - serous - stripped  Left CT with serous drainage / stripped / no kinks/ remains on suction    Laboratory Values:   Recent Labs     06/16/20  0502 06/17/20  0541 06/18/20  0449   WBC 19.0* 16.4* 16.0*   RBC 3.34* 3.29* 3.23*   HEMOGLOBIN 9.7* 9.4* 9.4*   HEMATOCRIT 30.8* 30.3* 30.2*   MCV 92.2 92.1 93.5   MCH 29.0 28.6 29.1   MCHC 31.5* 31.0* 31.1*   RDW 51.6* 50.9* 51.8*   PLATELETCT 571* 632* 684*   MPV 8.5* 8.7* 8.4*     Recent Labs     06/16/20  0502 06/17/20  0541   SODIUM 137 137   POTASSIUM 3.9 3.8   CHLORIDE 101 100   CO2 30 31   GLUCOSE 93 114*   BUN 9 7*   CREATININE 0.70 0.60   CALCIUM 8.2* 8.1*     Recent Labs     06/17/20  0541   ASTSGOT 11*   ALTSGPT 8   TBILIRUBIN 0.5   ALKPHOSPHAT 62   GLOBULIN 2.8            Imaging:   US-EXTREMITY VENOUS UPPER UNILAT LEFT   Final Result      DX-CHEST-PORTABLE (1 VIEW)   Final Result         1.  Pulmonary infiltrate and layering pleural effusion, stable since prior study. Thoracostomy tube remains in place      DX-UPPER GI-SERIES WITH KUB   Final Result      Status post sleeve gastrectomy. No evidence of anastomotic leak.      DX-CHEST-LIMITED (1 VIEW)   Final Result         1.  Pulmonary infiltrate and layering pleural effusion, new since prior study. Thoracostomy tube remains in place      DX-CHEST-LIMITED (1 VIEW)   Final Result      Trace left pneumothorax. Left-sided chest tubes.      Blunting of the right costophrenic angle may be related to " a trace pleural effusion.      Mild bibasilar atelectasis.         CT-ABDOMEN-PELVIS WITH   Final Result         1.  Large heterogeneous masslike hypodensity within the spleen which contains air and nonspecific speckled areas of increased density. This is contiguous with the greater curvature of the stomach with associated gastric wall thickening and with air    likely tracking from the stomach into this splenic abnormality. Findings are most concerning for underlying malignancy with air in the splenic mass raising concern for possible superinfection. Endoscopic evaluation and biopsy should be considered.   2.  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 Additional shoddy and mildly prominent retroperitoneal lymph nodes.      These findings were discussed with JACQUELIN POWERS on 6/11/2020 6:30 PM.                  DX-CHEST-PORTABLE (1 VIEW)   Final Result      1.  There is no acute cardiopulmonary process.          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          Multiple lymph nodes around the stomach and splenic hilum are           negative for malignancy. Tumor is not seen at the gastric resection margin.          The posterior soft tissue margin of the spleen is widely positive for tumor.   B. Portion of left diaphragm:          High grade B-cell lymphoma involving the paradiaphragmatic soft           tissue.     Pleural effusion on left  Assessment & Plan  6/13  Partial resection of left diaphragm  Left CT to suction  6/16 CT to H2O seal  6/17 Increased left pleural effusion / CT stripped and placed to suction  6/18 CT out 120 - serous / CXR with increased pleural effusion    S/P splenectomy  Assessment & Plan  6/13 - Splenectomy due to involvement  Needs Post splenectomy vaccinations    Acute blood loss anemia- (present on admission)  Assessment &  Plan  6/17 Iron replcement    GI bleed- (present on admission)  Assessment & Plan  Large gastric mass invading into spleen  6/12 - EGD  6/13 - Sleeve gastrectomy, splenectomy, Resection of L hemidiaphragm (partial)  NGT to suction  On zosyn         ____________________________________     Dax Lua M.D.

## 2020-06-18 NOTE — PROGRESS NOTES
Oncology/Hematology Progress Note               Author: Galina James M.D. Date & Time created: 6/18/2020  1:46 PM   Diagnosis-diffuse large B-cell lymphoma  Interval History:  He is doing well.  Pain is generally well controlled.  He has reviewed information on diffuse large B-cell lymphoma and does not have any questions for me at this time    Review of Systems:  Review of Systems   Constitutional: Positive for malaise/fatigue. Negative for fever.   HENT: Negative for hearing loss and sore throat.    Respiratory: Negative for cough, hemoptysis and sputum production.    Cardiovascular: Negative for chest pain, palpitations, orthopnea and claudication.   Gastrointestinal: Positive for abdominal pain. Negative for diarrhea and heartburn.   Genitourinary: Negative for dysuria and hematuria.   Musculoskeletal: Positive for back pain and joint pain. Negative for myalgias.   Skin: Negative for itching and rash.   Neurological: Negative for dizziness, tingling and headaches.   Psychiatric/Behavioral: Negative for depression. The patient is nervous/anxious.        Physical Exam:  Physical Exam  Constitutional:       General: He is not in acute distress.     Appearance: Normal appearance. He is not ill-appearing.   HENT:      Mouth/Throat:      Mouth: Mucous membranes are moist.      Pharynx: No oropharyngeal exudate or posterior oropharyngeal erythema.   Pulmonary:      Effort: No respiratory distress.      Breath sounds: No stridor. No wheezing or rhonchi.   Abdominal:      General: Abdomen is flat.      Palpations: There is no mass.      Tenderness: There is no abdominal tenderness.   Skin:     Coloration: Skin is pale. Skin is not jaundiced.      Findings: No bruising or erythema.   Neurological:      Mental Status: He is alert.         Labs:          Recent Labs     06/16/20  0502 06/17/20  0541   SODIUM 137 137   POTASSIUM 3.9 3.8   CHLORIDE 101 100   CO2 30 31   BUN 9 7*   CREATININE 0.70 0.60   MAGNESIUM 2.1  --     PHOSPHORUS 2.8  --    CALCIUM 8.2* 8.1*     Recent Labs     20  0502 20  0541   ALTSGPT  --  8   ASTSGOT  --  11*   ALKPHOSPHAT  --  62   TBILIRUBIN  --  0.5   GLUCOSE 93 114*     Recent Labs     20  0502 20  0541 20  0449   RBC 3.34* 3.29* 3.23*   HEMOGLOBIN 9.7* 9.4* 9.4*   HEMATOCRIT 30.8* 30.3* 30.2*   PLATELETCT 571* 632* 684*   IRON  --  9*  --    TOTIRONBC  --  132*  --      Recent Labs     20  0502 20  0541 20  0449   WBC 19.0* 16.4* 16.0*   NEUTSPOLYS 85.20* 78.00* 76.00*   LYMPHOCYTES 7.40* 9.80* 10.40*   MONOCYTES 5.90 8.40 8.40   EOSINOPHILS 0.60 2.00 3.00   BASOPHILS 0.20 0.40 0.60   ASTSGOT  --  11*  --    ALTSGPT  --  8  --    ALKPHOSPHAT  --  62  --    TBILIRUBIN  --  0.5  --      Recent Labs     20  0502 20  0541   SODIUM 137 137   POTASSIUM 3.9 3.8   CHLORIDE 101 100   CO2 30 31   GLUCOSE 93 114*   BUN 9 7*   CREATININE 0.70 0.60   CALCIUM 8.2* 8.1*     Hemodynamics:  Temp (24hrs), Av.9 °C (98.4 °F), Min:36.7 °C (98.1 °F), Max:37.3 °C (99.2 °F)  Temperature: 36.8 °C (98.2 °F)  Pulse  Av.4  Min: 50  Max: 121   Blood Pressure: 127/69     Respiratory:    Respiration: 16, Pulse Oximetry: 90 %     Work Of Breathing / Effort: Mild  RUL Breath Sounds: Clear;Diminished, RML Breath Sounds: Clear;Diminished, RLL Breath Sounds: Diminished, RAMO Breath Sounds: Clear;Diminished, LLL Breath Sounds: Diminished  Fluids:    Intake/Output Summary (Last 24 hours) at 2020 1346  Last data filed at 2020 0716  Gross per 24 hour   Intake 853.02 ml   Output 895 ml   Net -41.98 ml        GI/Nutrition:  Orders Placed This Encounter   Procedures   • Diet Order Clear Liquid (start with sips)     Standing Status:   Standing     Number of Occurrences:   1     Order Specific Question:   Diet:     Answer:   Clear Liquid [10]     Comments:   start with sips     Medical Decision Making, by Problem:  Active Hospital Problems    Diagnosis   • GI bleed  [K92.2]   • Gastric mass [K31.89]   • S/P splenectomy [Z90.81]   • Acute blood loss anemia [D62]   • Sepsis (HCC) [A41.9]   • Thrombocytosis (HCC) [D47.3]   • Hyponatremia [E87.1]   • Hyperglycemia [R73.9]       Plan:  Large B-cell lymphoma-IM awaiting further testing results on his biopsy specimen to rule out double or triple hit or double expression.  Plan of care would be to stage him with a bone marrow biopsy and a PET scan upon discharge.  Reviewed all these issues with the patient in detail.  He has a good understanding of these issues  Anemia-related to his most recent GI bleed.  He is on IV iron infusions.  I will continue to follow him up.  Thank you    Quality-Core Measures

## 2020-06-19 ENCOUNTER — APPOINTMENT (OUTPATIENT)
Dept: RADIOLOGY | Facility: MEDICAL CENTER | Age: 65
DRG: 853 | End: 2020-06-19
Attending: SURGERY
Payer: COMMERCIAL

## 2020-06-19 ENCOUNTER — APPOINTMENT (OUTPATIENT)
Dept: RADIOLOGY | Facility: MEDICAL CENTER | Age: 65
DRG: 853 | End: 2020-06-19
Attending: INTERNAL MEDICINE
Payer: COMMERCIAL

## 2020-06-19 PROBLEM — M79.89 LEFT ARM SWELLING: Status: ACTIVE | Noted: 2020-06-19

## 2020-06-19 LAB
ANION GAP SERPL CALC-SCNC: 7 MMOL/L (ref 7–16)
BASOPHILS # BLD AUTO: 0.6 % (ref 0–1.8)
BASOPHILS # BLD: 0.09 K/UL (ref 0–0.12)
BUN SERPL-MCNC: 6 MG/DL (ref 8–22)
CALCIUM SERPL-MCNC: 8 MG/DL (ref 8.5–10.5)
CHLORIDE SERPL-SCNC: 99 MMOL/L (ref 96–112)
CO2 SERPL-SCNC: 28 MMOL/L (ref 20–33)
CREAT SERPL-MCNC: 0.55 MG/DL (ref 0.5–1.4)
EOSINOPHIL # BLD AUTO: 0.4 K/UL (ref 0–0.51)
EOSINOPHIL NFR BLD: 2.6 % (ref 0–6.9)
ERYTHROCYTE [DISTWIDTH] IN BLOOD BY AUTOMATED COUNT: 49.6 FL (ref 35.9–50)
GLUCOSE SERPL-MCNC: 92 MG/DL (ref 65–99)
HCT VFR BLD AUTO: 29.1 % (ref 42–52)
HGB BLD-MCNC: 9.1 G/DL (ref 14–18)
IMM GRANULOCYTES # BLD AUTO: 0.26 K/UL (ref 0–0.11)
IMM GRANULOCYTES NFR BLD AUTO: 1.7 % (ref 0–0.9)
LYMPHOCYTES # BLD AUTO: 1.75 K/UL (ref 1–4.8)
LYMPHOCYTES NFR BLD: 11.4 % (ref 22–41)
MCH RBC QN AUTO: 28.6 PG (ref 27–33)
MCHC RBC AUTO-ENTMCNC: 31.3 G/DL (ref 33.7–35.3)
MCV RBC AUTO: 91.5 FL (ref 81.4–97.8)
MONOCYTES # BLD AUTO: 1.39 K/UL (ref 0–0.85)
MONOCYTES NFR BLD AUTO: 9 % (ref 0–13.4)
NEUTROPHILS # BLD AUTO: 11.51 K/UL (ref 1.82–7.42)
NEUTROPHILS NFR BLD: 74.7 % (ref 44–72)
NRBC # BLD AUTO: 0 K/UL
NRBC BLD-RTO: 0 /100 WBC
PLATELET # BLD AUTO: 745 K/UL (ref 164–446)
PMV BLD AUTO: 8.5 FL (ref 9–12.9)
POTASSIUM SERPL-SCNC: 3.8 MMOL/L (ref 3.6–5.5)
RBC # BLD AUTO: 3.18 M/UL (ref 4.7–6.1)
SODIUM SERPL-SCNC: 134 MMOL/L (ref 135–145)
WBC # BLD AUTO: 15.4 K/UL (ref 4.8–10.8)

## 2020-06-19 PROCEDURE — 700101 HCHG RX REV CODE 250: Performed by: INTERNAL MEDICINE

## 2020-06-19 PROCEDURE — 85025 COMPLETE CBC W/AUTO DIFF WBC: CPT

## 2020-06-19 PROCEDURE — 700105 HCHG RX REV CODE 258: Performed by: SURGERY

## 2020-06-19 PROCEDURE — 700105 HCHG RX REV CODE 258

## 2020-06-19 PROCEDURE — 94760 N-INVAS EAR/PLS OXIMETRY 1: CPT

## 2020-06-19 PROCEDURE — 700111 HCHG RX REV CODE 636 W/ 250 OVERRIDE (IP): Performed by: SURGERY

## 2020-06-19 PROCEDURE — 80048 BASIC METABOLIC PNL TOTAL CA: CPT

## 2020-06-19 PROCEDURE — 770006 HCHG ROOM/CARE - MED/SURG/GYN SEMI*

## 2020-06-19 PROCEDURE — C9113 INJ PANTOPRAZOLE SODIUM, VIA: HCPCS | Performed by: SURGERY

## 2020-06-19 PROCEDURE — 700111 HCHG RX REV CODE 636 W/ 250 OVERRIDE (IP): Performed by: INTERNAL MEDICINE

## 2020-06-19 PROCEDURE — 700111 HCHG RX REV CODE 636 W/ 250 OVERRIDE (IP): Performed by: HOSPITALIST

## 2020-06-19 PROCEDURE — 94669 MECHANICAL CHEST WALL OSCILL: CPT

## 2020-06-19 PROCEDURE — A9270 NON-COVERED ITEM OR SERVICE: HCPCS | Performed by: INTERNAL MEDICINE

## 2020-06-19 PROCEDURE — 700105 HCHG RX REV CODE 258: Performed by: INTERNAL MEDICINE

## 2020-06-19 PROCEDURE — 36415 COLL VENOUS BLD VENIPUNCTURE: CPT

## 2020-06-19 PROCEDURE — 71045 X-RAY EXAM CHEST 1 VIEW: CPT

## 2020-06-19 PROCEDURE — 700102 HCHG RX REV CODE 250 W/ 637 OVERRIDE(OP): Performed by: INTERNAL MEDICINE

## 2020-06-19 PROCEDURE — 99232 SBSQ HOSP IP/OBS MODERATE 35: CPT | Performed by: INTERNAL MEDICINE

## 2020-06-19 RX ORDER — SODIUM CHLORIDE 9 MG/ML
INJECTION, SOLUTION INTRAVENOUS
Status: COMPLETED
Start: 2020-06-19 | End: 2020-06-19

## 2020-06-19 RX ADMIN — METRONIDAZOLE 500 MG: 500 INJECTION, SOLUTION INTRAVENOUS at 11:46

## 2020-06-19 RX ADMIN — METRONIDAZOLE 500 MG: 500 INJECTION, SOLUTION INTRAVENOUS at 18:30

## 2020-06-19 RX ADMIN — DOCUSATE SODIUM 50 MG AND SENNOSIDES 8.6 MG 2 TABLET: 8.6; 5 TABLET, FILM COATED ORAL at 05:43

## 2020-06-19 RX ADMIN — FLUCONAZOLE, SODIUM CHLORIDE 200 MG: 2 INJECTION INTRAVENOUS at 05:43

## 2020-06-19 RX ADMIN — DOCUSATE SODIUM 50 MG AND SENNOSIDES 8.6 MG 2 TABLET: 8.6; 5 TABLET, FILM COATED ORAL at 18:21

## 2020-06-19 RX ADMIN — METRONIDAZOLE 500 MG: 500 INJECTION, SOLUTION INTRAVENOUS at 23:04

## 2020-06-19 RX ADMIN — SODIUM CHLORIDE 500 ML: 9 INJECTION, SOLUTION INTRAVENOUS at 18:52

## 2020-06-19 RX ADMIN — ONDANSETRON 4 MG: 2 INJECTION INTRAMUSCULAR; INTRAVENOUS at 05:43

## 2020-06-19 RX ADMIN — CEFTRIAXONE SODIUM 2 G: 2 INJECTION, POWDER, FOR SOLUTION INTRAMUSCULAR; INTRAVENOUS at 18:17

## 2020-06-19 RX ADMIN — PROCHLORPERAZINE EDISYLATE 10 MG: 5 INJECTION INTRAMUSCULAR; INTRAVENOUS at 07:50

## 2020-06-19 RX ADMIN — METRONIDAZOLE 500 MG: 500 INJECTION, SOLUTION INTRAVENOUS at 00:14

## 2020-06-19 RX ADMIN — PANTOPRAZOLE SODIUM 40 MG: 40 INJECTION, POWDER, LYOPHILIZED, FOR SOLUTION INTRAVENOUS at 18:43

## 2020-06-19 RX ADMIN — SODIUM CHLORIDE, SODIUM LACTATE, POTASSIUM CHLORIDE, CALCIUM CHLORIDE AND DEXTROSE MONOHYDRATE: 5; 600; 310; 30; 20 INJECTION, SOLUTION INTRAVENOUS at 13:06

## 2020-06-19 RX ADMIN — PANTOPRAZOLE SODIUM 40 MG: 40 INJECTION, POWDER, LYOPHILIZED, FOR SOLUTION INTRAVENOUS at 05:43

## 2020-06-19 RX ADMIN — SODIUM CHLORIDE 250 MG: 9 INJECTION, SOLUTION INTRAVENOUS at 18:51

## 2020-06-19 ASSESSMENT — ENCOUNTER SYMPTOMS
DIZZINESS: 0
CHILLS: 0
HEMOPTYSIS: 0
MYALGIAS: 1
DIARRHEA: 0
WEAKNESS: 1
HEADACHES: 0
BACK PAIN: 1
ORTHOPNEA: 0
PALPITATIONS: 0
COUGH: 0
TINGLING: 0
FEVER: 0
MYALGIAS: 0
HEARTBURN: 0
BLOOD IN STOOL: 1
NERVOUS/ANXIOUS: 1
CLAUDICATION: 0
SINUS PAIN: 0
PSYCHIATRIC NEGATIVE: 1
EYES NEGATIVE: 1
WEIGHT LOSS: 1
ABDOMINAL PAIN: 1
SORE THROAT: 0
DEPRESSION: 0
SPUTUM PRODUCTION: 0

## 2020-06-19 NOTE — DISCHARGE PLANNING
Awaiting recommendations from the Care Team on Pt's discharge disposition. Will follow and assist with discharge as needed.

## 2020-06-19 NOTE — DOCUMENTATION QUERY
[facility]                                                                       Query Response Note      PATIENT:               SANTIAGO AQUINO  ACCT #:                  [id number]  MRN:                     [id number]  :                      1955  ADMIT DATE:       2020 3:59 PM  DISCH DATE:          RESPONDING  PROVIDER #:        [id number]           QUERY TEXT:    Severe malnutrition in context of chronic illness is documented in the Registered Dietician evaluation.      Please specify if you:    NOTE:  If an appropriate response is not listed below, please respond with a new note.    The patient's Clinical Indicators include:  Clinical indicators-  Per RD assessment on 6/15 - Malnutrition Risk: Pt with severe malnutrition in the context of chronic illness r/t newly dx gastric cancer as evidenced by severe wt loss of 10% x 2 months and PO < 75% of estimated needs x 1.5 months.    Treatments-   RD assessment & monitor   Diet advancement as medically able  Monitor weight    Risks-  Severe wt loss of 10% x 2 months and PO < 75% of estimated needs x 1.5 months.  Decreased PO to lack of appetite.  Diffuse large B cell lymphoma.    Thank you,  Julia Villanueva CDI RN  Connect via [employer]  Options provided:   -- Agree with dietician assessment of severe chronic illness related malnutrition   -- Disagree with dietician assessment of severe chronic illness related malnutrition   -- Unable to determine      Query created by: Julia Villaneuva on 2020 8:21 PM    RESPONSE TEXT:    Agree with dietician assessment of severe chronic illness related malnutrition          Electronically signed by:  NAIF MCCARTNEY MD 2020 3:46 PM

## 2020-06-19 NOTE — CARE PLAN
Problem: Knowledge Deficit  Goal: Knowledge of disease process/condition, treatment plan, diagnostic tests, and medications will improve  Outcome: PROGRESSING AS EXPECTED  Note: Pt updated on POC, educated about medications, labs, and procedures     Problem: Respiratory:  Goal: Respiratory status will improve  Outcome: PROGRESSING SLOWER THAN EXPECTED  Note: 4 L NC, thoracentesis this afternoon, no c/o SOB

## 2020-06-19 NOTE — PROGRESS NOTES
Received report from day shift RN. Assumed patient care   AOx4  Pain rated at 4/10, morphine PCA in use  3 L of O2 via nasal cannula, pulling 1500 on IS  Patient has complaints of nausea, no emesis, clear liquid diet, medicated per MAR  L chest tube with dressing in place to suction, patent, no leaks noted  Midline incision with pervena in place, no leaks noted  LLQ SUMAN drain with dressing in place, compressed, dressing intact  Ambulates SBA with steady gait  +void +flatus -BM  Call light within reach  Bed locked and in low position   POC discussed with patient  All needs met at this time.

## 2020-06-19 NOTE — PROGRESS NOTES
Oncology/Hematology Progress Note               Author: Glaina James M.D. Date & Time created: 6/19/2020  1:57 PM   Diagnosis-diffuse large B-cell lymphoma  Interval History:  He is doing well.  Pain is generally well controlled.  Respiratory status is better.  Pain is well controlled  Review of Systems:  Review of Systems   Constitutional: Positive for malaise/fatigue. Negative for fever.   HENT: Negative for hearing loss and sore throat.    Respiratory: Negative for cough, hemoptysis and sputum production.    Cardiovascular: Negative for chest pain, palpitations, orthopnea and claudication.   Gastrointestinal: Positive for abdominal pain. Negative for diarrhea and heartburn.   Genitourinary: Negative for dysuria and hematuria.   Musculoskeletal: Positive for back pain. Negative for joint pain and myalgias.   Skin: Negative for itching and rash.   Neurological: Negative for dizziness, tingling and headaches.   Psychiatric/Behavioral: Negative for depression. The patient is nervous/anxious.        Physical Exam:  Physical Exam  Constitutional:       General: He is not in acute distress.     Appearance: Normal appearance. He is not ill-appearing.   HENT:      Mouth/Throat:      Mouth: Mucous membranes are moist.      Pharynx: No oropharyngeal exudate or posterior oropharyngeal erythema.   Pulmonary:      Effort: No respiratory distress.      Breath sounds: No stridor. No wheezing or rhonchi.   Abdominal:      General: Abdomen is flat.      Palpations: There is no mass.      Tenderness: There is no abdominal tenderness.   Skin:     Coloration: Skin is pale. Skin is not jaundiced.      Findings: No bruising or erythema.   Neurological:      Mental Status: He is alert.         Labs:          Recent Labs     06/17/20  0541 06/19/20  0018   SODIUM 137 134*   POTASSIUM 3.8 3.8   CHLORIDE 100 99   CO2 31 28   BUN 7* 6*   CREATININE 0.60 0.55   CALCIUM 8.1* 8.0*     Recent Labs     06/17/20  0541 06/19/20  0018   ALTSGPT 8   --    ASTSGOT 11*  --    ALKPHOSPHAT 62  --    TBILIRUBIN 0.5  --    GLUCOSE 114* 92     Recent Labs     20  0541 209 20  0018   RBC 3.29* 3.23* 3.18*   HEMOGLOBIN 9.4* 9.4* 9.1*   HEMATOCRIT 30.3* 30.2* 29.1*   PLATELETCT 632* 684* 745*   IRON 9*  --   --    TOTIRONBC 132*  --   --      Recent Labs     20  0541 209 20  0018   WBC 16.4* 16.0* 15.4*   NEUTSPOLYS 78.00* 76.00* 74.70*   LYMPHOCYTES 9.80* 10.40* 11.40*   MONOCYTES 8.40 8.40 9.00   EOSINOPHILS 2.00 3.00 2.60   BASOPHILS 0.40 0.60 0.60   ASTSGOT 11*  --   --    ALTSGPT 8  --   --    ALKPHOSPHAT 62  --   --    TBILIRUBIN 0.5  --   --      Recent Labs     20  0541 20  0018   SODIUM 137 134*   POTASSIUM 3.8 3.8   CHLORIDE 100 99   CO2 31 28   GLUCOSE 114* 92   BUN 7* 6*   CREATININE 0.60 0.55   CALCIUM 8.1* 8.0*     Hemodynamics:  Temp (24hrs), Av.8 °C (98.3 °F), Min:36.1 °C (97 °F), Max:37.3 °C (99.2 °F)  Temperature: 36.7 °C (98 °F)  Pulse  Av.3  Min: 50  Max: 121   Blood Pressure: 126/78     Respiratory:    Respiration: (!) 46, Pulse Oximetry: 97 %     Work Of Breathing / Effort: Mild;Shallow  RUL Breath Sounds: Clear, RML Breath Sounds: Clear, RLL Breath Sounds: Diminished, RAMO Breath Sounds: Clear, LLL Breath Sounds: Diminished  Fluids:    Intake/Output Summary (Last 24 hours) at 2020 1346  Last data filed at 2020 0716  Gross per 24 hour   Intake 853.02 ml   Output 895 ml   Net -41.98 ml        GI/Nutrition:  Orders Placed This Encounter   Procedures   • Diet NPO     Standing Status:   Standing     Number of Occurrences:   1     Order Specific Question:   Restrict to:     Answer:   Strict [1]     Medical Decision Making, by Problem:  Active Hospital Problems    Diagnosis   • GI bleed [K92.2]   • Gastric mass [K31.89]   • S/P splenectomy [Z90.81]   • Acute blood loss anemia [D62]   • Sepsis (HCC) [A41.9]   • Thrombocytosis (HCC) [D47.3]   • Hyponatremia [E87.1]   • Hyperglycemia  [R73.9]       Plan:  Large B-cell lymphoma- awaiting further testing results on his biopsy specimen to rule out double or triple hit or double expression.  Plan of care would be to stage him with a bone marrow biopsy and a PET scan upon discharge.  Reviewed all these issues with the patient in detail.  He has a good understanding of these issues  Anemia-related to his most recent GI bleed.  He is on IV iron infusions.  I will continue to follow him up.  Thank you    Quality-Core Measures

## 2020-06-19 NOTE — PROGRESS NOTES
"    DATE: 6/19/2020    Post Operative Day  6 sleeve gastrectomy, splenectomy and partial resection of diaphragm....    Interval Events:  Left CT remains to suction  CXR worse left pleural effusion  Continue clear liquids  Ongoing iron replacement     PHYSICAL EXAMINATION:  Vital Signs: /78   Pulse 67   Temp 36.7 °C (98 °F) (Temporal)   Resp (!) 46   Ht 1.829 m (6' 0.01\")   Wt 102.9 kg (226 lb 13.7 oz)   SpO2 97%   Abdomen - soft / NT  Drain - serous - stripped  Left CT with serous drainage / stripped / no kinks/ remains on suction    Laboratory Values:   Recent Labs     06/17/20  0541 06/18/20  0449 06/19/20  0018   WBC 16.4* 16.0* 15.4*   RBC 3.29* 3.23* 3.18*   HEMOGLOBIN 9.4* 9.4* 9.1*   HEMATOCRIT 30.3* 30.2* 29.1*   MCV 92.1 93.5 91.5   MCH 28.6 29.1 28.6   MCHC 31.0* 31.1* 31.3*   RDW 50.9* 51.8* 49.6   PLATELETCT 632* 684* 745*   MPV 8.7* 8.4* 8.5*     Recent Labs     06/17/20  0541 06/19/20  0018   SODIUM 137 134*   POTASSIUM 3.8 3.8   CHLORIDE 100 99   CO2 31 28   GLUCOSE 114* 92   BUN 7* 6*   CREATININE 0.60 0.55   CALCIUM 8.1* 8.0*     Recent Labs     06/17/20  0541   ASTSGOT 11*   ALTSGPT 8   TBILIRUBIN 0.5   ALKPHOSPHAT 62   GLOBULIN 2.8            Imaging:   IR-US GUIDED PIV   Final Result    Ultrasound-guided PERIPHERAL IV INSERTION performed by    qualified nursing staff as above.      DX-CHEST-PORTABLE (1 VIEW)   Final Result         1.  Pulmonary infiltrate and layering pleural effusion, stable since prior study. Thoracostomy tube remains in place         US-EXTREMITY VENOUS UPPER UNILAT LEFT   Final Result      DX-CHEST-PORTABLE (1 VIEW)   Final Result         1.  Pulmonary infiltrate and layering pleural effusion, stable since prior study. Thoracostomy tube remains in place      DX-UPPER GI-SERIES WITH KUB   Final Result      Status post sleeve gastrectomy. No evidence of anastomotic leak.      DX-CHEST-LIMITED (1 VIEW)   Final Result         1.  Pulmonary infiltrate and layering " pleural effusion, new since prior study. Thoracostomy tube remains in place      DX-CHEST-LIMITED (1 VIEW)   Final Result      Trace left pneumothorax. Left-sided chest tubes.      Blunting of the right costophrenic angle may be related to a trace pleural effusion.      Mild bibasilar atelectasis.         CT-ABDOMEN-PELVIS WITH   Final Result         1.  Large heterogeneous masslike hypodensity within the spleen which contains air and nonspecific speckled areas of increased density. This is contiguous with the greater curvature of the stomach with associated gastric wall thickening and with air    likely tracking from the stomach into this splenic abnormality. Findings are most concerning for underlying malignancy with air in the splenic mass raising concern for possible superinfection. Endoscopic evaluation and biopsy should be considered.   2.  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 Additional shoddy and mildly prominent retroperitoneal lymph nodes.      These findings were discussed with JACQUELIN POWERS on 6/11/2020 6:30 PM.                  DX-CHEST-PORTABLE (1 VIEW)   Final Result      1.  There is no acute cardiopulmonary process.      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          Multiple lymph nodes around the stomach and splenic hilum are           negative for malignancy. Tumor is not seen at the gastric resection margin.          The posterior soft tissue margin of the spleen is widely positive for tumor.   B. Portion of left diaphragm:          High grade B-cell lymphoma involving the paradiaphragmatic soft           tissue.     Pleural effusion on left  Assessment & Plan  6/13  Partial resection of left diaphragm  Left CT to suction  6/16 CT to H2O seal  6/17 Increased left pleural  effusion / CT stripped and placed to suction  6/18 CT out 120 - serous / CXR with increased pleural effusion  6/19 still increasing effusion - plan for IR    S/P splenectomy  Assessment & Plan  6/13 - Splenectomy due to involvement  Needs Post splenectomy vaccinations    Acute blood loss anemia- (present on admission)  Assessment & Plan  6/17 Iron replcement    GI bleed- (present on admission)  Assessment & Plan  Large gastric mass invading into spleen  6/12 - EGD  6/13 - Sleeve gastrectomy, splenectomy, Resection of L hemidiaphragm (partial)  On zosyn  CLD       ____________________________________     Jerzy Bernabe M.D.

## 2020-06-19 NOTE — DIETARY
Nutrition Services: Brief Update    RD following pt for diet advancement and adequate PO intake.  Pt is NPO/Clear liquids x 7 days.  S/p splenectomy, possible staging with bone marrow biopsy and PET scan upon discharge.  IV iron infusion for GI bleed.    Plan/Recommend:   Advance diet as tolerated per MD.  If unable to advance diet in the next 48 hours, consider nutrition support.    RD will continue to follow.

## 2020-06-19 NOTE — PROGRESS NOTES
[facility] Medicine Daily Progress Note    Date of Service  6/19/2020    Chief Complaint  65 y.o. male admitted 6/11/2020 with Bloody stools    Hospital Course    Mr. Zendejas is a 65 y.o. male has a PMHx of HTN, perforated gastric cancer, who presented on 6/11/2020 with dark stools.  Morning of admission the patient presented to his primary care provider and was told that he had an abnormal CBC.  Stool sample was positive for blood.  Patient does report fatigue and dark bloody stools.  He then developed lightheadedness and an episode of hematemesis.  He does report vague abdominal pain, early satiety, and decreased appetite for weeks. Denies heavy alcohol and no blood thinners.  This led to his presentation to the emergency room.  Patient was admitted with sepsis secondary to abdominal infection.  Started on ceftriaxone and metronidazole.  CT abdomen was also concerning for gastric mass.  GI was consulted Dr. Ray.  The patient was started on a pantoprazole drip.  EGD demonstrated a large cratered stomach ulcer in the fundus/cardia area status post cold forceps biopsy.  There was also another cratered ulcer with perforation from tumor growth surgery.  General surgery Dr. Moura was consulted. Patient admitted for further evaluation.         Interval Problem Update  Pt resting in bed, has some abdominal pain, Surgey following,   Appreciate rec.   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  Left arm swelling doppler neg for DVT has superficial thrombosis.      Consultants/Specialty  -General Surgery - Dr. Moura  -Gastroenterology - Dr. Ray  -Oncology-      Code Status  FULL    Disposition  TBD    Review of Systems  Review of Systems   Constitutional: Positive for malaise/fatigue and weight loss. Negative for chills and fever.   HENT: Negative for congestion, ear pain and sinus pain.    Eyes: Negative.    Respiratory: Negative for cough and sputum production.   Called patient to schedule ERCP procedure. Left detailed message on mobile number to schedule procedure.   Zuleima Garrido CMA 02/07/19   Cardiovascular: Negative for chest pain, palpitations and orthopnea.   Gastrointestinal: Positive for abdominal pain, blood in stool and melena.   Genitourinary: Negative.    Musculoskeletal: Positive for myalgias.   Skin: Negative.    Neurological: Positive for weakness.   Psychiatric/Behavioral: Negative.    All other systems reviewed and are negative.       Physical Exam  Temp:  [36.1 °C (97 °F)-37.3 °C (99.2 °F)] 36.7 °C (98 °F)  Pulse:  [67-91] 67  Resp:  [16-46] 46  BP: (114-137)/(77-82) 126/78  SpO2:  [91 %-97 %] 97 %    Physical Exam  Vitals signs and nursing note reviewed.   HENT:      Head: Normocephalic.      Nose: Nose normal.      Mouth/Throat:      Mouth: Mucous membranes are moist.      Pharynx: Oropharynx is clear.   Eyes:      General: No scleral icterus.     Pupils: Pupils are equal, round, and reactive to light.   Neck:      Musculoskeletal: Normal range of motion.   Cardiovascular:      Rate and Rhythm: Normal rate and regular rhythm.      Pulses: Normal pulses.      Heart sounds: Normal heart sounds.   Pulmonary:      Effort: Pulmonary effort is normal.      Breath sounds: No wheezing or rhonchi.   Abdominal:      General: Bowel sounds are normal.      Palpations: Abdomen is soft.      Tenderness: There is abdominal tenderness. There is guarding.      Comments: S/p sleeve gastrectomy, splenectomy and partial LEFT diaphragm resection   Musculoskeletal: Normal range of motion.         General: Tenderness present.   Skin:     General: Skin is warm.      Capillary Refill: Capillary refill takes 2 to 3 seconds.   Neurological:      Mental Status: He is alert. Mental status is at baseline.         Fluids    Intake/Output Summary (Last 24 hours) at 6/19/2020 1335  Last data filed at 6/19/2020 1300  Gross per 24 hour   Intake 2340.05 ml   Output 1385 ml   Net 955.05 ml       Laboratory  Recent Labs     06/17/20  0541 06/18/20  0449 06/19/20  0018   WBC 16.4* 16.0* 15.4*   RBC 3.29* 3.23* 3.18*    HEMOGLOBIN 9.4* 9.4* 9.1*   HEMATOCRIT 30.3* 30.2* 29.1*   MCV 92.1 93.5 91.5   MCH 28.6 29.1 28.6   MCHC 31.0* 31.1* 31.3*   RDW 50.9* 51.8* 49.6   PLATELETCT 632* 684* 745*   MPV 8.7* 8.4* 8.5*     Recent Labs     06/17/20  0541 06/19/20  0018   SODIUM 137 134*   POTASSIUM 3.8 3.8   CHLORIDE 100 99   CO2 31 28   GLUCOSE 114* 92   BUN 7* 6*   CREATININE 0.60 0.55   CALCIUM 8.1* 8.0*                   Imaging  IR-US GUIDED PIV   Final Result    Ultrasound-guided PERIPHERAL IV INSERTION performed by    qualified nursing staff as above.      DX-CHEST-PORTABLE (1 VIEW)   Final Result         1.  Pulmonary infiltrate and layering pleural effusion, stable since prior study. Thoracostomy tube remains in place         US-EXTREMITY VENOUS UPPER UNILAT LEFT   Final Result      DX-CHEST-PORTABLE (1 VIEW)   Final Result         1.  Pulmonary infiltrate and layering pleural effusion, stable since prior study. Thoracostomy tube remains in place      DX-UPPER GI-SERIES WITH KUB   Final Result      Status post sleeve gastrectomy. No evidence of anastomotic leak.      DX-CHEST-LIMITED (1 VIEW)   Final Result         1.  Pulmonary infiltrate and layering pleural effusion, new since prior study. Thoracostomy tube remains in place      DX-CHEST-LIMITED (1 VIEW)   Final Result      Trace left pneumothorax. Left-sided chest tubes.      Blunting of the right costophrenic angle may be related to a trace pleural effusion.      Mild bibasilar atelectasis.         CT-ABDOMEN-PELVIS WITH   Final Result         1.  Large heterogeneous masslike hypodensity within the spleen which contains air and nonspecific speckled areas of increased density. This is contiguous with the greater curvature of the stomach with associated gastric wall thickening and with air    likely tracking from the stomach into this splenic abnormality. Findings are most concerning for underlying malignancy with air in the splenic mass raising concern for possible  superinfection. Endoscopic evaluation and biopsy should be considered.   2.  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 Additional shoddy and mildly prominent retroperitoneal lymph nodes.      These findings were discussed with JACQUELIN POWERS on 6/11/2020 6:30 PM.                  DX-CHEST-PORTABLE (1 VIEW)   Final Result      1.  There is no acute cardiopulmonary process.      IR-CONSULT AND TREAT    (Results Pending)        Assessment/Plan  Gastric mass- (present on admission)  Assessment & Plan  -Seen on CT and on exam with EGD.  -General surgery Dr. Moura is following surgery done on 6/13  -Sleeve gastrectomy, splenectomy and partial LEFT diaphragm resection  - results of biopsy from EGD showing large B cell Lymphoma Oncology, Dr. James consulted appreciate rec.     S/P splenectomy  Assessment & Plan  -6/13- sleeve gastrectomy, splenectomy and partial LEFT diaphragm resection    Sepsis (HCC)- (present on admission)  Assessment & Plan  -This is Sepsis Present on admission  SIRS criteria identified on my evaluation include: Tachycardia, with heart rate greater than 90 BPM and Leukocytosis, with WBC greater than 12,000  -Source is intra-abdominal  -Sepsis protocol initiated  -Fluid resuscitation ordered per protocol  -IV antibiotics as appropriate for source of sepsis  -While organ dysfunction may be noted elsewhere in this problem list or in the chart, degree of organ dysfunction does not meet CMS -criteria for severe sepsis  -Start ceftriaxone and metronidazole  -Await culture results  -CT abdomen/pelvis noted a large mass associated with the spleen and the stomach  -Radiology were also concerned about a possible superinfection  -Lactic acid has remained within normal range.  -Patient is at risk of worsening, does require close monitoring of his hemodynamics  -EGD confirms likely gastric mass with perforation as source of infection    Acute blood loss anemia- (present on  admission)  Assessment & Plan  -Likely from GI bleed complicated by gastric mass  -Patient is hemodynamically stable and asymptomatic  -Will continue to trend with CBC  -Transfuse to maintain hemoglobin greater than 7.  -Status post 1 unit of packed blood cells on 6/12/2020 and 6/13/2021  Results from last 7 days   Lab Units 06/13/20  1450 06/13/20  0331 06/12/20  1923 06/12/20  1127  06/12/20  0216 06/11/20  1553   HGB 1503 g/dL 10.4* 6.9* 7.7* 7.3*   < > 5.9* 8.0*   HCT 1504 % 31.9* 22.6*  --   --   --  18.9* 25.0*   MCV 1505 fL 89.1 91.1  --   --   --  87.9 85.3    < > = values in this interval not displayed.     Folate -Folic Acid   Date Value Ref Range Status   06/12/2020 11.0 >4.0 ng/mL Final     Vitamin B12 -True Cobalamin   Date Value Ref Range Status   06/12/2020 265 211 - 911 pg/mL Final     Ferritin   Date Value Ref Range Status   06/12/2020 546.0 (H) 22.0 - 322.0 ng/mL Final     Iron   Date Value Ref Range Status   06/12/2020 31 (L) 50 - 180 ug/dL Final     Total Iron Binding   Date Value Ref Range Status   06/12/2020 162 (L) 250 - 450 ug/dL Final     % Saturation   Date Value Ref Range Status   06/12/2020 19 15 - 55 % Final         GI bleed- (present on admission)  Assessment & Plan  -HIGH concern on admission due to hemoglobin drop  -came in with dark stool with blood in it and then red vomitus  -Monitor for bleeding  -ERP did discuss the case with GI who will follow along - EGD done  -Closely monitor for signs of hemodynamic instability  -EGD: Ulcer with adherent clot as noted above.      Left arm swelling  Assessment & Plan  US doppler neg for DVT  Shows superficial thrombosis      Hyperglycemia- (present on admission)  Assessment & Plan  -Mild, no need for coverage    Hyponatremia- (present on admission)  Assessment & Plan  -Improving  -Likely due to dehydration  -Start IV fluids  -Repeat BMP in the morning - continue to monitor    Thrombocytosis (HCC)- (present on admission)  Assessment &  Plan  -Likely due to anemia, possibly dehydration  -Repeat CBC in the morning       VTE prophylaxis: SCDs

## 2020-06-19 NOTE — RESPIRATORY CARE
Oxygen Rounds      Patient found on    O2 L/m:  3  Oxygen device:  snc  Spo2: 89%      Patient titrated to   O2 L/m: 4  Oxygen device: snc  Spo2: 92%   Respiratory device skin site inspection completed.

## 2020-06-19 NOTE — PROGRESS NOTES
Bedside report received. Assessment completed.  Pt is A&O x4. Pt on 3 L NC  Pain 1/10. PCA pump in use.   + nausea, medicating PRN per MAR   - numbness, - tingling.  Chest tube to L chest, -20 cm suction, dressing CDI.  Midline abdominal incision with prevena, CDI.  LLQ SMOOTH drain, + output, dressing CDI.   Last BM PTA. + flatus, +void.  Clear liquid diet, tolerates well.   Pt up SBA. Tolerates well.   Call light within reach. All needs met at this time. Fall Precautions and hourly rounding in place.

## 2020-06-20 ENCOUNTER — APPOINTMENT (OUTPATIENT)
Dept: RADIOLOGY | Facility: MEDICAL CENTER | Age: 65
DRG: 853 | End: 2020-06-20
Attending: SURGERY
Payer: COMMERCIAL

## 2020-06-20 LAB
ANION GAP SERPL CALC-SCNC: 6 MMOL/L (ref 7–16)
BASOPHILS # BLD AUTO: 0.8 % (ref 0–1.8)
BASOPHILS # BLD: 0.12 K/UL (ref 0–0.12)
BUN SERPL-MCNC: 5 MG/DL (ref 8–22)
CALCIUM SERPL-MCNC: 7.9 MG/DL (ref 8.5–10.5)
CHLORIDE SERPL-SCNC: 93 MMOL/L (ref 96–112)
CO2 SERPL-SCNC: 29 MMOL/L (ref 20–33)
CREAT SERPL-MCNC: 0.52 MG/DL (ref 0.5–1.4)
EOSINOPHIL # BLD AUTO: 0.42 K/UL (ref 0–0.51)
EOSINOPHIL NFR BLD: 2.8 % (ref 0–6.9)
ERYTHROCYTE [DISTWIDTH] IN BLOOD BY AUTOMATED COUNT: 50.6 FL (ref 35.9–50)
GLUCOSE SERPL-MCNC: 100 MG/DL (ref 65–99)
HCT VFR BLD AUTO: 29.7 % (ref 42–52)
HGB BLD-MCNC: 9.1 G/DL (ref 14–18)
IMM GRANULOCYTES # BLD AUTO: 0.27 K/UL (ref 0–0.11)
IMM GRANULOCYTES NFR BLD AUTO: 1.8 % (ref 0–0.9)
LYMPHOCYTES # BLD AUTO: 1.81 K/UL (ref 1–4.8)
LYMPHOCYTES NFR BLD: 12.1 % (ref 22–41)
MCH RBC QN AUTO: 28.4 PG (ref 27–33)
MCHC RBC AUTO-ENTMCNC: 30.6 G/DL (ref 33.7–35.3)
MCV RBC AUTO: 92.8 FL (ref 81.4–97.8)
MONOCYTES # BLD AUTO: 1.41 K/UL (ref 0–0.85)
MONOCYTES NFR BLD AUTO: 9.5 % (ref 0–13.4)
NEUTROPHILS # BLD AUTO: 10.88 K/UL (ref 1.82–7.42)
NEUTROPHILS NFR BLD: 73 % (ref 44–72)
NRBC # BLD AUTO: 0 K/UL
NRBC BLD-RTO: 0 /100 WBC
PLATELET # BLD AUTO: 858 K/UL (ref 164–446)
PMV BLD AUTO: 8.6 FL (ref 9–12.9)
POTASSIUM SERPL-SCNC: 3.7 MMOL/L (ref 3.6–5.5)
RBC # BLD AUTO: 3.2 M/UL (ref 4.7–6.1)
SODIUM SERPL-SCNC: 128 MMOL/L (ref 135–145)
WBC # BLD AUTO: 14.9 K/UL (ref 4.8–10.8)

## 2020-06-20 PROCEDURE — 99232 SBSQ HOSP IP/OBS MODERATE 35: CPT | Performed by: INTERNAL MEDICINE

## 2020-06-20 PROCEDURE — 700102 HCHG RX REV CODE 250 W/ 637 OVERRIDE(OP): Performed by: INTERNAL MEDICINE

## 2020-06-20 PROCEDURE — 700105 HCHG RX REV CODE 258

## 2020-06-20 PROCEDURE — 80048 BASIC METABOLIC PNL TOTAL CA: CPT

## 2020-06-20 PROCEDURE — 700111 HCHG RX REV CODE 636 W/ 250 OVERRIDE (IP): Performed by: SURGERY

## 2020-06-20 PROCEDURE — 700101 HCHG RX REV CODE 250: Performed by: INTERNAL MEDICINE

## 2020-06-20 PROCEDURE — 94760 N-INVAS EAR/PLS OXIMETRY 1: CPT

## 2020-06-20 PROCEDURE — 700105 HCHG RX REV CODE 258: Performed by: SURGERY

## 2020-06-20 PROCEDURE — 4410569 US-THORACENTESIS PUNCTURE

## 2020-06-20 PROCEDURE — 71045 X-RAY EXAM CHEST 1 VIEW: CPT

## 2020-06-20 PROCEDURE — C9113 INJ PANTOPRAZOLE SODIUM, VIA: HCPCS | Performed by: SURGERY

## 2020-06-20 PROCEDURE — 85025 COMPLETE CBC W/AUTO DIFF WBC: CPT

## 2020-06-20 PROCEDURE — 36415 COLL VENOUS BLD VENIPUNCTURE: CPT

## 2020-06-20 PROCEDURE — 94669 MECHANICAL CHEST WALL OSCILL: CPT

## 2020-06-20 PROCEDURE — 700111 HCHG RX REV CODE 636 W/ 250 OVERRIDE (IP): Performed by: INTERNAL MEDICINE

## 2020-06-20 PROCEDURE — 700111 HCHG RX REV CODE 636 W/ 250 OVERRIDE (IP): Performed by: HOSPITALIST

## 2020-06-20 PROCEDURE — A9270 NON-COVERED ITEM OR SERVICE: HCPCS | Performed by: INTERNAL MEDICINE

## 2020-06-20 PROCEDURE — 700105 HCHG RX REV CODE 258: Performed by: INTERNAL MEDICINE

## 2020-06-20 PROCEDURE — 770006 HCHG ROOM/CARE - MED/SURG/GYN SEMI*

## 2020-06-20 RX ORDER — SODIUM CHLORIDE 9 MG/ML
INJECTION, SOLUTION INTRAVENOUS
Status: COMPLETED
Start: 2020-06-20 | End: 2020-06-20

## 2020-06-20 RX ADMIN — FLUCONAZOLE, SODIUM CHLORIDE 200 MG: 2 INJECTION INTRAVENOUS at 06:28

## 2020-06-20 RX ADMIN — SODIUM CHLORIDE, SODIUM LACTATE, POTASSIUM CHLORIDE, CALCIUM CHLORIDE AND DEXTROSE MONOHYDRATE: 5; 600; 310; 30; 20 INJECTION, SOLUTION INTRAVENOUS at 16:31

## 2020-06-20 RX ADMIN — SODIUM CHLORIDE 500 ML: 9 INJECTION, SOLUTION INTRAVENOUS at 23:47

## 2020-06-20 RX ADMIN — SODIUM CHLORIDE, SODIUM LACTATE, POTASSIUM CHLORIDE, CALCIUM CHLORIDE AND DEXTROSE MONOHYDRATE: 5; 600; 310; 30; 20 INJECTION, SOLUTION INTRAVENOUS at 04:03

## 2020-06-20 RX ADMIN — METRONIDAZOLE 500 MG: 500 INJECTION, SOLUTION INTRAVENOUS at 10:25

## 2020-06-20 RX ADMIN — METRONIDAZOLE 500 MG: 500 INJECTION, SOLUTION INTRAVENOUS at 16:32

## 2020-06-20 RX ADMIN — Medication: at 04:03

## 2020-06-20 RX ADMIN — SODIUM CHLORIDE 250 MG: 9 INJECTION, SOLUTION INTRAVENOUS at 20:07

## 2020-06-20 RX ADMIN — CEFTRIAXONE SODIUM 2 G: 2 INJECTION, POWDER, FOR SOLUTION INTRAMUSCULAR; INTRAVENOUS at 17:37

## 2020-06-20 RX ADMIN — METRONIDAZOLE 500 MG: 500 INJECTION, SOLUTION INTRAVENOUS at 23:47

## 2020-06-20 RX ADMIN — PANTOPRAZOLE SODIUM 40 MG: 40 INJECTION, POWDER, LYOPHILIZED, FOR SOLUTION INTRAVENOUS at 06:28

## 2020-06-20 RX ADMIN — PROCHLORPERAZINE EDISYLATE 10 MG: 5 INJECTION INTRAMUSCULAR; INTRAVENOUS at 20:13

## 2020-06-20 RX ADMIN — DOCUSATE SODIUM 50 MG AND SENNOSIDES 8.6 MG 2 TABLET: 8.6; 5 TABLET, FILM COATED ORAL at 06:28

## 2020-06-20 RX ADMIN — PANTOPRAZOLE SODIUM 40 MG: 40 INJECTION, POWDER, LYOPHILIZED, FOR SOLUTION INTRAVENOUS at 17:36

## 2020-06-20 ASSESSMENT — ENCOUNTER SYMPTOMS
WEIGHT LOSS: 1
EYES NEGATIVE: 1
SINUS PAIN: 0
SPUTUM PRODUCTION: 0
WEAKNESS: 1
ABDOMINAL PAIN: 1
MYALGIAS: 1
PSYCHIATRIC NEGATIVE: 1
PALPITATIONS: 0
FEVER: 0
CHILLS: 0
ORTHOPNEA: 0
BLOOD IN STOOL: 1
COUGH: 0

## 2020-06-20 NOTE — PROGRESS NOTES
[facility] Medicine Daily Progress Note    Date of Service  6/20/2020    Chief Complaint  65 y.o. male admitted 6/11/2020 with Bloody stools    Hospital Course    Mr. Zendejas is a 65 y.o. male has a PMHx of HTN, perforated gastric cancer, who presented on 6/11/2020 with dark stools.  Morning of admission the patient presented to his primary care provider and was told that he had an abnormal CBC.  Stool sample was positive for blood.  Patient does report fatigue and dark bloody stools.  He then developed lightheadedness and an episode of hematemesis.  He does report vague abdominal pain, early satiety, and decreased appetite for weeks. Denies heavy alcohol and no blood thinners.  This led to his presentation to the emergency room.  Patient was admitted with sepsis secondary to abdominal infection.  Started on ceftriaxone and metronidazole.  CT abdomen was also concerning for gastric mass.  GI was consulted Dr. Ray.  The patient was started on a pantoprazole drip.  EGD demonstrated a large cratered stomach ulcer in the fundus/cardia area status post cold forceps biopsy.  There was also another cratered ulcer with perforation from tumor growth surgery.  General surgery Dr. Moura was consulted. Patient admitted for further evaluation.         Interval Problem Update  Pt seen and examined, pain better controlled  Surgey following,   Appreciate rec.   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  Left arm swelling doppler neg for DVT has superficial thrombosis.      Consultants/Specialty  -General Surgery - Dr. Moura  -Gastroenterology - Dr. Ray  -Oncology-      Code Status  FULL    Disposition  TBD    Review of Systems  Review of Systems   Constitutional: Positive for malaise/fatigue and weight loss. Negative for chills and fever.   HENT: Negative for congestion, ear pain and sinus pain.    Eyes: Negative.    Respiratory: Negative for cough  and sputum production.    Cardiovascular: Negative for chest pain, palpitations and orthopnea.   Gastrointestinal: Positive for abdominal pain, blood in stool and melena.   Genitourinary: Negative.    Musculoskeletal: Positive for myalgias.   Skin: Negative.    Neurological: Positive for weakness.   Psychiatric/Behavioral: Negative.    All other systems reviewed and are negative.       Physical Exam  Temp:  [36.4 °C (97.5 °F)-37.1 °C (98.8 °F)] 36.5 °C (97.7 °F)  Pulse:  [67-98] 76  Resp:  [16-18] 18  BP: (123-143)/(83-85) 143/85  SpO2:  [92 %-97 %] 95 %    Physical Exam  Vitals signs and nursing note reviewed.   HENT:      Head: Normocephalic.      Nose: Nose normal.      Mouth/Throat:      Mouth: Mucous membranes are moist.      Pharynx: Oropharynx is clear.   Eyes:      General: No scleral icterus.     Pupils: Pupils are equal, round, and reactive to light.   Neck:      Musculoskeletal: Normal range of motion.   Cardiovascular:      Rate and Rhythm: Normal rate and regular rhythm.      Pulses: Normal pulses.      Heart sounds: Normal heart sounds.   Pulmonary:      Effort: Pulmonary effort is normal.      Breath sounds: No wheezing or rhonchi.   Abdominal:      General: Bowel sounds are normal.      Palpations: Abdomen is soft.      Tenderness: There is abdominal tenderness. There is guarding.      Comments: S/p sleeve gastrectomy, splenectomy and partial LEFT diaphragm resection   Musculoskeletal: Normal range of motion.         General: Tenderness present.   Skin:     General: Skin is warm.      Capillary Refill: Capillary refill takes 2 to 3 seconds.   Neurological:      Mental Status: He is alert. Mental status is at baseline.         Fluids    Intake/Output Summary (Last 24 hours) at 6/20/2020 1103  Last data filed at 6/20/2020 1000  Gross per 24 hour   Intake 2677.1 ml   Output 1900 ml   Net 777.1 ml       Laboratory  Recent Labs     06/18/20  0449 06/19/20  0018 06/20/20  0526   WBC 16.0* 15.4* 14.9*   RBC  3.23* 3.18* 3.20*   HEMOGLOBIN 9.4* 9.1* 9.1*   HEMATOCRIT 30.2* 29.1* 29.7*   MCV 93.5 91.5 92.8   MCH 29.1 28.6 28.4   MCHC 31.1* 31.3* 30.6*   RDW 51.8* 49.6 50.6*   PLATELETCT 684* 745* 858*   MPV 8.4* 8.5* 8.6*     Recent Labs     06/19/20  0018 06/20/20  0526   SODIUM 134* 128*   POTASSIUM 3.8 3.7   CHLORIDE 99 93*   CO2 28 29   GLUCOSE 92 100*   BUN 6* 5*   CREATININE 0.55 0.52   CALCIUM 8.0* 7.9*                   Imaging  DX-CHEST-PORTABLE (1 VIEW)   Final Result         1. No significant interval change.      IR-US GUIDED PIV   Final Result    Ultrasound-guided PERIPHERAL IV INSERTION performed by    qualified nursing staff as above.      DX-CHEST-PORTABLE (1 VIEW)   Final Result         1.  Pulmonary infiltrate and layering pleural effusion, stable since prior study. Thoracostomy tube remains in place         US-EXTREMITY VENOUS UPPER UNILAT LEFT   Final Result      DX-CHEST-PORTABLE (1 VIEW)   Final Result         1.  Pulmonary infiltrate and layering pleural effusion, stable since prior study. Thoracostomy tube remains in place      DX-UPPER GI-SERIES WITH KUB   Final Result      Status post sleeve gastrectomy. No evidence of anastomotic leak.      DX-CHEST-LIMITED (1 VIEW)   Final Result         1.  Pulmonary infiltrate and layering pleural effusion, new since prior study. Thoracostomy tube remains in place      DX-CHEST-LIMITED (1 VIEW)   Final Result      Trace left pneumothorax. Left-sided chest tubes.      Blunting of the right costophrenic angle may be related to a trace pleural effusion.      Mild bibasilar atelectasis.         CT-ABDOMEN-PELVIS WITH   Final Result         1.  Large heterogeneous masslike hypodensity within the spleen which contains air and nonspecific speckled areas of increased density. This is contiguous with the greater curvature of the stomach with associated gastric wall thickening and with air    likely tracking from the stomach into this splenic abnormality. Findings are  most concerning for underlying malignancy with air in the splenic mass raising concern for possible superinfection. Endoscopic evaluation and biopsy should be considered.   2.  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 Additional shoddy and mildly prominent retroperitoneal lymph nodes.      These findings were discussed with JACQUELIN POWERS on 6/11/2020 6:30 PM.                  DX-CHEST-PORTABLE (1 VIEW)   Final Result      1.  There is no acute cardiopulmonary process.      US-THORACENTESIS PUNCTURE LEFT    (Results Pending)        Assessment/Plan  Gastric mass- (present on admission)  Assessment & Plan  -Seen on CT and on exam with EGD.  -General surgery Dr. Moura is following surgery done on 6/13  -Sleeve gastrectomy, splenectomy and partial LEFT diaphragm resection  - results of biopsy from EGD showing large B cell Lymphoma Oncology, Dr. James consulted appreciate rec.     S/P splenectomy  Assessment & Plan  -6/13- sleeve gastrectomy, splenectomy and partial LEFT diaphragm resection    Sepsis (HCC)- (present on admission)  Assessment & Plan  -This is Sepsis Present on admission  SIRS criteria identified on my evaluation include: Tachycardia, with heart rate greater than 90 BPM and Leukocytosis, with WBC greater than 12,000  -Source is intra-abdominal  -Sepsis protocol initiated  -Fluid resuscitation ordered per protocol  -IV antibiotics as appropriate for source of sepsis  -While organ dysfunction may be noted elsewhere in this problem list or in the chart, degree of organ dysfunction does not meet CMS -criteria for severe sepsis  -Start ceftriaxone and metronidazole  -Await culture results  -CT abdomen/pelvis noted a large mass associated with the spleen and the stomach  -Radiology were also concerned about a possible superinfection  -Lactic acid has remained within normal range.  -Patient is at risk of worsening, does require close monitoring of his hemodynamics  -EGD  confirms likely gastric mass with perforation as source of infection    Acute blood loss anemia- (present on admission)  Assessment & Plan  -Likely from GI bleed complicated by gastric mass  -Patient is hemodynamically stable and asymptomatic  -Will continue to trend with CBC  -Transfuse to maintain hemoglobin greater than 7.  -Status post 1 unit of packed blood cells on 6/12/2020 and 6/13/2021  Results from last 7 days   Lab Units 06/13/20  1450 06/13/20  0331 06/12/20  1923 06/12/20  1127  06/12/20  0216 06/11/20  1553   HGB 1503 g/dL 10.4* 6.9* 7.7* 7.3*   < > 5.9* 8.0*   HCT 1504 % 31.9* 22.6*  --   --   --  18.9* 25.0*   MCV 1505 fL 89.1 91.1  --   --   --  87.9 85.3    < > = values in this interval not displayed.     Folate -Folic Acid   Date Value Ref Range Status   06/12/2020 11.0 >4.0 ng/mL Final     Vitamin B12 -True Cobalamin   Date Value Ref Range Status   06/12/2020 265 211 - 911 pg/mL Final     Ferritin   Date Value Ref Range Status   06/12/2020 546.0 (H) 22.0 - 322.0 ng/mL Final     Iron   Date Value Ref Range Status   06/12/2020 31 (L) 50 - 180 ug/dL Final     Total Iron Binding   Date Value Ref Range Status   06/12/2020 162 (L) 250 - 450 ug/dL Final     % Saturation   Date Value Ref Range Status   06/12/2020 19 15 - 55 % Final         GI bleed- (present on admission)  Assessment & Plan  -HIGH concern on admission due to hemoglobin drop  -came in with dark stool with blood in it and then red vomitus  -Monitor for bleeding  -ERP did discuss the case with GI who will follow along - EGD done  -Closely monitor for signs of hemodynamic instability  -EGD: Ulcer with adherent clot as noted above.      Left arm swelling  Assessment & Plan  US doppler neg for DVT  Shows superficial thrombosis      Hyperglycemia- (present on admission)  Assessment & Plan  -Mild, no need for coverage    Hyponatremia- (present on admission)  Assessment & Plan  -Improving  -Likely due to dehydration  -Start IV fluids  -Repeat BMP  in the morning - continue to monitor    Thrombocytosis (HCC)- (present on admission)  Assessment & Plan  -Likely due to anemia, possibly dehydration  -Repeat CBC in the morning       VTE prophylaxis: SCDs

## 2020-06-20 NOTE — PROGRESS NOTES
Received report from day shift RN. Assumed patient care   AOx4  Pain rated at 4/10, morphine PCA in use  4 L of O2 via nasal cannula  Clear liquid diet  L chest tube with dressing in place to suction, patent, no leaks noted  Midline incision with pervena in place, no leaks noted  LLQ SMOOTH drain with dressing in place, compressed, dressing intact  Ambulates SBA with steady gait  +void +flatus -BM  Call light within reach  Bed locked and in low position   POC discussed with patient  All needs met at this time.

## 2020-06-20 NOTE — CARE PLAN
Problem: Knowledge Deficit  Goal: Knowledge of disease process/condition, treatment plan, diagnostic tests, and medications will improve  Outcome: PROGRESSING AS EXPECTED  Note: Updated on POC and medications  Problem: Pain Management  Goal: Pain level will decrease to patient's comfort goal  Outcome: PROGRESSING AS EXPECTED  Note: PCA pump in use, pain well controlled

## 2020-06-20 NOTE — PROGRESS NOTES
Bedside report received. Assessment completed.  Pt is A&O x4. Pt on 4 L NC  Pain 1/10. PCA pump in use.   - nausea.   - numbness, - tingling.  Chest tube to L chest, -20 cm suction, dressing CDI.  Midline abdominal incision with prevena, CDI.  LLQ SMOOTH drain, + output, dressing CDI.   Last BM PTA. + flatus, +void.  Clear liquid diet, tolerates well. Pt advanced to full liquid diet this morning.   Pt up SBA. Tolerates well.   Call light within reach. All needs met at this time. Fall Precautions and hourly rounding in place.

## 2020-06-20 NOTE — PROGRESS NOTES
"    DATE: 6/20/2020    Post Operative Day  6 sleeve gastrectomy, splenectomy and partial resection of diaphragm....    Interval Events:  Left CT remains to suction  CXR worse left pleural effusion  Continue clear liquids  Ongoing iron replacement     PHYSICAL EXAMINATION:  Vital Signs: /85   Pulse 76   Temp 36.5 °C (97.7 °F) (Temporal)   Resp 18   Ht 1.829 m (6' 0.01\")   Wt 102.9 kg (226 lb 13.7 oz)   SpO2 95%   Abdomen - soft / NT  Drain - serous - stripped  Left CT with serous drainage / stripped / no kinks/ remains on suction    Laboratory Values:   Recent Labs     06/18/20  0449 06/19/20  0018 06/20/20  0526   WBC 16.0* 15.4* 14.9*   RBC 3.23* 3.18* 3.20*   HEMOGLOBIN 9.4* 9.1* 9.1*   HEMATOCRIT 30.2* 29.1* 29.7*   MCV 93.5 91.5 92.8   MCH 29.1 28.6 28.4   MCHC 31.1* 31.3* 30.6*   RDW 51.8* 49.6 50.6*   PLATELETCT 684* 745* 858*   MPV 8.4* 8.5* 8.6*     Recent Labs     06/19/20  0018 06/20/20  0526   SODIUM 134* 128*   POTASSIUM 3.8 3.7   CHLORIDE 99 93*   CO2 28 29   GLUCOSE 92 100*   BUN 6* 5*   CREATININE 0.55 0.52   CALCIUM 8.0* 7.9*                Imaging:   DX-CHEST-PORTABLE (1 VIEW)   Final Result         1. No significant interval change.      IR-US GUIDED PIV   Final Result    Ultrasound-guided PERIPHERAL IV INSERTION performed by    qualified nursing staff as above.      DX-CHEST-PORTABLE (1 VIEW)   Final Result         1.  Pulmonary infiltrate and layering pleural effusion, stable since prior study. Thoracostomy tube remains in place         US-EXTREMITY VENOUS UPPER UNILAT LEFT   Final Result      DX-CHEST-PORTABLE (1 VIEW)   Final Result         1.  Pulmonary infiltrate and layering pleural effusion, stable since prior study. Thoracostomy tube remains in place      DX-UPPER GI-SERIES WITH KUB   Final Result      Status post sleeve gastrectomy. No evidence of anastomotic leak.      DX-CHEST-LIMITED (1 VIEW)   Final Result         1.  Pulmonary infiltrate and layering pleural effusion, new " since prior study. Thoracostomy tube remains in place      DX-CHEST-LIMITED (1 VIEW)   Final Result      Trace left pneumothorax. Left-sided chest tubes.      Blunting of the right costophrenic angle may be related to a trace pleural effusion.      Mild bibasilar atelectasis.         CT-ABDOMEN-PELVIS WITH   Final Result         1.  Large heterogeneous masslike hypodensity within the spleen which contains air and nonspecific speckled areas of increased density. This is contiguous with the greater curvature of the stomach with associated gastric wall thickening and with air    likely tracking from the stomach into this splenic abnormality. Findings are most concerning for underlying malignancy with air in the splenic mass raising concern for possible superinfection. Endoscopic evaluation and biopsy should be considered.   2.  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 Additional shoddy and mildly prominent retroperitoneal lymph nodes.      These findings were discussed with JACQUELIN POWERS on 6/11/2020 6:30 PM.                  DX-CHEST-PORTABLE (1 VIEW)   Final Result      1.  There is no acute cardiopulmonary process.      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          Multiple lymph nodes around the stomach and splenic hilum are           negative for malignancy. Tumor is not seen at the gastric resection margin.          The posterior soft tissue margin of the spleen is widely positive for tumor.   B. Portion of left diaphragm:          High grade B-cell lymphoma involving the paradiaphragmatic soft           tissue.   6/20 - plan for gastric sleeve diet    Pleural effusion on left  Assessment & Plan  6/13  Partial resection of left diaphragm  Left CT to suction  6/16 CT to H2O seal  6/17  Increased left pleural effusion / CT stripped and placed to suction  6/18 CT out 120 - serous / CXR with increased pleural effusion  6/19 still increasing effusion - plan for IR  6/20 IR for CT    S/P splenectomy  Assessment & Plan  6/13 - Splenectomy due to involvement  Needs Post splenectomy vaccinations    Acute blood loss anemia- (present on admission)  Assessment & Plan  6/17 Iron replcement    GI bleed- (present on admission)  Assessment & Plan  Large gastric mass invading into spleen  6/12 - EGD  6/13 - Sleeve gastrectomy, splenectomy, Resection of L hemidiaphragm (partial)  On zosyn  CLD  Once done with IR would advance diet to full's and treat as if patient had sleeve gastrectomy will provide booklet for sleeve gastrectomy and should advance along that diet path     ____________________________________     Jerzy Bernabe M.D.

## 2020-06-21 ENCOUNTER — ANESTHESIA (OUTPATIENT)
Dept: SURGERY | Facility: MEDICAL CENTER | Age: 65
DRG: 853 | End: 2020-06-21
Payer: COMMERCIAL

## 2020-06-21 ENCOUNTER — APPOINTMENT (OUTPATIENT)
Dept: RADIOLOGY | Facility: MEDICAL CENTER | Age: 65
DRG: 853 | End: 2020-06-21
Attending: SURGERY
Payer: COMMERCIAL

## 2020-06-21 ENCOUNTER — ANESTHESIA EVENT (OUTPATIENT)
Dept: SURGERY | Facility: MEDICAL CENTER | Age: 65
DRG: 853 | End: 2020-06-21
Payer: COMMERCIAL

## 2020-06-21 LAB
ANION GAP SERPL CALC-SCNC: 6 MMOL/L (ref 7–16)
BASOPHILS # BLD AUTO: 0.6 % (ref 0–1.8)
BASOPHILS # BLD: 0.09 K/UL (ref 0–0.12)
BUN SERPL-MCNC: 4 MG/DL (ref 8–22)
CALCIUM SERPL-MCNC: 8.2 MG/DL (ref 8.5–10.5)
CHLORIDE SERPL-SCNC: 96 MMOL/L (ref 96–112)
CO2 SERPL-SCNC: 30 MMOL/L (ref 20–33)
COVID ORDER STATUS COVID19: NORMAL
CREAT SERPL-MCNC: 0.53 MG/DL (ref 0.5–1.4)
EOSINOPHIL # BLD AUTO: 0.38 K/UL (ref 0–0.51)
EOSINOPHIL NFR BLD: 2.6 % (ref 0–6.9)
ERYTHROCYTE [DISTWIDTH] IN BLOOD BY AUTOMATED COUNT: 51.8 FL (ref 35.9–50)
GLUCOSE SERPL-MCNC: 102 MG/DL (ref 65–99)
HCT VFR BLD AUTO: 29 % (ref 42–52)
HGB BLD-MCNC: 8.8 G/DL (ref 14–18)
IMM GRANULOCYTES # BLD AUTO: 0.24 K/UL (ref 0–0.11)
IMM GRANULOCYTES NFR BLD AUTO: 1.7 % (ref 0–0.9)
LYMPHOCYTES # BLD AUTO: 1.18 K/UL (ref 1–4.8)
LYMPHOCYTES NFR BLD: 8.2 % (ref 22–41)
MCH RBC QN AUTO: 28.2 PG (ref 27–33)
MCHC RBC AUTO-ENTMCNC: 30.3 G/DL (ref 33.7–35.3)
MCV RBC AUTO: 92.9 FL (ref 81.4–97.8)
MONOCYTES # BLD AUTO: 1.4 K/UL (ref 0–0.85)
MONOCYTES NFR BLD AUTO: 9.8 % (ref 0–13.4)
NEUTROPHILS # BLD AUTO: 11.06 K/UL (ref 1.82–7.42)
NEUTROPHILS NFR BLD: 77.1 % (ref 44–72)
NRBC # BLD AUTO: 0 K/UL
NRBC BLD-RTO: 0 /100 WBC
PLATELET # BLD AUTO: 879 K/UL (ref 164–446)
PMV BLD AUTO: 8.6 FL (ref 9–12.9)
POTASSIUM SERPL-SCNC: 3.6 MMOL/L (ref 3.6–5.5)
RBC # BLD AUTO: 3.12 M/UL (ref 4.7–6.1)
SARS-COV-2 RDRP RESP QL NAA+PROBE: NOTDETECTED
SODIUM SERPL-SCNC: 132 MMOL/L (ref 135–145)
SPECIMEN SOURCE: NORMAL
WBC # BLD AUTO: 14.4 K/UL (ref 4.8–10.8)

## 2020-06-21 PROCEDURE — 700111 HCHG RX REV CODE 636 W/ 250 OVERRIDE (IP): Performed by: INTERNAL MEDICINE

## 2020-06-21 PROCEDURE — 700101 HCHG RX REV CODE 250: Performed by: SURGERY

## 2020-06-21 PROCEDURE — 700111 HCHG RX REV CODE 636 W/ 250 OVERRIDE (IP): Performed by: SURGERY

## 2020-06-21 PROCEDURE — 80048 BASIC METABOLIC PNL TOTAL CA: CPT

## 2020-06-21 PROCEDURE — 700105 HCHG RX REV CODE 258: Performed by: SURGERY

## 2020-06-21 PROCEDURE — 501838 HCHG SUTURE GENERAL: Performed by: SURGERY

## 2020-06-21 PROCEDURE — 36415 COLL VENOUS BLD VENIPUNCTURE: CPT

## 2020-06-21 PROCEDURE — 0BNT0ZZ RELEASE DIAPHRAGM, OPEN APPROACH: ICD-10-PCS | Performed by: SURGERY

## 2020-06-21 PROCEDURE — 700111 HCHG RX REV CODE 636 W/ 250 OVERRIDE (IP): Performed by: ANESTHESIOLOGY

## 2020-06-21 PROCEDURE — 0BNG4ZZ RELEASE LEFT UPPER LUNG LOBE, PERCUTANEOUS ENDOSCOPIC APPROACH: ICD-10-PCS | Performed by: SURGERY

## 2020-06-21 PROCEDURE — 94760 N-INVAS EAR/PLS OXIMETRY 1: CPT

## 2020-06-21 PROCEDURE — 500002 HCHG ADHESIVE, DERMABOND: Performed by: SURGERY

## 2020-06-21 PROCEDURE — 160009 HCHG ANES TIME/MIN: Performed by: SURGERY

## 2020-06-21 PROCEDURE — 501445 HCHG STAPLER, SKIN DISP: Performed by: SURGERY

## 2020-06-21 PROCEDURE — 0W9B40Z DRAINAGE OF LEFT PLEURAL CAVITY WITH DRAINAGE DEVICE, PERCUTANEOUS ENDOSCOPIC APPROACH: ICD-10-PCS | Performed by: SURGERY

## 2020-06-21 PROCEDURE — 160036 HCHG PACU - EA ADDL 30 MINS PHASE I: Performed by: SURGERY

## 2020-06-21 PROCEDURE — 700101 HCHG RX REV CODE 250: Performed by: ANESTHESIOLOGY

## 2020-06-21 PROCEDURE — U0004 COV-19 TEST NON-CDC HGH THRU: HCPCS

## 2020-06-21 PROCEDURE — 501399 HCHG SPECIMAN BAG, ENDO CATC: Performed by: SURGERY

## 2020-06-21 PROCEDURE — 160002 HCHG RECOVERY MINUTES (STAT): Performed by: SURGERY

## 2020-06-21 PROCEDURE — 160041 HCHG SURGERY MINUTES - EA ADDL 1 MIN LEVEL 4: Performed by: SURGERY

## 2020-06-21 PROCEDURE — A6402 STERILE GAUZE <= 16 SQ IN: HCPCS | Performed by: SURGERY

## 2020-06-21 PROCEDURE — 85025 COMPLETE CBC W/AUTO DIFF WBC: CPT

## 2020-06-21 PROCEDURE — 700111 HCHG RX REV CODE 636 W/ 250 OVERRIDE (IP): Performed by: HOSPITALIST

## 2020-06-21 PROCEDURE — 99232 SBSQ HOSP IP/OBS MODERATE 35: CPT | Performed by: INTERNAL MEDICINE

## 2020-06-21 PROCEDURE — 160035 HCHG PACU - 1ST 60 MINS PHASE I: Performed by: SURGERY

## 2020-06-21 PROCEDURE — 700105 HCHG RX REV CODE 258: Performed by: INTERNAL MEDICINE

## 2020-06-21 PROCEDURE — A6222 GAUZE <=16 IN NO W/SAL W/O B: HCPCS | Performed by: SURGERY

## 2020-06-21 PROCEDURE — 700101 HCHG RX REV CODE 250: Performed by: INTERNAL MEDICINE

## 2020-06-21 PROCEDURE — 700102 HCHG RX REV CODE 250 W/ 637 OVERRIDE(OP): Performed by: INTERNAL MEDICINE

## 2020-06-21 PROCEDURE — 71045 X-RAY EXAM CHEST 1 VIEW: CPT

## 2020-06-21 PROCEDURE — C1729 CATH, DRAINAGE: HCPCS | Performed by: SURGERY

## 2020-06-21 PROCEDURE — 501581 HCHG TROCAR: Performed by: SURGERY

## 2020-06-21 PROCEDURE — 770006 HCHG ROOM/CARE - MED/SURG/GYN SEMI*

## 2020-06-21 PROCEDURE — A9270 NON-COVERED ITEM OR SERVICE: HCPCS | Performed by: INTERNAL MEDICINE

## 2020-06-21 PROCEDURE — 0BNJ4ZZ RELEASE LEFT LOWER LUNG LOBE, PERCUTANEOUS ENDOSCOPIC APPROACH: ICD-10-PCS | Performed by: SURGERY

## 2020-06-21 PROCEDURE — 160048 HCHG OR STATISTICAL LEVEL 1-5: Performed by: SURGERY

## 2020-06-21 PROCEDURE — 502571 HCHG PACK, LAP CHOLE: Performed by: SURGERY

## 2020-06-21 PROCEDURE — C9113 INJ PANTOPRAZOLE SODIUM, VIA: HCPCS | Performed by: SURGERY

## 2020-06-21 PROCEDURE — A6404 STERILE GAUZE > 48 SQ IN: HCPCS | Performed by: SURGERY

## 2020-06-21 PROCEDURE — C9803 HOPD COVID-19 SPEC COLLECT: HCPCS | Performed by: SURGERY

## 2020-06-21 PROCEDURE — 160029 HCHG SURGERY MINUTES - 1ST 30 MINS LEVEL 4: Performed by: SURGERY

## 2020-06-21 RX ORDER — CEFAZOLIN SODIUM 2 G/100ML
2 INJECTION, SOLUTION INTRAVENOUS ONCE
Status: COMPLETED | OUTPATIENT
Start: 2020-06-21 | End: 2020-06-21

## 2020-06-21 RX ORDER — MEPERIDINE HYDROCHLORIDE 25 MG/ML
12.5 INJECTION INTRAMUSCULAR; INTRAVENOUS; SUBCUTANEOUS
Status: DISCONTINUED | OUTPATIENT
Start: 2020-06-21 | End: 2020-06-21 | Stop reason: HOSPADM

## 2020-06-21 RX ORDER — DIPHENHYDRAMINE HYDROCHLORIDE 50 MG/ML
12.5 INJECTION INTRAMUSCULAR; INTRAVENOUS
Status: DISCONTINUED | OUTPATIENT
Start: 2020-06-21 | End: 2020-06-21 | Stop reason: HOSPADM

## 2020-06-21 RX ORDER — SODIUM CHLORIDE, SODIUM LACTATE, POTASSIUM CHLORIDE, CALCIUM CHLORIDE 600; 310; 30; 20 MG/100ML; MG/100ML; MG/100ML; MG/100ML
INJECTION, SOLUTION INTRAVENOUS
Status: COMPLETED
Start: 2020-06-21 | End: 2020-06-21

## 2020-06-21 RX ORDER — OXYCODONE HCL 5 MG/5 ML
5 SOLUTION, ORAL ORAL
Status: DISCONTINUED | OUTPATIENT
Start: 2020-06-21 | End: 2020-06-21 | Stop reason: HOSPADM

## 2020-06-21 RX ORDER — OXYCODONE HCL 5 MG/5 ML
10 SOLUTION, ORAL ORAL
Status: DISCONTINUED | OUTPATIENT
Start: 2020-06-21 | End: 2020-06-21 | Stop reason: HOSPADM

## 2020-06-21 RX ORDER — ONDANSETRON 2 MG/ML
4 INJECTION INTRAMUSCULAR; INTRAVENOUS
Status: DISCONTINUED | OUTPATIENT
Start: 2020-06-21 | End: 2020-06-21 | Stop reason: HOSPADM

## 2020-06-21 RX ORDER — BUPIVACAINE HYDROCHLORIDE AND EPINEPHRINE 5; 5 MG/ML; UG/ML
INJECTION, SOLUTION EPIDURAL; INTRACAUDAL; PERINEURAL
Status: DISCONTINUED | OUTPATIENT
Start: 2020-06-21 | End: 2020-06-21 | Stop reason: HOSPADM

## 2020-06-21 RX ORDER — HYDRALAZINE HYDROCHLORIDE 20 MG/ML
5 INJECTION INTRAMUSCULAR; INTRAVENOUS
Status: DISCONTINUED | OUTPATIENT
Start: 2020-06-21 | End: 2020-06-21 | Stop reason: HOSPADM

## 2020-06-21 RX ORDER — MAGNESIUM HYDROXIDE 1200 MG/15ML
LIQUID ORAL
Status: COMPLETED | OUTPATIENT
Start: 2020-06-21 | End: 2020-06-21

## 2020-06-21 RX ORDER — HALOPERIDOL 5 MG/ML
1 INJECTION INTRAMUSCULAR
Status: DISCONTINUED | OUTPATIENT
Start: 2020-06-21 | End: 2020-06-21 | Stop reason: HOSPADM

## 2020-06-21 RX ORDER — ROCURONIUM BROMIDE 10 MG/ML
INJECTION, SOLUTION INTRAVENOUS PRN
Status: DISCONTINUED | OUTPATIENT
Start: 2020-06-21 | End: 2020-06-21 | Stop reason: SURG

## 2020-06-21 RX ORDER — SODIUM CHLORIDE, SODIUM LACTATE, POTASSIUM CHLORIDE, CALCIUM CHLORIDE 600; 310; 30; 20 MG/100ML; MG/100ML; MG/100ML; MG/100ML
INJECTION, SOLUTION INTRAVENOUS CONTINUOUS
Status: DISCONTINUED | OUTPATIENT
Start: 2020-06-21 | End: 2020-06-21 | Stop reason: HOSPADM

## 2020-06-21 RX ORDER — HYDROMORPHONE HYDROCHLORIDE 1 MG/ML
0.2 INJECTION, SOLUTION INTRAMUSCULAR; INTRAVENOUS; SUBCUTANEOUS
Status: DISCONTINUED | OUTPATIENT
Start: 2020-06-21 | End: 2020-06-21 | Stop reason: HOSPADM

## 2020-06-21 RX ORDER — METOPROLOL TARTRATE 1 MG/ML
1 INJECTION, SOLUTION INTRAVENOUS
Status: DISCONTINUED | OUTPATIENT
Start: 2020-06-21 | End: 2020-06-21 | Stop reason: HOSPADM

## 2020-06-21 RX ORDER — HYDROMORPHONE HYDROCHLORIDE 1 MG/ML
0.4 INJECTION, SOLUTION INTRAMUSCULAR; INTRAVENOUS; SUBCUTANEOUS
Status: DISCONTINUED | OUTPATIENT
Start: 2020-06-21 | End: 2020-06-21 | Stop reason: HOSPADM

## 2020-06-21 RX ORDER — OXYCODONE HCL 5 MG/5 ML
5 SOLUTION, ORAL ORAL EVERY 4 HOURS PRN
Status: DISCONTINUED | OUTPATIENT
Start: 2020-06-21 | End: 2020-06-25

## 2020-06-21 RX ORDER — HYDROMORPHONE HYDROCHLORIDE 1 MG/ML
0.6 INJECTION, SOLUTION INTRAMUSCULAR; INTRAVENOUS; SUBCUTANEOUS
Status: DISCONTINUED | OUTPATIENT
Start: 2020-06-21 | End: 2020-06-21 | Stop reason: HOSPADM

## 2020-06-21 RX ORDER — CEFAZOLIN SODIUM 1 G/3ML
INJECTION, POWDER, FOR SOLUTION INTRAMUSCULAR; INTRAVENOUS PRN
Status: DISCONTINUED | OUTPATIENT
Start: 2020-06-21 | End: 2020-06-21 | Stop reason: SURG

## 2020-06-21 RX ADMIN — CEFTRIAXONE SODIUM 2 G: 2 INJECTION, POWDER, FOR SOLUTION INTRAMUSCULAR; INTRAVENOUS at 18:02

## 2020-06-21 RX ADMIN — FENTANYL CITRATE 50 MCG: 50 INJECTION, SOLUTION INTRAMUSCULAR; INTRAVENOUS at 13:43

## 2020-06-21 RX ADMIN — DOCUSATE SODIUM 50 MG AND SENNOSIDES 8.6 MG 2 TABLET: 8.6; 5 TABLET, FILM COATED ORAL at 03:49

## 2020-06-21 RX ADMIN — FENTANYL CITRATE 100 MCG: 50 INJECTION, SOLUTION INTRAMUSCULAR; INTRAVENOUS at 13:53

## 2020-06-21 RX ADMIN — ROCURONIUM BROMIDE 50 MG: 10 INJECTION, SOLUTION INTRAVENOUS at 12:51

## 2020-06-21 RX ADMIN — METRONIDAZOLE 500 MG: 500 INJECTION, SOLUTION INTRAVENOUS at 23:13

## 2020-06-21 RX ADMIN — SODIUM CHLORIDE, SODIUM LACTATE, POTASSIUM CHLORIDE, CALCIUM CHLORIDE AND DEXTROSE MONOHYDRATE: 5; 600; 310; 30; 20 INJECTION, SOLUTION INTRAVENOUS at 04:33

## 2020-06-21 RX ADMIN — POLYETHYLENE GLYCOL 3350 1 PACKET: 17 POWDER, FOR SOLUTION ORAL at 03:51

## 2020-06-21 RX ADMIN — FENTANYL CITRATE 50 MCG: 50 INJECTION INTRAMUSCULAR; INTRAVENOUS at 15:34

## 2020-06-21 RX ADMIN — HYDROMORPHONE HYDROCHLORIDE 0.4 MG: 1 INJECTION, SOLUTION INTRAMUSCULAR; INTRAVENOUS; SUBCUTANEOUS at 14:44

## 2020-06-21 RX ADMIN — PROPOFOL 150 MG: 10 INJECTION, EMULSION INTRAVENOUS at 12:49

## 2020-06-21 RX ADMIN — PANTOPRAZOLE SODIUM 40 MG: 40 INJECTION, POWDER, LYOPHILIZED, FOR SOLUTION INTRAVENOUS at 03:48

## 2020-06-21 RX ADMIN — CEFAZOLIN 2 G: 330 INJECTION, POWDER, FOR SOLUTION INTRAMUSCULAR; INTRAVENOUS at 13:00

## 2020-06-21 RX ADMIN — METRONIDAZOLE 500 MG: 500 INJECTION, SOLUTION INTRAVENOUS at 16:29

## 2020-06-21 RX ADMIN — SUGAMMADEX 200 MG: 100 INJECTION, SOLUTION INTRAVENOUS at 13:59

## 2020-06-21 RX ADMIN — HYDROMORPHONE HYDROCHLORIDE 0.6 MG: 1 INJECTION, SOLUTION INTRAMUSCULAR; INTRAVENOUS; SUBCUTANEOUS at 14:23

## 2020-06-21 RX ADMIN — DOCUSATE SODIUM 50 MG AND SENNOSIDES 8.6 MG 2 TABLET: 8.6; 5 TABLET, FILM COATED ORAL at 18:01

## 2020-06-21 RX ADMIN — SODIUM CHLORIDE, SODIUM LACTATE, POTASSIUM CHLORIDE, CALCIUM CHLORIDE AND DEXTROSE MONOHYDRATE: 5; 600; 310; 30; 20 INJECTION, SOLUTION INTRAVENOUS at 23:13

## 2020-06-21 RX ADMIN — PANTOPRAZOLE SODIUM 40 MG: 40 INJECTION, POWDER, LYOPHILIZED, FOR SOLUTION INTRAVENOUS at 18:02

## 2020-06-21 RX ADMIN — SODIUM CHLORIDE 250 MG: 9 INJECTION, SOLUTION INTRAVENOUS at 19:20

## 2020-06-21 RX ADMIN — FLUCONAZOLE, SODIUM CHLORIDE 200 MG: 2 INJECTION INTRAVENOUS at 05:17

## 2020-06-21 RX ADMIN — METRONIDAZOLE 500 MG: 500 INJECTION, SOLUTION INTRAVENOUS at 09:08

## 2020-06-21 RX ADMIN — CEFAZOLIN 2 G: 330 INJECTION, POWDER, FOR SOLUTION INTRAMUSCULAR; INTRAVENOUS at 12:45

## 2020-06-21 RX ADMIN — EPHEDRINE SULFATE 10 MG: 50 INJECTION, SOLUTION INTRAVENOUS at 13:04

## 2020-06-21 RX ADMIN — CEFAZOLIN 2 G: 10 INJECTION, POWDER, FOR SOLUTION INTRAVENOUS at 10:45

## 2020-06-21 RX ADMIN — FENTANYL CITRATE 100 MCG: 50 INJECTION, SOLUTION INTRAMUSCULAR; INTRAVENOUS at 12:45

## 2020-06-21 ASSESSMENT — ENCOUNTER SYMPTOMS
CHILLS: 0
MYALGIAS: 1
MYALGIAS: 0
SPUTUM PRODUCTION: 0
HEMOPTYSIS: 0
PSYCHIATRIC NEGATIVE: 1
SORE THROAT: 0
NERVOUS/ANXIOUS: 1
FEVER: 0
TINGLING: 0
SINUS PAIN: 0
SHORTNESS OF BREATH: 1
DEPRESSION: 0
COUGH: 0
HEADACHES: 0
ORTHOPNEA: 0
BACK PAIN: 1
DIZZINESS: 0
ABDOMINAL PAIN: 1
EYES NEGATIVE: 1
BLOOD IN STOOL: 1
WEAKNESS: 1
PALPITATIONS: 0
DIARRHEA: 0
WEIGHT LOSS: 1
HEARTBURN: 0

## 2020-06-21 ASSESSMENT — PAIN SCALES - GENERAL: PAIN_LEVEL: 2

## 2020-06-21 NOTE — OP REPORT
DATE OF OPERATION: 6/21/2020     PREOPERATIVE DIAGNOSES: Loculated left pleural effusion.    POSTOPERATIVE DIAGNOSES: Loculated left pleural effusion.     PROCEDURES PERFORMED: Left posterolateral thoracoscopy. Left pulmonary decortication, Decortication of left upper lobe, left lower lobe and diaphragm.     SURGEON: Jerzy Bernabe M.D.     ASSISTANT: Dax Lua M.D.     ANESTHESIOLOGIST: Caesar Dykes M.D..     ANESTHESIA: Double lumen endotracheal tube general anesthesia.    ASA CLASSIFICATION: III.    INDICATIONS: The patient is a 65 y.o. male with a left Loculated left pleural effusion.. He is taken to the operating room for left thoracoscopic decortication and chest tube placement.     FINDINGS: Multiple loculated effusions left posterior superior and inferior fluid collections    WOUND CLASSIFICATION: Class II, Clean Contaminated.    SPECIMEN: Pleural peel for Gram stain, culture, and sensitivity.     ESTIMATED BLOOD LOSS: 100 mL.     PROCEDURE: Following informed consent consent, the patient was properly identified, taken to the operating room and placed in supine position where general endotracheal anesthesia was administered. Intravenous antibiotics were administered by the anesthesiologist in correct time interval. The patient voided prior to surgery. A urinary catheter was not placed. Sequential compression devices were employed. The patient was repositioned into a lateral decubitus position with careful attention to padding of the extremities and placement of an axillary roll. The left chest wall was prepped and draped into a sterile field.     Began the procedure by introducing a 5 mm bronchoscopic port into the previous chest tube site.  We then identified an area to place an additional 5 mm thoracoscopic port along the fifth intercostal costal space.  There was approximately 700 cc of serous fluid pulled along the dependent part of the chest cavity.  We then began bluntly I bring up  loculations along the posterior inferior aspects of the pleural space to allow for placement of an additional port.  We then introduced a large ring forceps through this and carefully debrided the loculations as well as the pleural adhesions along the chest wall.  We continued our dissection until we had the entirety of the lung free along the diaphragm as well as posteriorly and anteriorly and superiorly.  There was a defect in the medial portion of the diaphragm but it did not appear to communicate with the abdominal cavity.  We then identified a small area of what appeared to be intercostal bleed along our port site which was controlled with a figure-of-eight 0 Vicryl suture.  The area was carefully inspected the wound was copiously irrigated and hemostasis was confirmed.  Under direct visualization we placed a 32 Taiwanese angled chest tube along the diaphragm and a 28 Taiwanese straight chest tube along the posterior wall to the apex of the lung.  The lungs then reinsufflated and it reinflated well.  The 5 mm port site was closed with a single interrupted 4-0 Monocryl suture the chest tube was secured in place with 0 silk suture an 0 vertical mattress sutures placed across the opening to allow for closure after removal of the chest tube.    The patient tolerated the procedure well, and there were no apparent complications. All sponge, needle, and instrument counts were correct on 2 separate occasions. The patient was awakened, extubated, and transferred to  to the post anesthesia care unit (PACU) in satisfactory condition.       ____________________________________     Jerzy Bernabe M.D.    DD: 6/21/2020  2:27 PM

## 2020-06-21 NOTE — ANESTHESIA TIME REPORT
Anesthesia Start and Stop Event Times     Date Time Event    6/21/2020 1159 Ready for Procedure     1245 Anesthesia Start     1416 Anesthesia Stop        Responsible Staff  06/21/20    Name Role Begin End    Caesar Dykes M.D. Anesth 1245 1416        Preop Diagnosis (Free Text):  Pre-op Diagnosis     left pleural effusion        Preop Diagnosis (Codes):    Post op Diagnosis  S/P thoracotomy  Left VATS PLEUROdesis    Premium Reason  E. Weekend    Comments: PREMIUM ERIC

## 2020-06-21 NOTE — ANESTHESIA QCDR
2019 [facility] Clinical Data Registry (for Quality Improvement)     Postoperative nausea/vomiting risk protocol (Adult = 18 yrs and Pediatric 3-17 yrs)- (430 and 463)  General inhalation anesthetic (NOT TIVA) with PONV risk factors: Yes  Provision of anti-emetic therapy with at least 2 different classes of agents: Yes   Patient DID NOT receive anti-emetic therapy and reason is documented in Medical Record:  N/A    Multimodal Pain Management- (477)  Non-emergent surgery AND patient age >= 18:   Use of Multimodal Pain Management, two or more drugs and/or interventions, NOT including systemic opioids:   Exception: Documented allergy to multiple classes of analgesics:     Smoking Abstinence (404)  Patient is current smoker (cigarette, pipe, e-cig, marijuanna):   Elective Surgery:   Abstinence instructions provided prior to day of surgery:   Patient abstained from smoking on day of surgery:     Pre-Op Beta-Blocker in Isolated CABG (44)  Isolated CABG AND patient age >= 18:   Beta-blocker admin within 24 hours of surgical incision:   Exception:of medical reason(s) for not administering beta blocker within 24 hours prior to surgical incision (e.g., not  indicated,other medical reason):     PACU assessment of acute postoperative pain prior to Anesthesia Care End- Applies to Patients Age = 18- (ABG7)  Initial PACU pain score is which of the following: < 7/10  Patient unable to report pain score: N/A    Post-anesthetic transfer of care checklist/protocol to PACU/ICU- (426 and 427)  Upon conclusion of case, patient transferred to which of the following locations: PACU/Non-ICU  Use of transfer checklist/protocol:   Exclusion: Service Performed in Patient Hospital Room (and thus did not require transfer):   Unplanned admission to ICU related to anesthesia service up through end of PACU care- (MD51)  Unplanned admission to ICU (not initially anticipated at anesthesia start time): No         
left normal/right normal

## 2020-06-21 NOTE — ANESTHESIA PROCEDURE NOTES
Airway    Date/Time: 6/21/2020 12:51 PM  Performed by: Caesar Dykes M.D.  Authorized by: Caesar Dykes M.D.     Location:  OR  Urgency:  Elective  Indications for Airway Management:  Anesthesia      Spontaneous Ventilation: absent    Sedation Level:  Deep  Preoxygenated: Yes    Patient Position:  Sniffing  Final Airway Type:  Endotracheal airway  Final Endotracheal Airway:  ETT and ETT - double lumen left with ONE LUNG VENTILATION  Cuffed: Yes    Technique Used for Successful ETT Placement:  Direct laryngoscopy    Insertion Site:  Oral  Blade Type:  Jennifer  Laryngoscope Blade/Videolaryngoscope Blade Size:  3  ETT Size (mm):  7.5  ETT Double Lumen (fr):  39  Measured from:  Teeth  ETT to Teeth (cm):  31  Placement Verified by: auscultation and capnometry    Cormack-Lehane Classification:  Grade I - full view of glottis  Number of Attempts at Approach:  1

## 2020-06-21 NOTE — PROGRESS NOTES
[facility] Medicine Daily Progress Note    Date of Service  6/21/2020    Chief Complaint  65 y.o. male admitted 6/11/2020 with Bloody stools    Hospital Course    Mr. Zendejas is a 65 y.o. male has a PMHx of HTN, perforated gastric cancer, who presented on 6/11/2020 with dark stools.  Morning of admission the patient presented to his primary care provider and was told that he had an abnormal CBC.  Stool sample was positive for blood.  Patient does report fatigue and dark bloody stools.  He then developed lightheadedness and an episode of hematemesis.  He does report vague abdominal pain, early satiety, and decreased appetite for weeks. Denies heavy alcohol and no blood thinners.  This led to his presentation to the emergency room.  Patient was admitted with sepsis secondary to abdominal infection.  Started on ceftriaxone and metronidazole.  CT abdomen was also concerning for gastric mass.  GI was consulted Dr. Ray.  The patient was started on a pantoprazole drip.  EGD demonstrated a large cratered stomach ulcer in the fundus/cardia area status post cold forceps biopsy.  There was also another cratered ulcer with perforation from tumor growth surgery.  General surgery Dr. Moura was consulted. Patient admitted for further evaluation.         Interval Problem Update  No acute events overnight,  pain better controlled  Surgey following, plan for left thoracoscopy possible thoracotomy with decortication as pt continues to have loculated pleural effusion   Appreciate rec.   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        Consultants/Specialty  -General Surgery - Dr. Moura  -Gastroenterology - Dr. Ray  -Oncology-      Code Status  FULL    Disposition  TBD    Review of Systems  Review of Systems   Constitutional: Positive for malaise/fatigue and weight loss. Negative for chills and fever.   HENT: Negative for congestion, ear pain and sinus pain.    Eyes: Negative.     Respiratory: Negative for cough and sputum production.    Cardiovascular: Negative for chest pain, palpitations and orthopnea.   Gastrointestinal: Positive for abdominal pain, blood in stool and melena.   Genitourinary: Negative.    Musculoskeletal: Positive for myalgias.   Skin: Negative.    Neurological: Positive for weakness.   Psychiatric/Behavioral: Negative.    All other systems reviewed and are negative.       Physical Exam  Temp:  [36.7 °C (98.1 °F)-37.6 °C (99.7 °F)] 36.7 °C (98.1 °F)  Pulse:  [] 79  Resp:  [16-18] 18  BP: (115-138)/(74-86) 128/77  SpO2:  [90 %-100 %] 90 %    Physical Exam  Vitals signs and nursing note reviewed.   HENT:      Head: Normocephalic.      Nose: Nose normal.      Mouth/Throat:      Mouth: Mucous membranes are moist.      Pharynx: Oropharynx is clear.   Eyes:      General: No scleral icterus.     Pupils: Pupils are equal, round, and reactive to light.   Neck:      Musculoskeletal: Normal range of motion.   Cardiovascular:      Rate and Rhythm: Normal rate and regular rhythm.      Pulses: Normal pulses.      Heart sounds: Normal heart sounds.   Pulmonary:      Effort: Pulmonary effort is normal.      Breath sounds: No wheezing or rhonchi.   Abdominal:      General: Bowel sounds are normal.      Palpations: Abdomen is soft.      Tenderness: There is abdominal tenderness. There is guarding.      Comments: S/p sleeve gastrectomy, splenectomy and partial LEFT diaphragm resection   Musculoskeletal: Normal range of motion.         General: Tenderness present.   Skin:     General: Skin is warm.      Capillary Refill: Capillary refill takes 2 to 3 seconds.   Neurological:      Mental Status: He is alert. Mental status is at baseline.         Fluids    Intake/Output Summary (Last 24 hours) at 6/21/2020 1104  Last data filed at 6/21/2020 0900  Gross per 24 hour   Intake 2394.35 ml   Output 2705 ml   Net -310.65 ml       Laboratory  Recent Labs     06/19/20  0018 06/20/20  6720  06/21/20  0622   WBC 15.4* 14.9* 14.4*   RBC 3.18* 3.20* 3.12*   HEMOGLOBIN 9.1* 9.1* 8.8*   HEMATOCRIT 29.1* 29.7* 29.0*   MCV 91.5 92.8 92.9   MCH 28.6 28.4 28.2   MCHC 31.3* 30.6* 30.3*   RDW 49.6 50.6* 51.8*   PLATELETCT 745* 858* 879*   MPV 8.5* 8.6* 8.6*     Recent Labs     06/19/20  0018 06/20/20  0526 06/21/20  0622   SODIUM 134* 128* 132*   POTASSIUM 3.8 3.7 3.6   CHLORIDE 99 93* 96   CO2 28 29 30   GLUCOSE 92 100* 102*   BUN 6* 5* 4*   CREATININE 0.55 0.52 0.53   CALCIUM 8.0* 7.9* 8.2*                   Imaging  DX-CHEST-PORTABLE (1 VIEW)   Final Result         1. No significant interval change.      DX-CHEST-PORTABLE (1 VIEW)   Final Result      1.  Negative for pneumothorax after thoracentesis      2.  Left pleural effusion which appears radiographically large but by ultrasound was loculated      3.  Left chest tube present      US-THORACENTESIS PUNCTURE LEFT   Final Result      1. Ultrasound guided left sided therapeutic thoracentesis.      2. 110 mL of fluid withdrawn.      3. On sonographic evaluation the left pleural fluid appears to be markedly loculated and the left hemidiaphragm elevated. Only a small amount of fluid could be obtained probably due to loculation. These findings were discussed with Dr. Bernabe and    recommendation for CT scan with contrast was made to more accurately determine the degree of loculation, positioned left chest tube, and for potential procedural planning      Findings were discussed with ASIA BERNABE on 6/20/2020 7:40 PM.      DX-CHEST-PORTABLE (1 VIEW)   Final Result         1. No significant interval change.      IR-US GUIDED PIV   Final Result    Ultrasound-guided PERIPHERAL IV INSERTION performed by    qualified nursing staff as above.      DX-CHEST-PORTABLE (1 VIEW)   Final Result         1.  Pulmonary infiltrate and layering pleural effusion, stable since prior study. Thoracostomy tube remains in place         US-EXTREMITY VENOUS UPPER UNILAT LEFT   Final Result       DX-CHEST-PORTABLE (1 VIEW)   Final Result         1.  Pulmonary infiltrate and layering pleural effusion, stable since prior study. Thoracostomy tube remains in place      DX-UPPER GI-SERIES WITH KUB   Final Result      Status post sleeve gastrectomy. No evidence of anastomotic leak.      DX-CHEST-LIMITED (1 VIEW)   Final Result         1.  Pulmonary infiltrate and layering pleural effusion, new since prior study. Thoracostomy tube remains in place      DX-CHEST-LIMITED (1 VIEW)   Final Result      Trace left pneumothorax. Left-sided chest tubes.      Blunting of the right costophrenic angle may be related to a trace pleural effusion.      Mild bibasilar atelectasis.         CT-ABDOMEN-PELVIS WITH   Final Result         1.  Large heterogeneous masslike hypodensity within the spleen which contains air and nonspecific speckled areas of increased density. This is contiguous with the greater curvature of the stomach with associated gastric wall thickening and with air    likely tracking from the stomach into this splenic abnormality. Findings are most concerning for underlying malignancy with air in the splenic mass raising concern for possible superinfection. Endoscopic evaluation and biopsy should be considered.   2.  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 Additional shoddy and mildly prominent retroperitoneal lymph nodes.      These findings were discussed with JACQUELIN POWERS on 6/11/2020 6:30 PM.                  DX-CHEST-PORTABLE (1 VIEW)   Final Result      1.  There is no acute cardiopulmonary process.           Assessment/Plan  Gastric mass- (present on admission)  Assessment & Plan  -Seen on CT and on exam with EGD.  -General surgery Dr. Moura is following surgery done on 6/13  -Sleeve gastrectomy, splenectomy and partial LEFT diaphragm resection  - results of biopsy from EGD showing large B cell Lymphoma Oncology, Dr. James consulted appreciate rec.     Pleural effusion  on left  Assessment & Plan  Pt continues to have pleural effusion  Has chest tubes in, also had thoracentesis yesterday but not much drained  Looks like loculated   Surgery following and planning for left thoracoscopy possible thoracotomy with decortication  Appreciate rec.     S/P splenectomy  Assessment & Plan  -6/13- sleeve gastrectomy, splenectomy and partial LEFT diaphragm resection    Sepsis (HCC)- (present on admission)  Assessment & Plan  -This is Sepsis Present on admission  SIRS criteria identified on my evaluation include: Tachycardia, with heart rate greater than 90 BPM and Leukocytosis, with WBC greater than 12,000  -Source is intra-abdominal  -Sepsis protocol initiated  -Fluid resuscitation ordered per protocol  -IV antibiotics as appropriate for source of sepsis  -While organ dysfunction may be noted elsewhere in this problem list or in the chart, degree of organ dysfunction does not meet CMS -criteria for severe sepsis  -Start ceftriaxone and metronidazole  -Await culture results  -CT abdomen/pelvis noted a large mass associated with the spleen and the stomach  -Radiology were also concerned about a possible superinfection  -Lactic acid has remained within normal range.  -Patient is at risk of worsening, does require close monitoring of his hemodynamics  -EGD confirms likely gastric mass with perforation as source of infection    Acute blood loss anemia- (present on admission)  Assessment & Plan  -Likely from GI bleed complicated by gastric mass  -Patient is hemodynamically stable and asymptomatic  -Will continue to trend with CBC  -Transfuse to maintain hemoglobin greater than 7.  -Status post 1 unit of packed blood cells on 6/12/2020 and 6/13/2021  Results from last 7 days   Lab Units 06/13/20  1450 06/13/20  0331 06/12/20  1923 06/12/20  1127  06/12/20  0216 06/11/20  1553   HGB 1503 g/dL 10.4* 6.9* 7.7* 7.3*   < > 5.9* 8.0*   HCT 1504 % 31.9* 22.6*  --   --   --  18.9* 25.0*   MCV 1505 fL 89.1  91.1  --   --   --  87.9 85.3    < > = values in this interval not displayed.     Folate -Folic Acid   Date Value Ref Range Status   06/12/2020 11.0 >4.0 ng/mL Final     Vitamin B12 -True Cobalamin   Date Value Ref Range Status   06/12/2020 265 211 - 911 pg/mL Final     Ferritin   Date Value Ref Range Status   06/12/2020 546.0 (H) 22.0 - 322.0 ng/mL Final     Iron   Date Value Ref Range Status   06/12/2020 31 (L) 50 - 180 ug/dL Final     Total Iron Binding   Date Value Ref Range Status   06/12/2020 162 (L) 250 - 450 ug/dL Final     % Saturation   Date Value Ref Range Status   06/12/2020 19 15 - 55 % Final         GI bleed- (present on admission)  Assessment & Plan  -HIGH concern on admission due to hemoglobin drop  -came in with dark stool with blood in it and then red vomitus  -Monitor for bleeding  -ERP did discuss the case with GI who will follow along - EGD done  -Closely monitor for signs of hemodynamic instability  -EGD: Ulcer with adherent clot as noted above.      Left arm swelling  Assessment & Plan  US doppler neg for DVT  Shows superficial thrombosis      Hyperglycemia- (present on admission)  Assessment & Plan  -Mild, no need for coverage    Hyponatremia- (present on admission)  Assessment & Plan  -Improving  -Likely due to dehydration  -Start IV fluids  -Repeat BMP in the morning - continue to monitor    Thrombocytosis (HCC)- (present on admission)  Assessment & Plan  -Likely due to anemia, possibly dehydration  -Repeat CBC in the morning       VTE prophylaxis: SCDs

## 2020-06-21 NOTE — ANESTHESIA POSTPROCEDURE EVALUATION
Patient: Napoleon Zendejas    Procedure Summary     Date:  06/21/20 Room / Location:  [facility] OR 08 / SURGERY [facility]    Anesthesia Start:  1245 Anesthesia Stop:      Procedures:       THORACOSCOPY (Left Chest)      THORACOTOMY (Left Chest) Diagnosis:  (left pleural effusion)    Surgeon:  Jerzy Bernabe M.D. Responsible Provider:  Caesar Dykes M.D.    Anesthesia Type:  general ASA Status:  3          Final Anesthesia Type: general  Last vitals  BP   Blood Pressure: 134/81    Temp   36.8 °C (98.3 °F)    Pulse   Pulse: 82   Resp   18    SpO2   93 %      Anesthesia Post Evaluation    Patient location during evaluation: PACU  Patient participation: complete - patient participated  Level of consciousness: awake and alert  Pain score: 2    Airway patency: patent  Anesthetic complications: no  Cardiovascular status: hemodynamically stable  Respiratory status: acceptable  Hydration status: euvolemic    PONV: none           Nurse Pain Score: 1 (NPRS)

## 2020-06-21 NOTE — PROGRESS NOTES
"    DATE: 6/21/2020    Post Operative Day  8 sleeve gastrectomy, splenectomy and partial resection of diaphragm...    Interval Events:  Left CT remains to suction  CXR worse left pleural effusion  Continue full liquids  Ongoing iron replacement     PHYSICAL EXAMINATION:  Vital Signs: /74   Pulse 79   Temp 36.8 °C (98.2 °F) (Temporal)   Resp 18   Ht 1.829 m (6' 0.01\")   Wt 102.9 kg (226 lb 13.7 oz)   SpO2 90%   Abdomen - soft / NT  Drain - serous - stripped  Left CT with serous drainage / stripped / no kinks/ remains on suction    Laboratory Values:   Recent Labs     06/19/20  0018 06/20/20  0526 06/21/20  0622   WBC 15.4* 14.9* 14.4*   RBC 3.18* 3.20* 3.12*   HEMOGLOBIN 9.1* 9.1* 8.8*   HEMATOCRIT 29.1* 29.7* 29.0*   MCV 91.5 92.8 92.9   MCH 28.6 28.4 28.2   MCHC 31.3* 30.6* 30.3*   RDW 49.6 50.6* 51.8*   PLATELETCT 745* 858* 879*   MPV 8.5* 8.6* 8.6*     Recent Labs     06/19/20  0018 06/20/20  0526 06/21/20  0622   SODIUM 134* 128* 132*   POTASSIUM 3.8 3.7 3.6   CHLORIDE 99 93* 96   CO2 28 29 30   GLUCOSE 92 100* 102*   BUN 6* 5* 4*   CREATININE 0.55 0.52 0.53   CALCIUM 8.0* 7.9* 8.2*                Imaging:   DX-CHEST-PORTABLE (1 VIEW)   Final Result         1. No significant interval change.      DX-CHEST-PORTABLE (1 VIEW)   Final Result      1.  Negative for pneumothorax after thoracentesis      2.  Left pleural effusion which appears radiographically large but by ultrasound was loculated      3.  Left chest tube present      US-THORACENTESIS PUNCTURE LEFT   Final Result      1. Ultrasound guided left sided therapeutic thoracentesis.      2. 110 mL of fluid withdrawn.      3. On sonographic evaluation the left pleural fluid appears to be markedly loculated and the left hemidiaphragm elevated. Only a small amount of fluid could be obtained probably due to loculation. These findings were discussed with Dr. Bernabe and    recommendation for CT scan with contrast was made to more accurately determine the " degree of loculation, positioned left chest tube, and for potential procedural planning      Findings were discussed with ASIA MEANS on 6/20/2020 7:40 PM.      DX-CHEST-PORTABLE (1 VIEW)   Final Result         1. No significant interval change.      IR-US GUIDED PIV   Final Result    Ultrasound-guided PERIPHERAL IV INSERTION performed by    qualified nursing staff as above.      DX-CHEST-PORTABLE (1 VIEW)   Final Result         1.  Pulmonary infiltrate and layering pleural effusion, stable since prior study. Thoracostomy tube remains in place         US-EXTREMITY VENOUS UPPER UNILAT LEFT   Final Result      DX-CHEST-PORTABLE (1 VIEW)   Final Result         1.  Pulmonary infiltrate and layering pleural effusion, stable since prior study. Thoracostomy tube remains in place      DX-UPPER GI-SERIES WITH KUB   Final Result      Status post sleeve gastrectomy. No evidence of anastomotic leak.      DX-CHEST-LIMITED (1 VIEW)   Final Result         1.  Pulmonary infiltrate and layering pleural effusion, new since prior study. Thoracostomy tube remains in place      DX-CHEST-LIMITED (1 VIEW)   Final Result      Trace left pneumothorax. Left-sided chest tubes.      Blunting of the right costophrenic angle may be related to a trace pleural effusion.      Mild bibasilar atelectasis.         CT-ABDOMEN-PELVIS WITH   Final Result         1.  Large heterogeneous masslike hypodensity within the spleen which contains air and nonspecific speckled areas of increased density. This is contiguous with the greater curvature of the stomach with associated gastric wall thickening and with air    likely tracking from the stomach into this splenic abnormality. Findings are most concerning for underlying malignancy with air in the splenic mass raising concern for possible superinfection. Endoscopic evaluation and biopsy should be considered.   2.  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 Additional shoddy  and mildly prominent retroperitoneal lymph nodes.      These findings were discussed with JACQUELIN POWERS on 6/11/2020 6:30 PM.                  DX-CHEST-PORTABLE (1 VIEW)   Final Result      1.  There is no acute cardiopulmonary process.          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          Multiple lymph nodes around the stomach and splenic hilum are           negative for malignancy. Tumor is not seen at the gastric resection margin.          The posterior soft tissue margin of the spleen is widely positive for tumor.   B. Portion of left diaphragm:          High grade B-cell lymphoma involving the paradiaphragmatic soft           tissue.   6/20 - plan for gastric sleeve diet    Pleural effusion on left  Assessment & Plan  6/13  Partial resection of left diaphragm  Left CT to suction  6/16 CT to H2O seal  6/17 Increased left pleural effusion / CT stripped and placed to suction  6/18 CT out 120 - serous / CXR with increased pleural effusion  6/19 still increasing effusion - plan for IR  6/20 IR for CT    S/P splenectomy  Assessment & Plan  6/13 - Splenectomy due to involvement  Needs Post splenectomy vaccinations    Acute blood loss anemia- (present on admission)  Assessment & Plan  6/17 Iron replcement    GI bleed- (present on admission)  Assessment & Plan  Large gastric mass invading into spleen  6/12 - EGD  6/13 - Sleeve gastrectomy, splenectomy, Resection of L hemidiaphragm (partial)  On zosyn  CLD  IR with minimal drainage, loculated left pleural effusion.  Plan for left thoracoscopy possible thoracotomy with decortication.  Risks benefits alternatives discussed with the patient who wishes to proceed with the procedure described.  Need to repeat COVID test as it is currently 9 days old     ____________________________________     Jerzy Bernabe M.D.

## 2020-06-21 NOTE — OR NURSING
32FR CURVED CHEST TUBE/PLUER-EVAC AND 28FR STRAIGHT CHEST TUBE/PLUER EVAC TO LEFT CHEST.  APPLIED TO SUCTION.  SECURED WITH SUTURE AND DRESSED WITH DRAIN SPONGES, 4X4, AND MEDIPORE TAPE.

## 2020-06-21 NOTE — PROGRESS NOTES
100cc of fluid removed from LEFT chest.  Patient tolerated procedure well although fluid was difficult to remove.

## 2020-06-21 NOTE — CARE PLAN
Problem: Pain Management  Goal: Pain level will decrease to patient's comfort goal  Outcome: PROGRESSING AS EXPECTED  Intervention: Follow pain managment plan developed in collaboration with patient and Interdisciplinary Team  Note: PCA in place. Img/hr basal rate. Sleeping on and off. Resting comfortably.     Problem: Respiratory:  Goal: Respiratory status will improve  Outcome: PROGRESSING AS EXPECTED  Intervention: Administer and titrate oxygen therapy  Note: 4L o2, chest tube in place to suction, thoracentesis today. Patient states his respiratory status remains unchanged, mild dyspnea.     Problem: Bowel/Gastric:  Goal: Will not experience complications related to bowel motility  Outcome: PROGRESSING SLOWER THAN EXPECTED  Intervention: Assess baseline bowel pattern  Note: NO BM this admit. Patient admits to passing flatus, hypoactive bowel sounds.

## 2020-06-21 NOTE — PROGRESS NOTES
Patient rested well throughout shift  4L o2  AAOx4  PCA in place, patient resting comfortably.  SCDs on in bed  Chest tube to left chest to low continuous suction  IV ABX given, IVF infusing  No other complaints  Bed in

## 2020-06-21 NOTE — ASSESSMENT & PLAN NOTE
Pt continues to have pleural effusion, no fluid study however likely related to lymphoma  Has chest tubes in, also had thoracentesis on 6/20 but not much drained  Looks like loculated, Left posterolateral thoracoscopy. Left pulmonary decortication, Decortication of left upper lobe, left lower lobe and diaphragm on 6/21  Chest tube in place, continue monitoring with surgery team

## 2020-06-21 NOTE — RESPIRATORY CARE
Oxygen Rounds      Patient found on    O2 L/m:  4  Oxygen device:  Nasal Cannula  Spo2: 90%      Respiratory device skin site inspection completed.

## 2020-06-21 NOTE — ANESTHESIA PREPROCEDURE EVALUATION
Relevant Problems   No relevant active problems       Physical Exam    Airway   Mallampati: II  TM distance: >3 FB  Neck ROM: full       Cardiovascular - normal exam  Rhythm: regular  Rate: normal  (-) murmur     Dental - normal exam           Pulmonary - normal exam  Breath sounds clear to auscultation     Abdominal    Neurological - normal exam                 Anesthesia Plan    ASA 3   ASA physical status 3 criteria: respiratory insufficiency or compromise    Plan - general       Airway plan will be ETT  (Double LUMEN TUBE)      Induction: intravenous    Postoperative Plan: Postoperative administration of opioids is intended.    Pertinent diagnostic labs and testing reviewed    Informed Consent:    Anesthetic plan and risks discussed with patient.    Use of blood products discussed with: patient whom consented to blood products.

## 2020-06-22 ENCOUNTER — APPOINTMENT (OUTPATIENT)
Dept: RADIOLOGY | Facility: MEDICAL CENTER | Age: 65
DRG: 853 | End: 2020-06-22
Attending: SURGERY
Payer: COMMERCIAL

## 2020-06-22 LAB
ANION GAP SERPL CALC-SCNC: 7 MMOL/L (ref 7–16)
BASOPHILS # BLD AUTO: 0.6 % (ref 0–1.8)
BASOPHILS # BLD: 0.1 K/UL (ref 0–0.12)
BUN SERPL-MCNC: 3 MG/DL (ref 8–22)
CALCIUM SERPL-MCNC: 8.2 MG/DL (ref 8.5–10.5)
CHLORIDE SERPL-SCNC: 96 MMOL/L (ref 96–112)
CO2 SERPL-SCNC: 31 MMOL/L (ref 20–33)
CREAT SERPL-MCNC: 0.56 MG/DL (ref 0.5–1.4)
EOSINOPHIL # BLD AUTO: 0.13 K/UL (ref 0–0.51)
EOSINOPHIL NFR BLD: 0.8 % (ref 0–6.9)
ERYTHROCYTE [DISTWIDTH] IN BLOOD BY AUTOMATED COUNT: 51.1 FL (ref 35.9–50)
GLUCOSE SERPL-MCNC: 104 MG/DL (ref 65–99)
HCT VFR BLD AUTO: 30.8 % (ref 42–52)
HGB BLD-MCNC: 9.5 G/DL (ref 14–18)
IMM GRANULOCYTES # BLD AUTO: 0.21 K/UL (ref 0–0.11)
IMM GRANULOCYTES NFR BLD AUTO: 1.2 % (ref 0–0.9)
LYMPHOCYTES # BLD AUTO: 1.28 K/UL (ref 1–4.8)
LYMPHOCYTES NFR BLD: 7.6 % (ref 22–41)
MCH RBC QN AUTO: 28.4 PG (ref 27–33)
MCHC RBC AUTO-ENTMCNC: 30.8 G/DL (ref 33.7–35.3)
MCV RBC AUTO: 92.2 FL (ref 81.4–97.8)
MONOCYTES # BLD AUTO: 1.5 K/UL (ref 0–0.85)
MONOCYTES NFR BLD AUTO: 8.9 % (ref 0–13.4)
NEUTROPHILS # BLD AUTO: 13.62 K/UL (ref 1.82–7.42)
NEUTROPHILS NFR BLD: 80.9 % (ref 44–72)
NRBC # BLD AUTO: 0 K/UL
NRBC BLD-RTO: 0 /100 WBC
PLATELET # BLD AUTO: 1041 K/UL (ref 164–446)
PMV BLD AUTO: 8.6 FL (ref 9–12.9)
POTASSIUM SERPL-SCNC: 4.2 MMOL/L (ref 3.6–5.5)
RBC # BLD AUTO: 3.34 M/UL (ref 4.7–6.1)
SODIUM SERPL-SCNC: 134 MMOL/L (ref 135–145)
WBC # BLD AUTO: 16.8 K/UL (ref 4.8–10.8)

## 2020-06-22 PROCEDURE — 700111 HCHG RX REV CODE 636 W/ 250 OVERRIDE (IP): Performed by: HOSPITALIST

## 2020-06-22 PROCEDURE — 700105 HCHG RX REV CODE 258: Performed by: SURGERY

## 2020-06-22 PROCEDURE — 700101 HCHG RX REV CODE 250: Performed by: INTERNAL MEDICINE

## 2020-06-22 PROCEDURE — 71045 X-RAY EXAM CHEST 1 VIEW: CPT

## 2020-06-22 PROCEDURE — 36415 COLL VENOUS BLD VENIPUNCTURE: CPT

## 2020-06-22 PROCEDURE — A9270 NON-COVERED ITEM OR SERVICE: HCPCS | Performed by: INTERNAL MEDICINE

## 2020-06-22 PROCEDURE — 770006 HCHG ROOM/CARE - MED/SURG/GYN SEMI*

## 2020-06-22 PROCEDURE — 99232 SBSQ HOSP IP/OBS MODERATE 35: CPT | Performed by: INTERNAL MEDICINE

## 2020-06-22 PROCEDURE — C9113 INJ PANTOPRAZOLE SODIUM, VIA: HCPCS | Performed by: SURGERY

## 2020-06-22 PROCEDURE — 700111 HCHG RX REV CODE 636 W/ 250 OVERRIDE (IP): Performed by: SURGERY

## 2020-06-22 PROCEDURE — 302299 SYSTEM PREVENA INCISION MNGM: Performed by: INTERNAL MEDICINE

## 2020-06-22 PROCEDURE — 80048 BASIC METABOLIC PNL TOTAL CA: CPT

## 2020-06-22 PROCEDURE — 85025 COMPLETE CBC W/AUTO DIFF WBC: CPT

## 2020-06-22 PROCEDURE — 700102 HCHG RX REV CODE 250 W/ 637 OVERRIDE(OP): Performed by: INTERNAL MEDICINE

## 2020-06-22 RX ADMIN — SODIUM CHLORIDE, SODIUM LACTATE, POTASSIUM CHLORIDE, CALCIUM CHLORIDE AND DEXTROSE MONOHYDRATE: 5; 600; 310; 30; 20 INJECTION, SOLUTION INTRAVENOUS at 19:18

## 2020-06-22 RX ADMIN — Medication: at 00:13

## 2020-06-22 RX ADMIN — METRONIDAZOLE 500 MG: 500 INJECTION, SOLUTION INTRAVENOUS at 09:20

## 2020-06-22 RX ADMIN — POLYETHYLENE GLYCOL 3350 1 PACKET: 17 POWDER, FOR SOLUTION ORAL at 05:20

## 2020-06-22 RX ADMIN — PROMETHAZINE HYDROCHLORIDE 25 MG: 25 TABLET ORAL at 12:57

## 2020-06-22 RX ADMIN — FLUCONAZOLE, SODIUM CHLORIDE 200 MG: 2 INJECTION INTRAVENOUS at 05:19

## 2020-06-22 RX ADMIN — DOCUSATE SODIUM 50 MG AND SENNOSIDES 8.6 MG 2 TABLET: 8.6; 5 TABLET, FILM COATED ORAL at 05:19

## 2020-06-22 RX ADMIN — PANTOPRAZOLE SODIUM 40 MG: 40 INJECTION, POWDER, LYOPHILIZED, FOR SOLUTION INTRAVENOUS at 06:22

## 2020-06-22 RX ADMIN — PANTOPRAZOLE SODIUM 40 MG: 40 INJECTION, POWDER, LYOPHILIZED, FOR SOLUTION INTRAVENOUS at 22:04

## 2020-06-22 RX ADMIN — ACETAMINOPHEN 650 MG: 325 TABLET, FILM COATED ORAL at 05:20

## 2020-06-22 ASSESSMENT — ENCOUNTER SYMPTOMS
PSYCHIATRIC NEGATIVE: 1
PALPITATIONS: 0
SINUS PAIN: 0
ABDOMINAL PAIN: 1
WEAKNESS: 1
CHILLS: 0
WEIGHT LOSS: 1
FEVER: 0
SPUTUM PRODUCTION: 0
COUGH: 0
ORTHOPNEA: 0
MYALGIAS: 1
BLOOD IN STOOL: 1
EYES NEGATIVE: 1

## 2020-06-22 NOTE — CARE PLAN
Problem: Nutritional:  Goal: Achieve adequate nutritional intake  Description: Patient will consume 50% or > of meals  Outcome: PROGRESSING SLOWER THAN EXPECTED   Diet advanced to fulls 6/21 and has since advanced further to regular, 6 small meals today - pt noted with variable PO intake per chart, most recent meal <25%.  RD reviewed appropriate snacks and oral nutrition supplement regimen and will continue to monitor closely.

## 2020-06-22 NOTE — PROGRESS NOTES
[facility] Medicine Daily Progress Note    Date of Service  6/22/2020    Chief Complaint  65 y.o. male admitted 6/11/2020 with Bloody stools    Hospital Course    Mr. Zendejas is a 65 y.o. male has a PMHx of HTN, perforated gastric cancer, who presented on 6/11/2020 with dark stools.  Morning of admission the patient presented to his primary care provider and was told that he had an abnormal CBC.  Stool sample was positive for blood.  Patient does report fatigue and dark bloody stools.  He then developed lightheadedness and an episode of hematemesis.  He does report vague abdominal pain, early satiety, and decreased appetite for weeks. Denies heavy alcohol and no blood thinners.  This led to his presentation to the emergency room.  Patient was admitted with sepsis secondary to abdominal infection.  Started on ceftriaxone and metronidazole.  CT abdomen was also concerning for gastric mass.  GI was consulted Dr. Ray.  The patient was started on a pantoprazole drip.  EGD demonstrated a large cratered stomach ulcer in the fundus/cardia area status post cold forceps biopsy.  There was also another cratered ulcer with perforation from tumor growth surgery.  General surgery Dr. Moura was consulted. Patient admitted for further evaluation.         Interval Problem Update  Pt seen and examined no acute events overnight, had left posterolateral thoracoscopy. Left pulmonary decortication, decortication of left upper lobe, left lower lobe and diaphragm with surgery yesterday   pain better controlled    Surgey following appreciate rec    Pathology report from EGD shows large B cell lymphoma, oncology following appreciate rec.        Consultants/Specialty  -General Surgery - Dr. Moura/    -Gastroenterology - Dr. Ray  -Oncology-      Code Status  FULL    Disposition  TBD    Review of Systems  Review of Systems   Constitutional: Positive for malaise/fatigue and weight loss. Negative for chills and fever.   HENT:  Negative for congestion, ear pain and sinus pain.    Eyes: Negative.    Respiratory: Negative for cough and sputum production.    Cardiovascular: Negative for chest pain, palpitations and orthopnea.   Gastrointestinal: Positive for abdominal pain, blood in stool and melena.   Genitourinary: Negative.    Musculoskeletal: Positive for myalgias.   Skin: Negative.    Neurological: Positive for weakness.   Psychiatric/Behavioral: Negative.    All other systems reviewed and are negative.       Physical Exam  Temp:  [36.1 °C (96.9 °F)-37.6 °C (99.7 °F)] 36.8 °C (98.2 °F)  Pulse:  [] 98  Resp:  [14-25] 16  BP: (114-125)/(70-84) 125/84  SpO2:  [91 %-97 %] 95 %    Physical Exam  Vitals signs and nursing note reviewed.   HENT:      Head: Normocephalic.      Nose: Nose normal.      Mouth/Throat:      Mouth: Mucous membranes are moist.      Pharynx: Oropharynx is clear.   Eyes:      General: No scleral icterus.     Pupils: Pupils are equal, round, and reactive to light.   Neck:      Musculoskeletal: Normal range of motion.   Cardiovascular:      Rate and Rhythm: Normal rate and regular rhythm.      Pulses: Normal pulses.      Heart sounds: Normal heart sounds.   Pulmonary:      Effort: Pulmonary effort is normal.      Breath sounds: No wheezing or rhonchi.   Abdominal:      General: Bowel sounds are normal.      Palpations: Abdomen is soft.      Tenderness: There is abdominal tenderness. There is guarding.      Comments: S/p sleeve gastrectomy, splenectomy and partial LEFT diaphragm resection   Musculoskeletal: Normal range of motion.         General: Tenderness present.   Skin:     General: Skin is warm.      Capillary Refill: Capillary refill takes 2 to 3 seconds.   Neurological:      Mental Status: He is alert. Mental status is at baseline.         Fluids    Intake/Output Summary (Last 24 hours) at 6/22/2020 1139  Last data filed at 6/22/2020 0800  Gross per 24 hour   Intake 4164.35 ml   Output 2389 ml   Net 1775.35 ml        Laboratory  Recent Labs     06/20/20  0526 06/21/20  0622 06/22/20  0535   WBC 14.9* 14.4* 16.8*   RBC 3.20* 3.12* 3.34*   HEMOGLOBIN 9.1* 8.8* 9.5*   HEMATOCRIT 29.7* 29.0* 30.8*   MCV 92.8 92.9 92.2   MCH 28.4 28.2 28.4   MCHC 30.6* 30.3* 30.8*   RDW 50.6* 51.8* 51.1*   PLATELETCT 858* 879* 1041*   MPV 8.6* 8.6* 8.6*     Recent Labs     06/20/20  0526 06/21/20  0622 06/22/20  0535   SODIUM 128* 132* 134*   POTASSIUM 3.7 3.6 4.2   CHLORIDE 93* 96 96   CO2 29 30 31   GLUCOSE 100* 102* 104*   BUN 5* 4* 3*   CREATININE 0.52 0.53 0.56   CALCIUM 7.9* 8.2* 8.2*                   Imaging  DX-CHEST-PORTABLE (1 VIEW)   Final Result         1. No significant interval change.         DX-CHEST-LIMITED (1 VIEW)   Final Result      1.  Interval placement of 2nd left chest tube and repositioning of more inferior chest tube with significant evacuation of left pleural effusion      2.  Left airspace process most consistent with atelectasis      DX-CHEST-PORTABLE (1 VIEW)   Final Result         1. No significant interval change.      DX-CHEST-PORTABLE (1 VIEW)   Final Result      1.  Negative for pneumothorax after thoracentesis      2.  Left pleural effusion which appears radiographically large but by ultrasound was loculated      3.  Left chest tube present      US-THORACENTESIS PUNCTURE LEFT   Final Result      1. Ultrasound guided left sided therapeutic thoracentesis.      2. 110 mL of fluid withdrawn.      3. On sonographic evaluation the left pleural fluid appears to be markedly loculated and the left hemidiaphragm elevated. Only a small amount of fluid could be obtained probably due to loculation. These findings were discussed with Dr. Bernabe and    recommendation for CT scan with contrast was made to more accurately determine the degree of loculation, positioned left chest tube, and for potential procedural planning      Findings were discussed with ASIA BERNABE on 6/20/2020 7:40 PM.      DX-CHEST-PORTABLE (1 VIEW)    Final Result         1. No significant interval change.      IR-US GUIDED PIV   Final Result    Ultrasound-guided PERIPHERAL IV INSERTION performed by    qualified nursing staff as above.      DX-CHEST-PORTABLE (1 VIEW)   Final Result         1.  Pulmonary infiltrate and layering pleural effusion, stable since prior study. Thoracostomy tube remains in place         US-EXTREMITY VENOUS UPPER UNILAT LEFT   Final Result      DX-CHEST-PORTABLE (1 VIEW)   Final Result         1.  Pulmonary infiltrate and layering pleural effusion, stable since prior study. Thoracostomy tube remains in place      DX-UPPER GI-SERIES WITH KUB   Final Result      Status post sleeve gastrectomy. No evidence of anastomotic leak.      DX-CHEST-LIMITED (1 VIEW)   Final Result         1.  Pulmonary infiltrate and layering pleural effusion, new since prior study. Thoracostomy tube remains in place      DX-CHEST-LIMITED (1 VIEW)   Final Result      Trace left pneumothorax. Left-sided chest tubes.      Blunting of the right costophrenic angle may be related to a trace pleural effusion.      Mild bibasilar atelectasis.         CT-ABDOMEN-PELVIS WITH   Final Result         1.  Large heterogeneous masslike hypodensity within the spleen which contains air and nonspecific speckled areas of increased density. This is contiguous with the greater curvature of the stomach with associated gastric wall thickening and with air    likely tracking from the stomach into this splenic abnormality. Findings are most concerning for underlying malignancy with air in the splenic mass raising concern for possible superinfection. Endoscopic evaluation and biopsy should be considered.   2.  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 Additional shoddy and mildly prominent retroperitoneal lymph nodes.      These findings were discussed with JACQUELIN POWERS on 6/11/2020 6:30 PM.                  DX-CHEST-PORTABLE (1 VIEW)   Final Result       1.  There is no acute cardiopulmonary process.           Assessment/Plan  Gastric mass- (present on admission)  Assessment & Plan  -Seen on CT and on exam with EGD.  -General surgery Dr. Moura is following surgery done on 6/13  -Sleeve gastrectomy, splenectomy and partial LEFT diaphragm resection  - results of biopsy from EGD showing large B cell Lymphoma Oncology, Dr. James consulted appreciate rec.     Pleural effusion on left  Assessment & Plan  Pt continues to have pleural effusion  Has chest tubes in, also had thoracentesis on 6/20 but not much drained  Looks like loculated   Surgery following and pt had  Left posterolateral thoracoscopy. Left pulmonary decortication, Decortication of left upper lobe, left lower lobe and diaphragm on 6/21    S/P splenectomy  Assessment & Plan  -6/13- sleeve gastrectomy, splenectomy and partial LEFT diaphragm resection    Sepsis (HCC)- (present on admission)  Assessment & Plan  -This is Sepsis Present on admission  SIRS criteria identified on my evaluation include: Tachycardia, with heart rate greater than 90 BPM and Leukocytosis, with WBC greater than 12,000  -Source is intra-abdominal  -Sepsis protocol initiated  -Fluid resuscitation ordered per protocol  -IV antibiotics as appropriate for source of sepsis  -While organ dysfunction may be noted elsewhere in this problem list or in the chart, degree of organ dysfunction does not meet CMS -criteria for severe sepsis  -Start ceftriaxone and metronidazole  -Await culture results  -CT abdomen/pelvis noted a large mass associated with the spleen and the stomach  -Radiology were also concerned about a possible superinfection  -Lactic acid has remained within normal range.  -Patient is at risk of worsening, does require close monitoring of his hemodynamics  -EGD confirms likely gastric mass with perforation as source of infection    Acute blood loss anemia- (present on admission)  Assessment & Plan  -Likely from GI bleed complicated  by gastric mass  -Patient is hemodynamically stable and asymptomatic  -Will continue to trend with CBC  -Transfuse to maintain hemoglobin greater than 7.  -Status post 1 unit of packed blood cells on 6/12/2020 and 6/13/2021  Results from last 7 days   Lab Units 06/13/20  1450 06/13/20  0331 06/12/20  1923 06/12/20  1127  06/12/20  0216 06/11/20  1553   HGB 1503 g/dL 10.4* 6.9* 7.7* 7.3*   < > 5.9* 8.0*   HCT 1504 % 31.9* 22.6*  --   --   --  18.9* 25.0*   MCV 1505 fL 89.1 91.1  --   --   --  87.9 85.3    < > = values in this interval not displayed.     Folate -Folic Acid   Date Value Ref Range Status   06/12/2020 11.0 >4.0 ng/mL Final     Vitamin B12 -True Cobalamin   Date Value Ref Range Status   06/12/2020 265 211 - 911 pg/mL Final     Ferritin   Date Value Ref Range Status   06/12/2020 546.0 (H) 22.0 - 322.0 ng/mL Final     Iron   Date Value Ref Range Status   06/12/2020 31 (L) 50 - 180 ug/dL Final     Total Iron Binding   Date Value Ref Range Status   06/12/2020 162 (L) 250 - 450 ug/dL Final     % Saturation   Date Value Ref Range Status   06/12/2020 19 15 - 55 % Final         GI bleed- (present on admission)  Assessment & Plan  -HIGH concern on admission due to hemoglobin drop  -came in with dark stool with blood in it and then red vomitus  -Monitor for bleeding  -ERP did discuss the case with GI who will follow along - EGD done  -Closely monitor for signs of hemodynamic instability  -EGD: Ulcer with adherent clot as noted above.      Left arm swelling  Assessment & Plan  US doppler neg for DVT  Shows superficial thrombosis      Hyperglycemia- (present on admission)  Assessment & Plan  -Mild, no need for coverage    Hyponatremia- (present on admission)  Assessment & Plan  -Improving  -Likely due to dehydration  -Start IV fluids  -Repeat BMP in the morning - continue to monitor    Thrombocytosis (HCC)- (present on admission)  Assessment & Plan  -Likely due to anemia, possibly dehydration  -Repeat CBC in the  morning       VTE prophylaxis: SCDs

## 2020-06-22 NOTE — CARE PLAN
Problem: Fluid Volume:  Goal: Will maintain balanced intake and output  Outcome: PROGRESSING AS EXPECTED  Intervention: Monitor, educate, and encourage compliance with therapeutic intake of liquids  Note: IVF infusing, adequate urine output. Very edematous in upper extremeties.     Problem: Venous Thromboembolism (VTW)/Deep Vein Thrombosis (DVT) Prevention:  Goal: Patient will participate in Venous Thrombosis (VTE)/Deep Vein Thrombosis (DVT)Prevention Measures  Outcome: PROGRESSING AS EXPECTED  Intervention: Assess and monitor for anticoagulation complications  Note: SCDs on when in bed. No s/s of DVT or PE       Problem: Pain Management  Goal: Pain level will decrease to patient's comfort goal  Outcome: PROGRESSING AS EXPECTED  Intervention: Follow pain managment plan developed in collaboration with patient and Interdisciplinary Team  Note: IV PCA in place. Complains of a 4/10 pain.     Problem: Psychosocial Needs:  Goal: Level of anxiety will decrease  Outcome: PROGRESSING AS EXPECTED

## 2020-06-22 NOTE — THERAPY
"Missed Therapy     Patient Name: Napoleon Zendejas  Age:  65 y.o., Sex:  male  Medical Record #: [id number]  Today's Date: 6/22/2020    PT isabel attempted x2; patient declined and reported feeling lightheaded and \"not up for moving around.\" RN aware. Provided education regarding importance of mobility. Will re attempt as appropriate and able.    Haydee Peters, PT, DPT  Pager: [phone]  "

## 2020-06-22 NOTE — PROGRESS NOTES
Report received from RN, assumed care at 0700  Pt is A0X4, and responds appropriately   Pt declines any SOB, chest pain, new onset of numbness/ tingiling  Pt rates pain at /10, on a scale of 1-10, pt medicated per MAR  Pt is voiding adequatly and without hesitancy  Pt has + flatus, + bowel sounds,  BM 6/11/2020 PTA   Pt ambulates with a x1 assist   Pt is tolerating a full liquid diet, pt denies any nausea/vomiting  Pt has midline incision, prevena is in place, vac functioning no leaks noted at this time   Pt has LLQ abdomen SMOOTH, compressed to self suction, dressing changed and is clean, dry and intact   Pt has x2 left chest tube, posterior and anterior, dressings are clean, dry and intact, no leaks noted a this time, suction in place      Plan of care discussed, all questions answered. Explained importance of calling before getting OOB and pt verbalizes understanding. Explained importance of oral care. Call light is within reach, treaded slipper socks on, bed in lowest/ locked position, hourly rounding in place, all needs met at this time

## 2020-06-22 NOTE — DISCHARGE PLANNING
Pt had surgery yesterday for Thoracoscopy and sleeve gastrectomy and partial resection of diaphragm. Awaiting recommendations from the Care Team on Pt;s discharge disposition.

## 2020-06-22 NOTE — PROGRESS NOTES
AAOx4, continuous pulse ox, 5L NC  Sats maintaining in low 90s, no increased SOB, no increased work of breathing  IVF infusing, PCA infusing, IV ABX given  Bilateral upper extremities 2+edema, no change    Complaints of 4/10 pain, comfortable  2 chest tubes to left chest. To low suction, draining well, no air leak noted.     SMOOTH drain to LLQ  Provena in place    Refusing q2 turns, repositions self. Will continue to monitor    Critical PLT count of 1041 reported to Dr. Rock at 0730

## 2020-06-22 NOTE — THERAPY
"Occupational Therapy Contact Note     Patient Name: Napoleon Zendejas  Age:  65 y.o., Sex:  male  Medical Record #: [id number]  Today's Date: 6/22/2020 06/22/20 1015   Interdisciplinary Plan of Care Collaboration   IDT Collaboration with  Nursing   Collaboration Comments OT isabel attempted, pt seated in bedside chair but reports feeling \"nauseous and lightheaded\", not requesting BTB but declined participation in evaluation at this time. Will re-attempt as able/appropriate.       Narcisa Lowe OTR/L  pager# [phone]    "

## 2020-06-22 NOTE — PROGRESS NOTES
"    DATE: 6/22/2020    Post Operative Day  1 Left video-assisted thoracoscopy and day 9 sleeve gastrectomy, splenectomy and partial resection of diaphragm.    Interval Events:  Left VATS with evacuation of left loculated pleural effusion.    PHYSICAL EXAMINATION:  Vital Signs: /84   Pulse 98   Temp 36.8 °C (98.2 °F) (Temporal)   Resp 16   Ht 1.829 m (6' 0.01\")   Wt 102.9 kg (226 lb 13.7 oz)   SpO2 95%      Serous chest tube output. No air leak.  Abdomen is soft. Dressing is clean and dry/functioning.  Serous SMOOTH drainage.    Laboratory Values:   Recent Labs     06/20/20  0526 06/21/20  0622 06/22/20  0535   WBC 14.9* 14.4* 16.8*   RBC 3.20* 3.12* 3.34*   HEMOGLOBIN 9.1* 8.8* 9.5*   HEMATOCRIT 29.7* 29.0* 30.8*   MCV 92.8 92.9 92.2   MCH 28.4 28.2 28.4   MCHC 30.6* 30.3* 30.8*   RDW 50.6* 51.8* 51.1*   PLATELETCT 858* 879* 1041*   MPV 8.6* 8.6* 8.6*     Recent Labs     06/20/20  0526 06/21/20  0622 06/22/20  0535   SODIUM 128* 132* 134*   POTASSIUM 3.7 3.6 4.2   CHLORIDE 93* 96 96   CO2 29 30 31   GLUCOSE 100* 102* 104*   BUN 5* 4* 3*   CREATININE 0.52 0.53 0.56   CALCIUM 7.9* 8.2* 8.2*     Microbiology pending.  CXR without significant residual effusion.    ASSESSMENT AND PLAN:     Good progress:   Continue chest tubes to closed suction drainage.         ____________________________________     Mil Garcia M.D.      "

## 2020-06-23 ENCOUNTER — APPOINTMENT (OUTPATIENT)
Dept: RADIOLOGY | Facility: MEDICAL CENTER | Age: 65
DRG: 853 | End: 2020-06-23
Attending: SURGERY
Payer: COMMERCIAL

## 2020-06-23 PROBLEM — C85.99 GASTRIC LYMPHOMA (HCC): Status: ACTIVE | Noted: 2020-06-12

## 2020-06-23 LAB
ANION GAP SERPL CALC-SCNC: 7 MMOL/L (ref 7–16)
BASOPHILS # BLD AUTO: 0.5 % (ref 0–1.8)
BASOPHILS # BLD: 0.08 K/UL (ref 0–0.12)
BUN SERPL-MCNC: 5 MG/DL (ref 8–22)
CALCIUM SERPL-MCNC: 8 MG/DL (ref 8.5–10.5)
CHLORIDE SERPL-SCNC: 97 MMOL/L (ref 96–112)
CO2 SERPL-SCNC: 30 MMOL/L (ref 20–33)
CREAT SERPL-MCNC: 0.52 MG/DL (ref 0.5–1.4)
EOSINOPHIL # BLD AUTO: 0.28 K/UL (ref 0–0.51)
EOSINOPHIL NFR BLD: 1.8 % (ref 0–6.9)
ERYTHROCYTE [DISTWIDTH] IN BLOOD BY AUTOMATED COUNT: 52.5 FL (ref 35.9–50)
GLUCOSE SERPL-MCNC: 98 MG/DL (ref 65–99)
HCT VFR BLD AUTO: 29.1 % (ref 42–52)
HGB BLD-MCNC: 8.9 G/DL (ref 14–18)
IMM GRANULOCYTES # BLD AUTO: 0.21 K/UL (ref 0–0.11)
IMM GRANULOCYTES NFR BLD AUTO: 1.4 % (ref 0–0.9)
LYMPHOCYTES # BLD AUTO: 1.92 K/UL (ref 1–4.8)
LYMPHOCYTES NFR BLD: 12.3 % (ref 22–41)
MCH RBC QN AUTO: 28.2 PG (ref 27–33)
MCHC RBC AUTO-ENTMCNC: 30.6 G/DL (ref 33.7–35.3)
MCV RBC AUTO: 92.1 FL (ref 81.4–97.8)
MONOCYTES # BLD AUTO: 1.47 K/UL (ref 0–0.85)
MONOCYTES NFR BLD AUTO: 9.5 % (ref 0–13.4)
NEUTROPHILS # BLD AUTO: 11.59 K/UL (ref 1.82–7.42)
NEUTROPHILS NFR BLD: 74.5 % (ref 44–72)
NRBC # BLD AUTO: 0 K/UL
NRBC BLD-RTO: 0 /100 WBC
PLATELET # BLD AUTO: 1003 K/UL (ref 164–446)
PMV BLD AUTO: 8.6 FL (ref 9–12.9)
POTASSIUM SERPL-SCNC: 3.9 MMOL/L (ref 3.6–5.5)
RBC # BLD AUTO: 3.16 M/UL (ref 4.7–6.1)
SODIUM SERPL-SCNC: 134 MMOL/L (ref 135–145)
WBC # BLD AUTO: 15.6 K/UL (ref 4.8–10.8)

## 2020-06-23 PROCEDURE — 97165 OT EVAL LOW COMPLEX 30 MIN: CPT

## 2020-06-23 PROCEDURE — 71045 X-RAY EXAM CHEST 1 VIEW: CPT

## 2020-06-23 PROCEDURE — 99232 SBSQ HOSP IP/OBS MODERATE 35: CPT | Performed by: INTERNAL MEDICINE

## 2020-06-23 PROCEDURE — 700102 HCHG RX REV CODE 250 W/ 637 OVERRIDE(OP): Performed by: INTERNAL MEDICINE

## 2020-06-23 PROCEDURE — 80048 BASIC METABOLIC PNL TOTAL CA: CPT

## 2020-06-23 PROCEDURE — 97161 PT EVAL LOW COMPLEX 20 MIN: CPT

## 2020-06-23 PROCEDURE — 770006 HCHG ROOM/CARE - MED/SURG/GYN SEMI*

## 2020-06-23 PROCEDURE — 36415 COLL VENOUS BLD VENIPUNCTURE: CPT

## 2020-06-23 PROCEDURE — A9270 NON-COVERED ITEM OR SERVICE: HCPCS | Performed by: INTERNAL MEDICINE

## 2020-06-23 PROCEDURE — 85025 COMPLETE CBC W/AUTO DIFF WBC: CPT

## 2020-06-23 PROCEDURE — 700111 HCHG RX REV CODE 636 W/ 250 OVERRIDE (IP): Performed by: SURGERY

## 2020-06-23 PROCEDURE — C9113 INJ PANTOPRAZOLE SODIUM, VIA: HCPCS | Performed by: SURGERY

## 2020-06-23 PROCEDURE — 700105 HCHG RX REV CODE 258: Performed by: SURGERY

## 2020-06-23 RX ORDER — OMEPRAZOLE 20 MG/1
40 CAPSULE, DELAYED RELEASE ORAL DAILY
Status: DISCONTINUED | OUTPATIENT
Start: 2020-06-24 | End: 2020-06-26 | Stop reason: HOSPADM

## 2020-06-23 RX ADMIN — PANTOPRAZOLE SODIUM 40 MG: 40 INJECTION, POWDER, LYOPHILIZED, FOR SOLUTION INTRAVENOUS at 17:42

## 2020-06-23 RX ADMIN — Medication: at 17:57

## 2020-06-23 RX ADMIN — SODIUM CHLORIDE, SODIUM LACTATE, POTASSIUM CHLORIDE, CALCIUM CHLORIDE AND DEXTROSE MONOHYDRATE: 5; 600; 310; 30; 20 INJECTION, SOLUTION INTRAVENOUS at 16:18

## 2020-06-23 RX ADMIN — DOCUSATE SODIUM 50 MG AND SENNOSIDES 8.6 MG 2 TABLET: 8.6; 5 TABLET, FILM COATED ORAL at 17:41

## 2020-06-23 RX ADMIN — PANTOPRAZOLE SODIUM 40 MG: 40 INJECTION, POWDER, LYOPHILIZED, FOR SOLUTION INTRAVENOUS at 05:23

## 2020-06-23 ASSESSMENT — COGNITIVE AND FUNCTIONAL STATUS - GENERAL
STANDING UP FROM CHAIR USING ARMS: A LITTLE
DRESSING REGULAR UPPER BODY CLOTHING: A LITTLE
DAILY ACTIVITIY SCORE: 22
SUGGESTED CMS G CODE MODIFIER DAILY ACTIVITY: CI
WALKING IN HOSPITAL ROOM: A LITTLE
SUGGESTED CMS G CODE MODIFIER DAILY ACTIVITY: CJ
STANDING UP FROM CHAIR USING ARMS: A LITTLE
DAILY ACTIVITIY SCORE: 23
MOVING TO AND FROM BED TO CHAIR: A LITTLE
CLIMB 3 TO 5 STEPS WITH RAILING: A LITTLE
SUGGESTED CMS G CODE MODIFIER MOBILITY: CJ
HELP NEEDED FOR BATHING: A LITTLE
MOBILITY SCORE: 20
MOVING TO AND FROM BED TO CHAIR: A LITTLE
HELP NEEDED FOR BATHING: A LITTLE
CLIMB 3 TO 5 STEPS WITH RAILING: A LITTLE
SUGGESTED CMS G CODE MODIFIER MOBILITY: CK
TURNING FROM BACK TO SIDE WHILE IN FLAT BAD: A LITTLE
MOBILITY SCORE: 19
WALKING IN HOSPITAL ROOM: A LITTLE

## 2020-06-23 ASSESSMENT — ENCOUNTER SYMPTOMS
EYES NEGATIVE: 1
SINUS PAIN: 0
PALPITATIONS: 0
COUGH: 0
ABDOMINAL PAIN: 1
FEVER: 0
WEIGHT LOSS: 1
BLOOD IN STOOL: 0
ORTHOPNEA: 0
SPUTUM PRODUCTION: 0
WEAKNESS: 1
MYALGIAS: 1
CHILLS: 0
PSYCHIATRIC NEGATIVE: 1

## 2020-06-23 ASSESSMENT — GAIT ASSESSMENTS
DISTANCE (FEET): 350
ASSISTIVE DEVICE: FRONT WHEEL WALKER
GAIT LEVEL OF ASSIST: SUPERVISED

## 2020-06-23 ASSESSMENT — ACTIVITIES OF DAILY LIVING (ADL): TOILETING: INDEPENDENT

## 2020-06-23 NOTE — CARE PLAN
Problem: Fluid Volume:  Goal: Will maintain balanced intake and output  Outcome: PROGRESSING AS EXPECTED     Problem: Communication  Goal: The ability to communicate needs accurately and effectively will improve  Outcome: PROGRESSING AS EXPECTED     Problem: Safety  Goal: Will remain free from injury  Outcome: PROGRESSING AS EXPECTED  Goal: Will remain free from falls  Outcome: PROGRESSING AS EXPECTED

## 2020-06-23 NOTE — PROGRESS NOTES
[facility] Medicine Daily Progress Note    Date of Service  6/23/2020    Chief Complaint  65 y.o. male admitted 6/11/2020 with Bloody stools    Hospital Course    Mr. Zendejas is a 65 y.o. male has a PMHx of HTN, perforated gastric cancer, who presented on 6/11/2020 with dark stools.  Morning of admission the patient presented to his primary care provider and was told that he had an abnormal CBC.  Stool sample was positive for blood.  Patient does report fatigue and dark bloody stools.  He then developed lightheadedness and an episode of hematemesis.  He does report vague abdominal pain, early satiety, and decreased appetite for weeks. Denies heavy alcohol and no blood thinners.  This led to his presentation to the emergency room.  Patient was admitted with sepsis secondary to abdominal infection.  Started on ceftriaxone and metronidazole.  CT abdomen was also concerning for gastric mass.  GI was consulted Dr. Ray.  The patient was started on a pantoprazole drip.  EGD on 6/12 demonstrated a large cratered stomach ulcer in the fundus/cardia area status post cold forceps biopsy.  The patient underwent  exploratory celiotomy with a sleeve gastrectomy and splenectomy with partial resection of the left hemidiaphragm by Dr. 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   The patient developed left pleural effusion and he underwent left pulmonary decortication and chest tube        Interval Problem Update  -Evaluated and examined the patient at bedside, no fever last night and no events.  -Labs are reviewed, myoglobin has been stable, stable kidney function.  -PT and OT recommended home health      Consultants/Specialty  -General Surgery - Dr. Moura/    -Gastroenterology - Dr. Ray  -Oncology-      Code Status  FULL    Disposition  Home with home health after surgical clearance    Review of  Systems  Review of Systems   Constitutional: Positive for malaise/fatigue and weight loss. Negative for chills and fever.   HENT: Negative for congestion, ear pain and sinus pain.    Eyes: Negative.    Respiratory: Negative for cough and sputum production.    Cardiovascular: Negative for chest pain, palpitations and orthopnea.   Gastrointestinal: Positive for abdominal pain. Negative for blood in stool and melena.   Genitourinary: Negative.    Musculoskeletal: Positive for myalgias.   Skin: Negative.    Neurological: Positive for weakness.   Psychiatric/Behavioral: Negative.    All other systems reviewed and are negative.       Physical Exam  Temp:  [36.8 °C (98.3 °F)-37.9 °C (100.2 °F)] 37.3 °C (99.1 °F)  Pulse:  [] 110  Resp:  [16-19] 18  BP: (106-133)/(79-84) 125/84  SpO2:  [93 %-100 %] 100 %    Physical Exam  Vitals signs and nursing note reviewed.   HENT:      Head: Normocephalic.      Nose: Nose normal.      Mouth/Throat:      Mouth: Mucous membranes are moist.      Pharynx: Oropharynx is clear.   Eyes:      General: No scleral icterus.     Pupils: Pupils are equal, round, and reactive to light.   Neck:      Musculoskeletal: Normal range of motion.   Cardiovascular:      Rate and Rhythm: Normal rate and regular rhythm.      Pulses: Normal pulses.      Heart sounds: Normal heart sounds.   Pulmonary:      Effort: Pulmonary effort is normal.      Breath sounds: No wheezing or rhonchi.   Abdominal:      General: Bowel sounds are normal.      Palpations: Abdomen is soft.      Tenderness: There is abdominal tenderness. There is guarding.      Comments: S/p sleeve gastrectomy, splenectomy and partial LEFT diaphragm resection   Musculoskeletal: Normal range of motion.         General: Tenderness present.   Skin:     General: Skin is warm.      Capillary Refill: Capillary refill takes 2 to 3 seconds.   Neurological:      General: No focal deficit present.      Mental Status: He is alert. Mental status is at  baseline.      Cranial Nerves: No cranial nerve deficit.      Motor: No weakness.   Psychiatric:         Mood and Affect: Mood normal.         Fluids    Intake/Output Summary (Last 24 hours) at 6/23/2020 1859  Last data filed at 6/23/2020 1600  Gross per 24 hour   Intake 1018.47 ml   Output 1405 ml   Net -386.53 ml       Laboratory  Recent Labs     06/21/20  0622 06/22/20  0535 06/23/20  0642   WBC 14.4* 16.8* 15.6*   RBC 3.12* 3.34* 3.16*   HEMOGLOBIN 8.8* 9.5* 8.9*   HEMATOCRIT 29.0* 30.8* 29.1*   MCV 92.9 92.2 92.1   MCH 28.2 28.4 28.2   MCHC 30.3* 30.8* 30.6*   RDW 51.8* 51.1* 52.5*   PLATELETCT 879* 1041* 1003*   MPV 8.6* 8.6* 8.6*     Recent Labs     06/21/20  0622 06/22/20  0535 06/23/20  0642   SODIUM 132* 134* 134*   POTASSIUM 3.6 4.2 3.9   CHLORIDE 96 96 97   CO2 30 31 30   GLUCOSE 102* 104* 98   BUN 4* 3* 5*   CREATININE 0.53 0.56 0.52   CALCIUM 8.2* 8.2* 8.0*                   Imaging       Assessment/Plan  Gastric lymphoma (HCC)- (present on admission)  Assessment & Plan  Came with GI bleeding and found stomach ulcer  Pathology showed high-grade B lymphoma involved stomach and spleen  Sleeve gastrectomy, splenectomy and partial LEFT diaphragm resection was done by Dr. Zeny James consulted; PET scan, bone marrow study, and echo    Pleural effusion on left  Assessment & Plan  Pt continues to have pleural effusion, no fluid study however likely related to lymphoma  Has chest tubes in, also had thoracentesis on 6/20 but not much drained  Looks like loculated, Left posterolateral thoracoscopy. Left pulmonary decortication, Decortication of left upper lobe, left lower lobe and diaphragm on 6/21  Chest tube in place, continue monitoring with surgery team    S/P splenectomy  Assessment & Plan  -6/13- sleeve gastrectomy, splenectomy and partial LEFT diaphragm resection  Lymphoma    Sepsis (HCC)- (present on admission)  Assessment & Plan  -This is Sepsis Present on admission  SIRS criteria identified on my  evaluation include: Tachycardia, with heart rate greater than 90 BPM and Leukocytosis, with WBC greater than 12,000  -Source is intra-abdominal  -Sepsis protocol initiated  -Fluid resuscitation ordered per protocol  -IV antibiotics as appropriate for source of sepsis  -While organ dysfunction may be noted elsewhere in this problem list or in the chart, degree of organ dysfunction does not meet CMS -criteria for severe sepsis  -Start ceftriaxone and metronidazole  -Await culture results  -CT abdomen/pelvis noted a large mass associated with the spleen and the stomach  -Radiology were also concerned about a possible superinfection  -Lactic acid has remained within normal range.  -Patient is at risk of worsening, does require close monitoring of his hemodynamics  -EGD confirms likely gastric mass with perforation as source of infection    Acute blood loss anemia- (present on admission)  Assessment & Plan  -Likely from GI bleed complicated by gastric mass  -Patient is hemodynamically stable and asymptomatic  -Will continue to trend with CBC  -Transfuse to maintain hemoglobin greater than 7.  -Status post 1 unit of packed blood cells on 6/12/2020 and 6/13/2021      GI bleed- (present on admission)  Assessment & Plan  Came with upper GI bleeding and hemoglobin dropped with dark stool  EGD: Ulcer with adherent clot as noted above.  Underwent surgery  Stable  Labs daily      Left arm swelling  Assessment & Plan  US doppler neg for DVT  Shows superficial thrombosis      Hyperglycemia- (present on admission)  Assessment & Plan  -Mild, no need for coverage    Hyponatremia- (present on admission)  Assessment & Plan  -Improving  -Likely due to dehydration  -Start IV fluids  -Repeat BMP in the morning - continue to monitor    Thrombocytosis (HCC)- (present on admission)  Assessment & Plan  -Likely due to anemia, lymphoma  -Repeat CBC in the morning       VTE prophylaxis: SCDs

## 2020-06-23 NOTE — PROGRESS NOTES
Assumed care of patient. Patient's dressings to midline abdomen and left  abdomen/side dry and intact. Patient states pain is controlled with PCA pump. Patient OOB to BR with standby assist. Patient passed flatus but no BM. Patient returned to chair to sit OOB for meal. Call bell in reach and safety measures in place.

## 2020-06-23 NOTE — PROGRESS NOTES
"Assumed pt care at 1900 from Day RN. Aware of fall risk status. VS /82   Pulse (!) 102 Comment: RN notified  Temp 36.8 °C (98.3 °F) (Temporal)   Resp 18   Ht 1.829 m (6' 0.01\")   Wt 102.9 kg (226 lb 13.7 oz)   SpO2 93%   BMI 30.76 kg/m² . Assessment complete. Discussed plan of care with patient.    "

## 2020-06-24 ENCOUNTER — APPOINTMENT (OUTPATIENT)
Dept: RADIOLOGY | Facility: MEDICAL CENTER | Age: 65
DRG: 853 | End: 2020-06-24
Attending: SURGERY
Payer: COMMERCIAL

## 2020-06-24 ENCOUNTER — APPOINTMENT (OUTPATIENT)
Dept: RADIOLOGY | Facility: MEDICAL CENTER | Age: 65
DRG: 853 | End: 2020-06-24
Attending: INTERNAL MEDICINE
Payer: COMMERCIAL

## 2020-06-24 PROBLEM — R60.9 EDEMA: Status: ACTIVE | Noted: 2020-06-24

## 2020-06-24 LAB
ANION GAP SERPL CALC-SCNC: 4 MMOL/L (ref 7–16)
ANISOCYTOSIS BLD QL SMEAR: ABNORMAL
BASOPHILS # BLD AUTO: 1.1 % (ref 0–1.8)
BASOPHILS # BLD: 0.15 K/UL (ref 0–0.12)
BUN SERPL-MCNC: 6 MG/DL (ref 8–22)
BURR CELLS BLD QL SMEAR: NORMAL
CALCIUM SERPL-MCNC: 7.9 MG/DL (ref 8.5–10.5)
CHLORIDE SERPL-SCNC: 97 MMOL/L (ref 96–112)
CO2 SERPL-SCNC: 32 MMOL/L (ref 20–33)
COMMENT 1642: NORMAL
CREAT SERPL-MCNC: 0.62 MG/DL (ref 0.5–1.4)
EOSINOPHIL # BLD AUTO: 0.4 K/UL (ref 0–0.51)
EOSINOPHIL NFR BLD: 3.1 % (ref 0–6.9)
ERYTHROCYTE [DISTWIDTH] IN BLOOD BY AUTOMATED COUNT: 54.6 FL (ref 35.9–50)
GLUCOSE SERPL-MCNC: 99 MG/DL (ref 65–99)
HCT VFR BLD AUTO: 27.8 % (ref 42–52)
HGB BLD-MCNC: 8.2 G/DL (ref 14–18)
IMM GRANULOCYTES # BLD AUTO: 0.21 K/UL (ref 0–0.11)
IMM GRANULOCYTES NFR BLD AUTO: 1.6 % (ref 0–0.9)
LYMPHOCYTES # BLD AUTO: 2.21 K/UL (ref 1–4.8)
LYMPHOCYTES NFR BLD: 16.9 % (ref 22–41)
MACROCYTES BLD QL SMEAR: ABNORMAL
MAGNESIUM SERPL-MCNC: 1.6 MG/DL (ref 1.5–2.5)
MCH RBC QN AUTO: 28.2 PG (ref 27–33)
MCHC RBC AUTO-ENTMCNC: 29.5 G/DL (ref 33.7–35.3)
MCV RBC AUTO: 95.5 FL (ref 81.4–97.8)
MICROCYTES BLD QL SMEAR: ABNORMAL
MONOCYTES # BLD AUTO: 1.47 K/UL (ref 0–0.85)
MONOCYTES NFR BLD AUTO: 11.2 % (ref 0–13.4)
MORPHOLOGY BLD-IMP: NORMAL
NEUTROPHILS # BLD AUTO: 8.66 K/UL (ref 1.82–7.42)
NEUTROPHILS NFR BLD: 66.1 % (ref 44–72)
NRBC # BLD AUTO: 0 K/UL
NRBC BLD-RTO: 0 /100 WBC
PLATELET # BLD AUTO: 990 K/UL (ref 164–446)
PLATELET BLD QL SMEAR: NORMAL
PMV BLD AUTO: 9 FL (ref 9–12.9)
POIKILOCYTOSIS BLD QL SMEAR: NORMAL
POLYCHROMASIA BLD QL SMEAR: NORMAL
POTASSIUM SERPL-SCNC: 3.8 MMOL/L (ref 3.6–5.5)
PROCALCITONIN SERPL-MCNC: 0.24 NG/ML
RBC # BLD AUTO: 2.91 M/UL (ref 4.7–6.1)
RBC BLD AUTO: PRESENT
SODIUM SERPL-SCNC: 133 MMOL/L (ref 135–145)
WBC # BLD AUTO: 13.1 K/UL (ref 4.8–10.8)

## 2020-06-24 PROCEDURE — 80048 BASIC METABOLIC PNL TOTAL CA: CPT

## 2020-06-24 PROCEDURE — 700102 HCHG RX REV CODE 250 W/ 637 OVERRIDE(OP): Performed by: INTERNAL MEDICINE

## 2020-06-24 PROCEDURE — 700105 HCHG RX REV CODE 258: Performed by: SURGERY

## 2020-06-24 PROCEDURE — 94760 N-INVAS EAR/PLS OXIMETRY 1: CPT

## 2020-06-24 PROCEDURE — 71045 X-RAY EXAM CHEST 1 VIEW: CPT

## 2020-06-24 PROCEDURE — 770006 HCHG ROOM/CARE - MED/SURG/GYN SEMI*

## 2020-06-24 PROCEDURE — 36415 COLL VENOUS BLD VENIPUNCTURE: CPT

## 2020-06-24 PROCEDURE — 85025 COMPLETE CBC W/AUTO DIFF WBC: CPT

## 2020-06-24 PROCEDURE — 99232 SBSQ HOSP IP/OBS MODERATE 35: CPT | Performed by: INTERNAL MEDICINE

## 2020-06-24 PROCEDURE — 700111 HCHG RX REV CODE 636 W/ 250 OVERRIDE (IP): Performed by: INTERNAL MEDICINE

## 2020-06-24 PROCEDURE — A9270 NON-COVERED ITEM OR SERVICE: HCPCS | Performed by: INTERNAL MEDICINE

## 2020-06-24 PROCEDURE — 83735 ASSAY OF MAGNESIUM: CPT

## 2020-06-24 PROCEDURE — 84145 PROCALCITONIN (PCT): CPT

## 2020-06-24 RX ORDER — FUROSEMIDE 10 MG/ML
40 INJECTION INTRAMUSCULAR; INTRAVENOUS ONCE
Status: COMPLETED | OUTPATIENT
Start: 2020-06-24 | End: 2020-06-24

## 2020-06-24 RX ADMIN — SODIUM CHLORIDE, SODIUM LACTATE, POTASSIUM CHLORIDE, CALCIUM CHLORIDE AND DEXTROSE MONOHYDRATE: 5; 600; 310; 30; 20 INJECTION, SOLUTION INTRAVENOUS at 18:18

## 2020-06-24 RX ADMIN — SODIUM CHLORIDE, SODIUM LACTATE, POTASSIUM CHLORIDE, CALCIUM CHLORIDE AND DEXTROSE MONOHYDRATE: 5; 600; 310; 30; 20 INJECTION, SOLUTION INTRAVENOUS at 05:22

## 2020-06-24 RX ADMIN — FUROSEMIDE 40 MG: 10 INJECTION, SOLUTION INTRAMUSCULAR; INTRAVENOUS at 18:30

## 2020-06-24 RX ADMIN — DOCUSATE SODIUM 50 MG AND SENNOSIDES 8.6 MG 2 TABLET: 8.6; 5 TABLET, FILM COATED ORAL at 05:22

## 2020-06-24 RX ADMIN — OMEPRAZOLE 40 MG: 20 CAPSULE, DELAYED RELEASE ORAL at 05:22

## 2020-06-24 ASSESSMENT — ENCOUNTER SYMPTOMS
SPUTUM PRODUCTION: 0
PALPITATIONS: 0
WEAKNESS: 1
COUGH: 0
PSYCHIATRIC NEGATIVE: 1
ORTHOPNEA: 0
SINUS PAIN: 0
WEIGHT LOSS: 1
ABDOMINAL PAIN: 1
FEVER: 0
CHILLS: 0
EYES NEGATIVE: 1
MYALGIAS: 1
BLOOD IN STOOL: 0

## 2020-06-24 NOTE — PROGRESS NOTES
Bedside report received.  Assessment complete.  A&O x 4. Patient calls appropriately.  Patient mobilizes with stand by assist. Bed alarm n/a.   Patient has 2/10 pain. Sore at chest tube insertion sites, has PCA in place, no further intervention needed at this time.  Denies N&V. Tolerating regular diet.  Surgical site to midline with prevena in place, SMOOTH drain, 2 chest tubes, to L flank and L back.  + void, + flatus, - BM.  Patient denies SOB.  SCD's off.  Review plan with of care with patient. Call light and personal belongings with in reach. Hourly rounding in place. All needs met at this time.

## 2020-06-24 NOTE — DIETARY
Nutrition Services: Brief Update     · RD following pt for adequate PO intake.  · Pt is currently on a Regular, Six small meals diet.  · Per ADL flow sheet, PO has been variable (% for most recent three meals).  · Pt reports a good appetite. Pt declined receiving Boost Plus on meal trays. Pt states he prefers the Premier Protein shake he has been receiving from home.  · Pt will continue to receive preferred snacks BID.    RD will continue to follow.

## 2020-06-24 NOTE — FACE TO FACE
Face to Face Supporting Documentation - Home Health    The encounter with this patient was in whole or in part the primary reason for home health admission.    Date of encounter:   Patient:                    MRN:                       YOB: 2019  Napoleon Zendejas  [id number]  1955     Home health to see patient for:  Skilled Nursing care for assessment, interventions & education    Skilled need for:  Exacerbation of Chronic Disease State gastric lymphoma     Skilled nursing interventions to include:  Venous access care    Homebound status evidenced by:  Need the aid of supportive devices such as crutches, canes, wheelchairs or walkers. Leaving home requires a considerable and taxing effort. There is a normal inability to leave the home.    Community Physician to provide follow up care: Nacho Shea M.D.     Optional Interventions? No      I certify the face to face encounter for this home health care referral meets the CMS requirements and the encounter/clinical assessment with the patient was, in whole, or in part, for the medical condition(s) listed above, which is the primary reason for home health care. Based on my clinical findings: the service(s) are medically necessary, support the need for home health care, and the homebound criteria are met.  I certify that this patient has had a face to face encounter by myself.  Kay Mock M.D. - NPI: [id number]

## 2020-06-24 NOTE — PROGRESS NOTES
Bedside report received.  Assessment complete.  A&O x 4. Patient calls appropriately.  Patient ambulates with stand by assist. Bed alarm off.   Patient has 0/10 pain. Pain managed with PCA.  Denies N&V. Tolerating regular diet.  Surgical midline with prevena wound vac, no leaks at this time. 2 chest tubes on the left side, to water seal.   + void, + flatus, - BM.  Patient denies SOB.  SCD's on.  Review plan with of care with patient. Call light and personal belongings with in reach. Hourly rounding in place. All needs met at this time.

## 2020-06-24 NOTE — THERAPY
Physical Therapy   Initial Evaluation     Patient Name: Napoleon Zendejas  Age:  65 y.o., Sex:  male  Medical Record #: [id number]  Today's Date: 6/23/2020     Precautions: (chest tube x2 )    Assessment  Patient is 65 y.o. male that presented to acute with complaints of bloody stool and emesis. He is s/p L thoracoscopy and decortication of L upper and lower lobes and diaphragm. He presented to PT with pain, impaired balance, functional weakness, and decreased activity tolerance which are limiting his ability to safely perform mobility at OF. He was received and left in chair at bedside, reported he had been sitting up most of the day. He ambulated approximately 350ft with FWW and supervision; he demonstrated increased reliance on BUE and FWW and forward flexed posture. He was able to correct posture with cueing but reverted to flexion with continued ambulation. Anticipate patient will progress well with continued mobilization. Will continue to follow.    Plan  Recommend Physical Therapy for 1-2 more tx for the following treatments:  Bed Mobility, Community Re-integration, Equipment, Gait Training, Manual Therapy, Neuro Re-Education / Balance, Self Care/Home Evaluation, Stair Training, Therapeutic Activities and Therapeutic Exercises  Discharge recommendations:  Recommend home health transitional care for continued physical therapy services. However he may self progress to PLOF with continued mobilization.    Subjective  Agreeable     Objective   06/23/20 9193   Prior Living Situation   Prior Services None   Housing / Facility 1 Story House   Steps Into Home 2   Steps In Home 0   Rail None   Bathroom Set up Bathtub / Shower Combination;Walk In Shower   Equipment Owned None   Lives with - Patient's Self Care Capacity Alone and Able to Care For Self   Comments Patient's daughter Felecia at bedside, patient reported plan to stay at daughter's house for a few days following DC   Prior Level of Functional Mobility   Bed Mobility  Independent   Transfer Status Independent   Ambulation Independent   Distance Ambulation (Feet)   (community)   Assistive Devices Used None   Stairs Independent   Cognition    Cognition / Consciousness WDL   Level of Consciousness Alert   Comments pleasant, cooperative, somewhat flat affect   Balance Assessment   Sitting Balance (Static) Good   Sitting Balance (Dynamic) Good   Standing Balance (Static) Fair   Standing Balance (Dynamic) Fair   Weight Shift Sitting Good   Weight Shift Standing Good   Comments with AD, no LOB   Gait Analysis   Gait Level Of Assist Supervised   Assistive Device Front Wheel Walker   Distance (Feet) 350   # of Times Distance was Traveled 1   Deviation   (decreased joseph, forward flexed posture, increased BUE )   # of Stairs Climbed 2   Level of Assist with Stairs Supervised   Comments step to gait with rail on stairs   Bed Mobility    Supine to Sit   (NT, in chair pre and post )   Sit to Supine   (returned to chair)   Scooting Supervised  (seated )   Functional Mobility   Sit to Stand Supervised   Bed, Chair, Wheelchair Transfer Supervised   Toilet Transfers Supervised   Transfer Method Stand Step   Patient / Family Goals    Patient / Family Goal #1 go home   Short Term Goals    Short Term Goal # 1 Patient will be able to perform bed mobility without bed features with supervision within 6tx in order to get in/out of bed   Short Term Goal # 2 Patient will ambulate > 500ft without AD with supervision within 6tx in order to access environment at [facility]

## 2020-06-24 NOTE — RESPIRATORY CARE
Oxygen Rounds      Patient found on    O2 L/m:  0.5  Oxygen device:  ____snc____   Spo2: 94%        Respiratory device skin site inspection completed.

## 2020-06-24 NOTE — THERAPY
"Occupational Therapy   Initial Evaluation     Patient Name: Napoleon Zendejas  Age:  65 y.o., Sex:  male  Medical Record #: [id number]  Today's Date: 6/23/2020     Precautions  Comments: 2 chest tubes    Assessment  Patient is 65 y.o. male admitted with bloody stools, pmhx includes HTN and perforated gastric cancer. Long acute admission with multiple surgeries including sleeve gastrectomy, splenectomy and partial LEFT diaphragm resection on 6/11 and left posterolateral thoracoscopy, left pulmonary decortication, Decortication of left upper lobe, left lower lobe and diaphragm on 6/21. Pt demonstrated basic ADLs and ADL transfers including LB dressing, toilet transfer, standing tolerance for ADLs at SPV level. Anticipate improved functional presentation following DC of chest tubes x2. Pt supportive daughter present, receptive to recommendation to install shower chair. OT will remain available for DC needs only.     Plan    Recommend Occupational Therapy for Evaluation only Patient will not be actively followed for occupational therapy services at this time, however may be seen if requested by physician for 1 more visit within 30 days to address any discharge or equipment needs.     Discharge recommendations: Recommend home health for continued occupational therapy services.       Subjective    \"We decided it'll be better if I go to my daughters for the first while after I leave here.\"     06/23/20 1557   Prior Living Situation   Prior Services None   Housing / Facility 1 Story House   Steps Into Home 2   Rail None   Bathroom Set up Bathtub / Shower Combination;Walk In Shower   Equipment Owned None   Lives with - Patient's Self Care Capacity Alone and Able to Care For Self   Comments Patient's daughter Felecia at bedside, patient reported plan to stay at daughter's house for a few days following DC   Prior Level of ADL Function   Comments independent   Prior Level of IADL Function   Prior Level Of Mobility Independent Without " Device in Community;Independent Without Device in Home   Driving / Transportation Driving Independent   Precautions   Precautions   (chest tube x2 )   Comments 2 chest tubes   Pain 0 - 10 Group   Therapist Pain Assessment During Activity;Post Activity Pain Same as Prior to Activity;Nurse Notified   Balance Assessment   Weight Shift Sitting Good   Weight Shift Standing Good   Comments with AD, no LOB   ADL Assessment   Eating Modified Independent   Grooming Supervision;Standing   Upper Body Dressing Supervision   Lower Body Dressing Supervision   Toileting Supervision   Comments demonstrated toilet txf, anticipate no deficit in toileting self-care   Functional Mobility   Sit to Stand Supervised   Bed, Chair, Wheelchair Transfer Supervised   Toilet Transfers Supervised   Transfer Method Stand Step   Mobility with FWW   Comments ease of funt mob limited by multiple IV lines and chest tube x2   Problem List   Problem List None   Anticipated Discharge Equipment   DC Equipment Tub / Shower Seat  (daughter plans to obtain)

## 2020-06-24 NOTE — PROGRESS NOTES
"    DATE: 6/24/2020    Post Operative Day  3 Left video-assisted thoracoscopy and day 11 sleeve gastrectomy, splenectomy and partial resection of diaphragm.    Interval Events:  Chest tubes removed. SMOOTH drain removed..    PHYSICAL EXAMINATION:  Vital Signs: /78   Pulse (!) 104   Temp 37.2 °C (99 °F) (Temporal)   Resp 18   Ht 1.829 m (6' 0.01\")   Wt 102.9 kg (226 lb 13.7 oz)   SpO2 93%     Abdomen soft. Preveena.    Laboratory Values:   Recent Labs     06/22/20  0535 06/23/20  0642 06/24/20  0451   WBC 16.8* 15.6* 13.1*   RBC 3.34* 3.16* 2.91*   HEMOGLOBIN 9.5* 8.9* 8.2*   HEMATOCRIT 30.8* 29.1* 27.8*   MCV 92.2 92.1 95.5   MCH 28.4 28.2 28.2   MCHC 30.8* 30.6* 29.5*   RDW 51.1* 52.5* 54.6*   PLATELETCT 1041* 1003* 990*   MPV 8.6* 8.6* 9.0     Recent Labs     06/22/20  0535 06/23/20  0642 06/24/20  0451   SODIUM 134* 134* 133*   POTASSIUM 4.2 3.9 3.8   CHLORIDE 96 97 97   CO2 31 30 32   GLUCOSE 104* 98 99   BUN 3* 5* 6*   CREATININE 0.56 0.52 0.62   CALCIUM 8.2* 8.0* 7.9*                Imaging:   DX-CHEST-PORTABLE (1 VIEW)   Final Result      No significant interval change.      DX-CHEST-PORTABLE (1 VIEW)   Final Result      1.  Stable left-sided chest tubes. Stable small to moderate left pleural effusion.   2.  Stable left sided atelectasis versus consolidations.   3.  Minimal atelectasis in the right lung.      DX-CHEST-PORTABLE (1 VIEW)   Final Result         1. No significant interval change.         DX-CHEST-LIMITED (1 VIEW)   Final Result      1.  Interval placement of 2nd left chest tube and repositioning of more inferior chest tube with significant evacuation of left pleural effusion      2.  Left airspace process most consistent with atelectasis      DX-CHEST-PORTABLE (1 VIEW)   Final Result         1. No significant interval change.      DX-CHEST-PORTABLE (1 VIEW)   Final Result      1.  Negative for pneumothorax after thoracentesis      2.  Left pleural effusion which appears radiographically " large but by ultrasound was loculated      3.  Left chest tube present      US-THORACENTESIS PUNCTURE LEFT   Final Result      1. Ultrasound guided left sided therapeutic thoracentesis.      2. 110 mL of fluid withdrawn.      3. On sonographic evaluation the left pleural fluid appears to be markedly loculated and the left hemidiaphragm elevated. Only a small amount of fluid could be obtained probably due to loculation. These findings were discussed with Dr. Bernabe and    recommendation for CT scan with contrast was made to more accurately determine the degree of loculation, positioned left chest tube, and for potential procedural planning      Findings were discussed with ASIA BERNABE on 6/20/2020 7:40 PM.      DX-CHEST-PORTABLE (1 VIEW)   Final Result         1. No significant interval change.      IR-US GUIDED PIV   Final Result    Ultrasound-guided PERIPHERAL IV INSERTION performed by    qualified nursing staff as above.      DX-CHEST-PORTABLE (1 VIEW)   Final Result         1.  Pulmonary infiltrate and layering pleural effusion, stable since prior study. Thoracostomy tube remains in place         US-EXTREMITY VENOUS UPPER UNILAT LEFT   Final Result      DX-CHEST-PORTABLE (1 VIEW)   Final Result         1.  Pulmonary infiltrate and layering pleural effusion, stable since prior study. Thoracostomy tube remains in place      DX-UPPER GI-SERIES WITH KUB   Final Result      Status post sleeve gastrectomy. No evidence of anastomotic leak.      DX-CHEST-LIMITED (1 VIEW)   Final Result         1.  Pulmonary infiltrate and layering pleural effusion, new since prior study. Thoracostomy tube remains in place      DX-CHEST-LIMITED (1 VIEW)   Final Result      Trace left pneumothorax. Left-sided chest tubes.      Blunting of the right costophrenic angle may be related to a trace pleural effusion.      Mild bibasilar atelectasis.         CT-ABDOMEN-PELVIS WITH   Final Result         1.  Large heterogeneous masslike hypodensity  within the spleen which contains air and nonspecific speckled areas of increased density. This is contiguous with the greater curvature of the stomach with associated gastric wall thickening and with air    likely tracking from the stomach into this splenic abnormality. Findings are most concerning for underlying malignancy with air in the splenic mass raising concern for possible superinfection. Endoscopic evaluation and biopsy should be considered.   2.  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 Additional shoddy and mildly prominent retroperitoneal lymph nodes.      These findings were discussed with JACQUELIN POWERS on 6/11/2020 6:30 PM.                  DX-CHEST-PORTABLE (1 VIEW)   Final Result      1.  There is no acute cardiopulmonary process.      EC-ECHOCARDIOGRAM COMPLETE W/O CONT    (Results Pending)       ASSESSMENT AND PLAN:     Continued progress.  Recommend transitioning to oral analgesics and stop PCA.  Advance diet as tolerated.  Mobilize.       ____________________________________     Mil Garcia M.D.

## 2020-06-24 NOTE — CARE PLAN
Problem: Nutritional:  Goal: Achieve adequate nutritional intake  Description: Patient will consume 50% of meals  Outcome: PROGRESSING AS EXPECTED    PO 25-75% of most recent three meals.

## 2020-06-24 NOTE — CARE PLAN
Problem: Safety  Goal: Will remain free from injury  Outcome: PROGRESSING AS EXPECTED  Patient educated to call before mobilizing, call light and belongings within reach, bed locked and in lowest position.       Problem: Bowel/Gastric:  Goal: Normal bowel function is maintained or improved  Outcome: NOT MET  Patient hasn't had bowel movement since prior to 6/11/20, MD's aware.     Problem: Pain Management  Goal: Pain level will decrease to patient's comfort goal  Outcome: PROGRESSING AS EXPECTED  Patient has PCA, pain controlled at this time.      Called pt and advised

## 2020-06-24 NOTE — PROGRESS NOTES
Per radiology, they don't do bone marrow biopsies and that hospitalist does. Notified the hospitalist who says he will contact the person who is doing bone marrow biopsies today.

## 2020-06-24 NOTE — PROGRESS NOTES
[facility] Medicine Daily Progress Note    Date of Service  6/24/2020    Chief Complaint  65 y.o. male admitted 6/11/2020 with Bloody stools    Hospital Course   65 y.o. male has a PMHx of HTN, perforated gastric cancer, who presented on 6/11/2020 with dark stools.  Morning of admission the patient presented to his primary care provider and was told that he had an abnormal CBC.  Stool sample was positive for blood.  Patient does report fatigue and dark bloody stools.  He then developed lightheadedness and an episode of hematemesis.  He does report vague abdominal pain, early satiety, and decreased appetite for weeks. Denies heavy alcohol and no blood thinners.  This led to his presentation to the emergency room.  Patient was admitted with sepsis secondary to abdominal infection.  Started on ceftriaxone and metronidazole.  CT abdomen was also concerning for gastric mass.  GI was consulted Dr. Ray.  The patient was started on a pantoprazole drip.  EGD on 6/12 demonstrated a large cratered stomach ulcer in the fundus/cardia area status post cold forceps biopsy.  The patient underwent  exploratory celiotomy with a sleeve gastrectomy and splenectomy with partial resection of the left hemidiaphragm by Dr. 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   The patient developed left pleural effusion and he underwent left pulmonary decortication and chest tube        Interval Problem Update  -Evaluated and examined the patient at bedside, no fever last night and no events.  -Labs are reviewed, improving on leukocytosis  -Lower extremity edema, echo is pending and continue Lasix  -PT and OT recommended home health; however the patient refused home health.   -Chest tube still in place      Consultants/Specialty  -General Surgery - Dr. Moura/    -Gastroenterology - Dr. Ray  -Oncology-      Code  Status  FULL    Disposition  Home with home health after surgical clearance    Review of Systems  Review of Systems   Constitutional: Positive for malaise/fatigue and weight loss. Negative for chills and fever.   HENT: Negative for congestion, ear pain and sinus pain.    Eyes: Negative.    Respiratory: Negative for cough and sputum production.    Cardiovascular: Negative for chest pain, palpitations and orthopnea.   Gastrointestinal: Positive for abdominal pain. Negative for blood in stool and melena.   Genitourinary: Negative.    Musculoskeletal: Positive for myalgias.   Skin: Negative.    Neurological: Positive for weakness.   Psychiatric/Behavioral: Negative.    All other systems reviewed and are negative.       Physical Exam  Temp:  [36.1 °C (97 °F)-37.7 °C (99.8 °F)] 37.7 °C (99.8 °F)  Pulse:  [] 104  Resp:  [17-19] 18  BP: (117-132)/(76-87) 127/78  SpO2:  [91 %-98 %] 96 %    Physical Exam  Vitals signs and nursing note reviewed.   HENT:      Head: Normocephalic.      Nose: Nose normal.      Mouth/Throat:      Mouth: Mucous membranes are moist.      Pharynx: Oropharynx is clear.   Eyes:      General: No scleral icterus.     Pupils: Pupils are equal, round, and reactive to light.   Neck:      Musculoskeletal: Normal range of motion.   Cardiovascular:      Rate and Rhythm: Normal rate and regular rhythm.      Pulses: Normal pulses.      Heart sounds: Normal heart sounds.   Pulmonary:      Effort: Pulmonary effort is normal.      Breath sounds: No wheezing, rhonchi or rales.      Comments: Chest tube in place  Abdominal:      General: Bowel sounds are normal.      Palpations: Abdomen is soft.      Tenderness: There is abdominal tenderness. There is guarding.      Comments: S/p sleeve gastrectomy, splenectomy and partial LEFT diaphragm resection   Musculoskeletal: Normal range of motion.         General: Swelling present. No tenderness.      Right lower leg: Edema present.      Left lower leg: Edema present.    Skin:     General: Skin is warm.      Capillary Refill: Capillary refill takes 2 to 3 seconds.   Neurological:      General: No focal deficit present.      Mental Status: He is alert. Mental status is at baseline.      Cranial Nerves: No cranial nerve deficit.      Motor: No weakness.   Psychiatric:         Mood and Affect: Mood normal.         Fluids    Intake/Output Summary (Last 24 hours) at 6/24/2020 1817  Last data filed at 6/24/2020 1600  Gross per 24 hour   Intake 2107.78 ml   Output 886 ml   Net 1221.78 ml       Laboratory  Recent Labs     06/22/20  0535 06/23/20  0642 06/24/20  0451   WBC 16.8* 15.6* 13.1*   RBC 3.34* 3.16* 2.91*   HEMOGLOBIN 9.5* 8.9* 8.2*   HEMATOCRIT 30.8* 29.1* 27.8*   MCV 92.2 92.1 95.5   MCH 28.4 28.2 28.2   MCHC 30.8* 30.6* 29.5*   RDW 51.1* 52.5* 54.6*   PLATELETCT 1041* 1003* 990*   MPV 8.6* 8.6* 9.0     Recent Labs     06/22/20  0535 06/23/20  0642 06/24/20  0451   SODIUM 134* 134* 133*   POTASSIUM 4.2 3.9 3.8   CHLORIDE 96 97 97   CO2 31 30 32   GLUCOSE 104* 98 99   BUN 3* 5* 6*   CREATININE 0.56 0.52 0.62   CALCIUM 8.2* 8.0* 7.9*                      Assessment/Plan  * Gastric lymphoma (HCC)- (present on admission)  Assessment & Plan  Came with GI bleeding and found stomach ulcer  Pathology showed high-grade B lymphoma involved stomach and spleen  Sleeve gastrectomy, splenectomy and partial LEFT diaphragm resection was done by Dr. Zeny James consulted; PET scan, bone marrow study, and echo and follow-up as outpatient    Edema  Assessment & Plan  Lower extremity edema  Creatinine is stable  Echo is pending  Ultrasound to rule out DVT since the patient was not receiving DVT prophylaxis due to GI bleeding.  Start with Lovenox since his hemoglobin is stable  Lasix    Pleural effusion on left  Assessment & Plan  Pt continues to have pleural effusion, no fluid study however likely related to lymphoma  Has chest tubes in, also had thoracentesis on 6/20 but not much  drained  Looks like loculated, Left posterolateral thoracoscopy. Left pulmonary decortication, Decortication of left upper lobe, left lower lobe and diaphragm on 6/21  Chest tube in place, continue monitoring with surgery team    S/P splenectomy  Assessment & Plan  -6/13- sleeve gastrectomy, splenectomy and partial LEFT diaphragm resection  Lymphoma    Sepsis (HCC)- (present on admission)  Assessment & Plan  -This is Sepsis Present on admission  SIRS criteria identified on my evaluation include: Tachycardia, with heart rate greater than 90 BPM and Leukocytosis, with WBC greater than 12,000  -Source is intra-abdominal  -Sepsis protocol initiated  -Fluid resuscitation ordered per protocol  -IV antibiotics as appropriate for source of sepsis  -While organ dysfunction may be noted elsewhere in this problem list or in the chart, degree of organ dysfunction does not meet CMS -criteria for severe sepsis  Reassessment  Hemodynamically stable and improving on leukocytosis and no fever  Blood culture is negative  Continue monitoring without antibiotics    Acute blood loss anemia- (present on admission)  Assessment & Plan  -Likely from GI bleed complicated by gastric mass  -Patient is hemodynamically stable and asymptomatic  -Will continue to trend with CBC  -Transfuse to maintain hemoglobin greater than 7.  -Status post 1 unit of packed blood cells on 6/12/2020 and 6/13/2021      GI bleed- (present on admission)  Assessment & Plan  Came with upper GI bleeding and hemoglobin dropped with dark stool  EGD: Ulcer with adherent clot as noted above.  Underwent surgery  Stable  Continue oral PPI  Labs daily      Left arm swelling  Assessment & Plan  US doppler neg for DVT  Shows superficial thrombosis      Hyperglycemia- (present on admission)  Assessment & Plan  -Mild, no need for coverage    Hyponatremia- (present on admission)  Assessment & Plan  -Improving  -Likely due to dehydration  -Start IV fluids  -Repeat BMP in the morning  - continue to monitor    Thrombocytosis (HCC)- (present on admission)  Assessment & Plan  -Likely due to anemia, lymphoma  -Repeat CBC in the morning       VTE prophylaxis: SCDs, lovenox     I have performed a physical exam and reviewed and updated ROS and Plan today (6/24/2020). In review of yesterday's note (6/23/2020), there are no changes except as documented above.

## 2020-06-25 ENCOUNTER — APPOINTMENT (OUTPATIENT)
Dept: RADIOLOGY | Facility: MEDICAL CENTER | Age: 65
DRG: 853 | End: 2020-06-25
Attending: INTERNAL MEDICINE
Payer: COMMERCIAL

## 2020-06-25 ENCOUNTER — APPOINTMENT (OUTPATIENT)
Dept: CARDIOLOGY | Facility: MEDICAL CENTER | Age: 65
DRG: 853 | End: 2020-06-25
Attending: INTERNAL MEDICINE
Payer: COMMERCIAL

## 2020-06-25 ENCOUNTER — APPOINTMENT (OUTPATIENT)
Dept: RADIOLOGY | Facility: MEDICAL CENTER | Age: 65
DRG: 853 | End: 2020-06-25
Attending: SURGERY
Payer: COMMERCIAL

## 2020-06-25 LAB
ALBUMIN SERPL BCP-MCNC: 2 G/DL (ref 3.2–4.9)
ALBUMIN/GLOB SERPL: 0.6 G/DL
ALP SERPL-CCNC: 83 U/L (ref 30–99)
ALT SERPL-CCNC: 17 U/L (ref 2–50)
ANION GAP SERPL CALC-SCNC: 6 MMOL/L (ref 7–16)
AST SERPL-CCNC: 26 U/L (ref 12–45)
BASOPHILS # BLD AUTO: 1 % (ref 0–1.8)
BASOPHILS # BLD: 0.13 K/UL (ref 0–0.12)
BILIRUB SERPL-MCNC: 0.2 MG/DL (ref 0.1–1.5)
BUN SERPL-MCNC: 6 MG/DL (ref 8–22)
CALCIUM SERPL-MCNC: 8 MG/DL (ref 8.5–10.5)
CHLORIDE SERPL-SCNC: 96 MMOL/L (ref 96–112)
CO2 SERPL-SCNC: 31 MMOL/L (ref 20–33)
CREAT SERPL-MCNC: 0.52 MG/DL (ref 0.5–1.4)
EOSINOPHIL # BLD AUTO: 0.52 K/UL (ref 0–0.51)
EOSINOPHIL NFR BLD: 4.2 % (ref 0–6.9)
ERYTHROCYTE [DISTWIDTH] IN BLOOD BY AUTOMATED COUNT: 51.9 FL (ref 35.9–50)
GLOBULIN SER CALC-MCNC: 3.2 G/DL (ref 1.9–3.5)
GLUCOSE SERPL-MCNC: 99 MG/DL (ref 65–99)
HCT VFR BLD AUTO: 26.9 % (ref 42–52)
HGB BLD-MCNC: 8.4 G/DL (ref 14–18)
IMM GRANULOCYTES # BLD AUTO: 0.16 K/UL (ref 0–0.11)
IMM GRANULOCYTES NFR BLD AUTO: 1.3 % (ref 0–0.9)
LV EJECT FRACT  99904: 70
LV EJECT FRACT MOD 2C 99903: 75.16
LV EJECT FRACT MOD 4C 99902: 69.24
LV EJECT FRACT MOD BP 99901: 70.39
LYMPHOCYTES # BLD AUTO: 2.62 K/UL (ref 1–4.8)
LYMPHOCYTES NFR BLD: 20.9 % (ref 22–41)
MAGNESIUM SERPL-MCNC: 1.6 MG/DL (ref 1.5–2.5)
MCH RBC QN AUTO: 28.6 PG (ref 27–33)
MCHC RBC AUTO-ENTMCNC: 31.2 G/DL (ref 33.7–35.3)
MCV RBC AUTO: 91.5 FL (ref 81.4–97.8)
MONOCYTES # BLD AUTO: 1.15 K/UL (ref 0–0.85)
MONOCYTES NFR BLD AUTO: 9.2 % (ref 0–13.4)
NEUTROPHILS # BLD AUTO: 7.93 K/UL (ref 1.82–7.42)
NEUTROPHILS NFR BLD: 63.4 % (ref 44–72)
NRBC # BLD AUTO: 0 K/UL
NRBC BLD-RTO: 0 /100 WBC
PLATELET # BLD AUTO: 959 K/UL (ref 164–446)
PMV BLD AUTO: 8.6 FL (ref 9–12.9)
POTASSIUM SERPL-SCNC: 3.6 MMOL/L (ref 3.6–5.5)
PROT SERPL-MCNC: 5.2 G/DL (ref 6–8.2)
RBC # BLD AUTO: 2.94 M/UL (ref 4.7–6.1)
SODIUM SERPL-SCNC: 133 MMOL/L (ref 135–145)
WBC # BLD AUTO: 12.5 K/UL (ref 4.8–10.8)

## 2020-06-25 PROCEDURE — 770006 HCHG ROOM/CARE - MED/SURG/GYN SEMI*

## 2020-06-25 PROCEDURE — 85025 COMPLETE CBC W/AUTO DIFF WBC: CPT

## 2020-06-25 PROCEDURE — 700102 HCHG RX REV CODE 250 W/ 637 OVERRIDE(OP): Performed by: INTERNAL MEDICINE

## 2020-06-25 PROCEDURE — 700111 HCHG RX REV CODE 636 W/ 250 OVERRIDE (IP): Performed by: INTERNAL MEDICINE

## 2020-06-25 PROCEDURE — A9270 NON-COVERED ITEM OR SERVICE: HCPCS | Performed by: SURGERY

## 2020-06-25 PROCEDURE — 36415 COLL VENOUS BLD VENIPUNCTURE: CPT

## 2020-06-25 PROCEDURE — 93306 TTE W/DOPPLER COMPLETE: CPT | Mod: 26 | Performed by: INTERNAL MEDICINE

## 2020-06-25 PROCEDURE — A9270 NON-COVERED ITEM OR SERVICE: HCPCS | Performed by: INTERNAL MEDICINE

## 2020-06-25 PROCEDURE — 99232 SBSQ HOSP IP/OBS MODERATE 35: CPT | Performed by: INTERNAL MEDICINE

## 2020-06-25 PROCEDURE — 94760 N-INVAS EAR/PLS OXIMETRY 1: CPT

## 2020-06-25 PROCEDURE — 83735 ASSAY OF MAGNESIUM: CPT

## 2020-06-25 PROCEDURE — 80053 COMPREHEN METABOLIC PANEL: CPT

## 2020-06-25 PROCEDURE — 93306 TTE W/DOPPLER COMPLETE: CPT

## 2020-06-25 PROCEDURE — 700102 HCHG RX REV CODE 250 W/ 637 OVERRIDE(OP): Performed by: SURGERY

## 2020-06-25 PROCEDURE — 71045 X-RAY EXAM CHEST 1 VIEW: CPT

## 2020-06-25 RX ORDER — FUROSEMIDE 10 MG/ML
40 INJECTION INTRAMUSCULAR; INTRAVENOUS
Status: DISCONTINUED | OUTPATIENT
Start: 2020-06-25 | End: 2020-06-26 | Stop reason: HOSPADM

## 2020-06-25 RX ORDER — OXYCODONE HYDROCHLORIDE 10 MG/1
10 TABLET ORAL EVERY 6 HOURS PRN
Status: DISCONTINUED | OUTPATIENT
Start: 2020-06-25 | End: 2020-06-26 | Stop reason: HOSPADM

## 2020-06-25 RX ORDER — OXYCODONE HYDROCHLORIDE 5 MG/1
5 TABLET ORAL EVERY 4 HOURS PRN
Status: DISCONTINUED | OUTPATIENT
Start: 2020-06-25 | End: 2020-06-26 | Stop reason: HOSPADM

## 2020-06-25 RX ADMIN — ENOXAPARIN SODIUM 40 MG: 40 INJECTION SUBCUTANEOUS at 05:22

## 2020-06-25 RX ADMIN — OXYCODONE HYDROCHLORIDE 5 MG: 5 SOLUTION ORAL at 21:05

## 2020-06-25 RX ADMIN — OMEPRAZOLE 40 MG: 20 CAPSULE, DELAYED RELEASE ORAL at 05:23

## 2020-06-25 RX ADMIN — FUROSEMIDE 40 MG: 10 INJECTION, SOLUTION INTRAMUSCULAR; INTRAVENOUS at 16:27

## 2020-06-25 RX ADMIN — DOCUSATE SODIUM 50 MG AND SENNOSIDES 8.6 MG 2 TABLET: 8.6; 5 TABLET, FILM COATED ORAL at 05:23

## 2020-06-25 RX ADMIN — Medication 400 MG: at 05:23

## 2020-06-25 ASSESSMENT — ENCOUNTER SYMPTOMS
ORTHOPNEA: 0
BLOOD IN STOOL: 0
SPUTUM PRODUCTION: 0
SINUS PAIN: 0
TREMORS: 0
FEVER: 0
FOCAL WEAKNESS: 0
SENSORY CHANGE: 0
EYES NEGATIVE: 1
WEIGHT LOSS: 1
CHILLS: 0
WEAKNESS: 0
PALPITATIONS: 0
ABDOMINAL PAIN: 0
SPEECH CHANGE: 0
COUGH: 0
PSYCHIATRIC NEGATIVE: 1
HEADACHES: 0
MYALGIAS: 1

## 2020-06-25 NOTE — ASSESSMENT & PLAN NOTE
Lower extremity edema  Creatinine is stable  Echo is pending  Ultrasound to rule out DVT since the patient was not receiving DVT prophylaxis due to GI bleeding.  Start with Lovenox since his hemoglobin is stable  Lasix

## 2020-06-25 NOTE — PROGRESS NOTES
Discontinued PCA per order. Patient educated on medication replacing PCA and to call if he needs it.

## 2020-06-25 NOTE — DISCHARGE PLANNING
"RN CM discussed pt POC with Shazia, pt's daughter. Shazia states she is not aware of pt's level of income. Shazia states it is her understanding the pt will decline paying out of pocket for care givers, because \"he does not trust them.\" Pt was sleeping today when RN CM attempted to discuss POC. RN CM will f/u with pt, family, and medical team for DC support.   "

## 2020-06-25 NOTE — RESPIRATORY CARE
Oxygen Rounds      Patient found on    O2 L/m:  0  Oxygen device:  Room air  Spo2: 93%        Respiratory device skin site inspection completed.

## 2020-06-25 NOTE — PROGRESS NOTES
[facility] Medicine Daily Progress Note    Date of Service  6/25/2020    Chief Complaint  65 y.o. male admitted 6/11/2020 with Bloody stools    Hospital Course   65 y.o. male has a PMHx of HTN, perforated gastric cancer, who presented on 6/11/2020 with dark stools.  Morning of admission the patient presented to his primary care provider and was told that he had an abnormal CBC.  Stool sample was positive for blood.  Patient does report fatigue and dark bloody stools.  He then developed lightheadedness and an episode of hematemesis.He does report vague abdominal pain, early satiety, and decreased appetite for weeks. Denies heavy alcohol and no blood thinners. Patient was admitted with sepsis secondary to abdominal infection.  Started on ceftriaxone and metronidazole.  CT abdomen was also concerning for gastric mass.  GI was consulted Dr. Ray.  The patient was started on a pantoprazole drip.    Antibiotics was held, EGD on 6/12 demonstrated a large cratered stomach ulcer in the fundus/cardia area status post cold forceps biopsy.  The patient underwent  exploratory celiotomy with a sleeve gastrectomy and splenectomy with partial resection of the left hemidiaphragm by Dr. 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     The patient has lower extremity edema, echo did not show any abnormalities.     The patient developed left pleural effusion and he underwent left pulmonary decortication and chest tube, and the tube was removed later, no study for the pleural fluid was done.         Interval Problem Update  -Evaluated and examined the patient at bedside, no fever last night and no events.  -Labs are reviewed, improving on leukocytosis, hemoglobin has been stable  -Lower extremity edema, echo is normal, continue Lasix  -PT and OT recommended home health; however the patient refused home health.   -PCA was  discontinued  -Chest tube was removed.       Consultants/Specialty  -General Surgery - Dr. Moura/    -Gastroenterology - Dr. Smithh  -Oncology-      Code Status  FULL    Disposition  Home with home health after surgical clearance    Review of Systems  Review of Systems   Constitutional: Positive for malaise/fatigue and weight loss. Negative for chills and fever.   HENT: Negative for congestion, ear pain and sinus pain.    Eyes: Negative.    Respiratory: Negative for cough and sputum production.    Cardiovascular: Negative for chest pain, palpitations and orthopnea.   Gastrointestinal: Negative for abdominal pain, blood in stool and melena.   Genitourinary: Negative.    Musculoskeletal: Positive for myalgias.   Skin: Negative.    Neurological: Negative for tremors, sensory change, speech change, focal weakness, weakness and headaches.   Psychiatric/Behavioral: Negative.    All other systems reviewed and are negative.       Physical Exam  Temp:  [36.8 °C (98.3 °F)-37.7 °C (99.8 °F)] 37.4 °C (99.4 °F)  Pulse:  [] 95  Resp:  [16-18] 18  BP: (109-127)/(64-80) 117/64  SpO2:  [90 %-96 %] 93 %    Physical Exam  Vitals signs and nursing note reviewed.   HENT:      Head: Normocephalic.      Nose: Nose normal.      Mouth/Throat:      Mouth: Mucous membranes are moist.      Pharynx: Oropharynx is clear.   Eyes:      General: No scleral icterus.     Pupils: Pupils are equal, round, and reactive to light.   Neck:      Musculoskeletal: Normal range of motion.   Cardiovascular:      Rate and Rhythm: Normal rate and regular rhythm.      Pulses: Normal pulses.      Heart sounds: Normal heart sounds.   Pulmonary:      Effort: Pulmonary effort is normal. No respiratory distress.      Breath sounds: No wheezing, rhonchi or rales.      Comments: Chest tube in place  Abdominal:      General: Bowel sounds are normal.      Palpations: Abdomen is soft.      Tenderness: There is abdominal tenderness. There is guarding.       Comments: S/p sleeve gastrectomy, splenectomy and partial LEFT diaphragm resection   Musculoskeletal: Normal range of motion.         General: Swelling present. No tenderness.      Right lower leg: Edema present.      Left lower leg: Edema present.   Skin:     General: Skin is warm.      Capillary Refill: Capillary refill takes 2 to 3 seconds.   Neurological:      General: No focal deficit present.      Mental Status: He is alert. Mental status is at baseline.      Cranial Nerves: No cranial nerve deficit.      Motor: No weakness.   Psychiatric:         Mood and Affect: Mood normal.         Fluids    Intake/Output Summary (Last 24 hours) at 6/25/2020 1521  Last data filed at 6/25/2020 0750  Gross per 24 hour   Intake 1065.67 ml   Output 2200 ml   Net -1134.33 ml       Laboratory  Recent Labs     06/23/20  0642 06/24/20  0451 06/25/20  0450   WBC 15.6* 13.1* 12.5*   RBC 3.16* 2.91* 2.94*   HEMOGLOBIN 8.9* 8.2* 8.4*   HEMATOCRIT 29.1* 27.8* 26.9*   MCV 92.1 95.5 91.5   MCH 28.2 28.2 28.6   MCHC 30.6* 29.5* 31.2*   RDW 52.5* 54.6* 51.9*   PLATELETCT 1003* 990* 959*   MPV 8.6* 9.0 8.6*     Recent Labs     06/23/20  0642 06/24/20  0451 06/25/20  0450   SODIUM 134* 133* 133*   POTASSIUM 3.9 3.8 3.6   CHLORIDE 97 97 96   CO2 30 32 31   GLUCOSE 98 99 99   BUN 5* 6* 6*   CREATININE 0.52 0.62 0.52   CALCIUM 8.0* 7.9* 8.0*                      Assessment/Plan  * Gastric lymphoma (HCC)- (present on admission)  Assessment & Plan  Came with GI bleeding and found stomach ulcer  Pathology showed high-grade B lymphoma involved stomach and spleen  Sleeve gastrectomy, splenectomy and partial LEFT diaphragm resection was done by Dr. Zeny James consulted; PET scan, bone marrow study, and echo and follow-up as outpatient    Edema  Assessment & Plan  Lower extremity edema  Creatinine is stable  Echo is pending  Ultrasound to rule out DVT since the patient was not receiving DVT prophylaxis due to GI bleeding.  Start with Lovenox  since his hemoglobin is stable  Lasix    Pleural effusion on left  Assessment & Plan  Pt continues to have pleural effusion, no fluid study however likely related to lymphoma  Has chest tubes in, also had thoracentesis on 6/20 but not much drained  Looks like loculated, Left posterolateral thoracoscopy. Left pulmonary decortication, Decortication of left upper lobe, left lower lobe and diaphragm on 6/21  Chest tube in place, continue monitoring with surgery team    S/P splenectomy  Assessment & Plan  -6/13- sleeve gastrectomy, splenectomy and partial LEFT diaphragm resection  Lymphoma    Sepsis (HCC)- (present on admission)  Assessment & Plan  -This is Sepsis Present on admission  SIRS criteria identified on my evaluation include: Tachycardia, with heart rate greater than 90 BPM and Leukocytosis, with WBC greater than 12,000  -Source is intra-abdominal  -Sepsis protocol initiated  -Fluid resuscitation ordered per protocol  -IV antibiotics as appropriate for source of sepsis  -While organ dysfunction may be noted elsewhere in this problem list or in the chart, degree of organ dysfunction does not meet CMS -criteria for severe sepsis  Reassessment  Hemodynamically stable and improving on leukocytosis and no fever  Blood culture is negative  Continue monitoring without antibiotics    Acute blood loss anemia- (present on admission)  Assessment & Plan  -Likely from GI bleed complicated by gastric mass  -Patient is hemodynamically stable and asymptomatic  -Will continue to trend with CBC  -Transfuse to maintain hemoglobin greater than 7.  -Status post 1 unit of packed blood cells on 6/12/2020 and 6/13/2021      GI bleed- (present on admission)  Assessment & Plan  Came with upper GI bleeding and hemoglobin dropped with dark stool  EGD: Ulcer with adherent clot as noted above.  Underwent surgery  Stable  Continue oral PPI  Labs daily      Left arm swelling  Assessment & Plan  US doppler neg for DVT  Shows superficial  thrombosis      Hyperglycemia- (present on admission)  Assessment & Plan  -Mild, no need for coverage    Hyponatremia- (present on admission)  Assessment & Plan  -Improving  -Likely due to dehydration  -Start IV fluids  -Repeat BMP in the morning - continue to monitor    Thrombocytosis (HCC)- (present on admission)  Assessment & Plan  -Likely due to anemia, lymphoma  -Repeat CBC in the morning       VTE prophylaxis: SCDs, lovenox     I have performed a physical exam and reviewed and updated ROS and Plan today (6/25/2020). In review of yesterday's note (6/24/2020), there are no changes except as documented above.

## 2020-06-25 NOTE — PROGRESS NOTES
Bedside report received.  Assessment complete.  A&O x 4. Patient calls appropriately.  Patient ambulates with stand by assist. Bed alarm off.   Patient has 1/10 pain. Pain managed with PCA.  Denies N&V. Tolerating regular diet.  Surgical midline with prevena wound vac, no leaks at this time. gauze and tegadrem over old chest tube sites on the left side  + void, + flatus, + BM.  Patient denies SOB.  SCD's on.  Review plan with of care with patient. Call light and personal belongings with in reach. Hourly rounding in place. All needs met at this time.

## 2020-06-26 ENCOUNTER — APPOINTMENT (OUTPATIENT)
Dept: RADIOLOGY | Facility: MEDICAL CENTER | Age: 65
DRG: 853 | End: 2020-06-26
Attending: SURGERY
Payer: COMMERCIAL

## 2020-06-26 ENCOUNTER — APPOINTMENT (OUTPATIENT)
Dept: RADIOLOGY | Facility: MEDICAL CENTER | Age: 65
DRG: 853 | End: 2020-06-26
Attending: INTERNAL MEDICINE
Payer: COMMERCIAL

## 2020-06-26 VITALS
TEMPERATURE: 99.3 F | HEART RATE: 93 BPM | SYSTOLIC BLOOD PRESSURE: 96 MMHG | DIASTOLIC BLOOD PRESSURE: 71 MMHG | HEIGHT: 72 IN | WEIGHT: 226.85 LBS | RESPIRATION RATE: 19 BRPM | BODY MASS INDEX: 30.73 KG/M2 | OXYGEN SATURATION: 94 %

## 2020-06-26 PROBLEM — K92.2 GI BLEED: Status: RESOLVED | Noted: 2020-06-12 | Resolved: 2020-06-26

## 2020-06-26 PROBLEM — A41.9 SEPSIS (HCC): Status: RESOLVED | Noted: 2020-06-12 | Resolved: 2020-06-26

## 2020-06-26 LAB
ANION GAP SERPL CALC-SCNC: 9 MMOL/L (ref 7–16)
BASOPHILS # BLD AUTO: 1.2 % (ref 0–1.8)
BASOPHILS # BLD: 0.14 K/UL (ref 0–0.12)
BUN SERPL-MCNC: 6 MG/DL (ref 8–22)
CALCIUM SERPL-MCNC: 8.1 MG/DL (ref 8.5–10.5)
CHLORIDE SERPL-SCNC: 100 MMOL/L (ref 96–112)
CO2 SERPL-SCNC: 28 MMOL/L (ref 20–33)
CREAT SERPL-MCNC: 0.57 MG/DL (ref 0.5–1.4)
EOSINOPHIL # BLD AUTO: 0.4 K/UL (ref 0–0.51)
EOSINOPHIL NFR BLD: 3.4 % (ref 0–6.9)
ERYTHROCYTE [DISTWIDTH] IN BLOOD BY AUTOMATED COUNT: 53.1 FL (ref 35.9–50)
GLUCOSE SERPL-MCNC: 123 MG/DL (ref 65–99)
HCT VFR BLD AUTO: 26.7 % (ref 42–52)
HGB BLD-MCNC: 8.3 G/DL (ref 14–18)
IMM GRANULOCYTES # BLD AUTO: 0.13 K/UL (ref 0–0.11)
IMM GRANULOCYTES NFR BLD AUTO: 1.1 % (ref 0–0.9)
LYMPHOCYTES # BLD AUTO: 1.71 K/UL (ref 1–4.8)
LYMPHOCYTES NFR BLD: 14.4 % (ref 22–41)
MAGNESIUM SERPL-MCNC: 1.6 MG/DL (ref 1.5–2.5)
MCH RBC QN AUTO: 28.2 PG (ref 27–33)
MCHC RBC AUTO-ENTMCNC: 31.1 G/DL (ref 33.7–35.3)
MCV RBC AUTO: 90.8 FL (ref 81.4–97.8)
MONOCYTES # BLD AUTO: 1.2 K/UL (ref 0–0.85)
MONOCYTES NFR BLD AUTO: 10.1 % (ref 0–13.4)
NEUTROPHILS # BLD AUTO: 8.29 K/UL (ref 1.82–7.42)
NEUTROPHILS NFR BLD: 69.8 % (ref 44–72)
NRBC # BLD AUTO: 0 K/UL
NRBC BLD-RTO: 0 /100 WBC
PLATELET # BLD AUTO: 988 K/UL (ref 164–446)
PMV BLD AUTO: 8.7 FL (ref 9–12.9)
POTASSIUM SERPL-SCNC: 3.7 MMOL/L (ref 3.6–5.5)
RBC # BLD AUTO: 2.94 M/UL (ref 4.7–6.1)
SODIUM SERPL-SCNC: 137 MMOL/L (ref 135–145)
WBC # BLD AUTO: 11.9 K/UL (ref 4.8–10.8)

## 2020-06-26 PROCEDURE — 99239 HOSP IP/OBS DSCHRG MGMT >30: CPT | Performed by: INTERNAL MEDICINE

## 2020-06-26 PROCEDURE — 93970 EXTREMITY STUDY: CPT

## 2020-06-26 PROCEDURE — 80048 BASIC METABOLIC PNL TOTAL CA: CPT

## 2020-06-26 PROCEDURE — 83735 ASSAY OF MAGNESIUM: CPT

## 2020-06-26 PROCEDURE — 93970 EXTREMITY STUDY: CPT | Mod: 26 | Performed by: INTERNAL MEDICINE

## 2020-06-26 PROCEDURE — 85025 COMPLETE CBC W/AUTO DIFF WBC: CPT

## 2020-06-26 PROCEDURE — 700111 HCHG RX REV CODE 636 W/ 250 OVERRIDE (IP): Performed by: INTERNAL MEDICINE

## 2020-06-26 PROCEDURE — A9270 NON-COVERED ITEM OR SERVICE: HCPCS | Performed by: INTERNAL MEDICINE

## 2020-06-26 PROCEDURE — 36415 COLL VENOUS BLD VENIPUNCTURE: CPT

## 2020-06-26 PROCEDURE — 700102 HCHG RX REV CODE 250 W/ 637 OVERRIDE(OP): Performed by: INTERNAL MEDICINE

## 2020-06-26 PROCEDURE — 71045 X-RAY EXAM CHEST 1 VIEW: CPT

## 2020-06-26 RX ORDER — OXYCODONE HYDROCHLORIDE 5 MG/1
10 TABLET ORAL EVERY 8 HOURS PRN
Qty: 7 TAB | Refills: 0 | Status: SHIPPED | OUTPATIENT
Start: 2020-06-26 | End: 2020-06-29

## 2020-06-26 RX ORDER — OMEPRAZOLE 40 MG/1
40 CAPSULE, DELAYED RELEASE ORAL DAILY
Qty: 30 CAP | Refills: 1 | Status: ON HOLD
Start: 2020-06-27 | End: 2021-04-22

## 2020-06-26 RX ORDER — OXYCODONE HYDROCHLORIDE 5 MG/1
5 TABLET ORAL EVERY 8 HOURS PRN
Qty: 7 TAB | Refills: 0 | Status: SHIPPED | OUTPATIENT
Start: 2020-06-26 | End: 2020-06-26

## 2020-06-26 RX ORDER — AMOXICILLIN AND CLAVULANATE POTASSIUM 875; 125 MG/1; MG/1
1 TABLET, FILM COATED ORAL 2 TIMES DAILY
Qty: 14 TAB | Refills: 2 | Status: ON HOLD | OUTPATIENT
Start: 2020-06-26 | End: 2020-07-14

## 2020-06-26 RX ORDER — CYANOCOBALAMIN 1000 UG/ML
1000 INJECTION, SOLUTION INTRAMUSCULAR; SUBCUTANEOUS
Qty: 3 ML | Refills: 1 | Status: ON HOLD
Start: 2020-07-15 | End: 2020-07-14

## 2020-06-26 RX ADMIN — ENOXAPARIN SODIUM 40 MG: 40 INJECTION SUBCUTANEOUS at 04:38

## 2020-06-26 RX ADMIN — OXYCODONE HYDROCHLORIDE 10 MG: 10 TABLET ORAL at 04:35

## 2020-06-26 RX ADMIN — OMEPRAZOLE 40 MG: 20 CAPSULE, DELAYED RELEASE ORAL at 04:37

## 2020-06-26 RX ADMIN — FUROSEMIDE 40 MG: 10 INJECTION, SOLUTION INTRAMUSCULAR; INTRAVENOUS at 04:35

## 2020-06-26 RX ADMIN — Medication 400 MG: at 04:38

## 2020-06-26 NOTE — PROGRESS NOTES
"    DATE: 6/26/2020    Post Operative Day  5 Left video-assisted thoracoscopy and day 12 sleeve gastrectomy, splenectomy and partial resection of diaphragm.    Interval Events:  Preveena removed. Staples removed.  Oral pain meds    PHYSICAL EXAMINATION:  Vital Signs: BP (!) 96/71   Pulse 93   Temp 37.4 °C (99.3 °F) (Temporal)   Resp 19   Ht 1.829 m (6' 0.01\")   Wt 102.9 kg (226 lb 13.7 oz)   SpO2 94%     Midline incision is clean and dry.  Chest bandages CDI    Laboratory Values:   Recent Labs     06/24/20  0451 06/25/20  0450 06/26/20  0436   WBC 13.1* 12.5* 11.9*   RBC 2.91* 2.94* 2.94*   HEMOGLOBIN 8.2* 8.4* 8.3*   HEMATOCRIT 27.8* 26.9* 26.7*   MCV 95.5 91.5 90.8   MCH 28.2 28.6 28.2   MCHC 29.5* 31.2* 31.1*   RDW 54.6* 51.9* 53.1*   PLATELETCT 990* 959* 988*   MPV 9.0 8.6* 8.7*     Recent Labs     06/24/20  0451 06/25/20  0450 06/26/20  0436   SODIUM 133* 133* 137   POTASSIUM 3.8 3.6 3.7   CHLORIDE 97 96 100   CO2 32 31 28   GLUCOSE 99 99 123*   BUN 6* 6* 6*   CREATININE 0.62 0.52 0.57   CALCIUM 7.9* 8.0* 8.1*        Imaging:     ASSESSMENT AND PLAN:     Good progress. Stable for discharge from Surgery standpoint.  Given a Rx for Augmentin PRN febrile illness/overwhelming post splenectomy sepsis as part of his post splenectomy prophylaxis regimen.  Telehealth F/U with Dr Moura.     ____________________________________     Zeke Valdez M.D.        "

## 2020-06-26 NOTE — DISCHARGE SUMMARY
Discharge Summary    CHIEF COMPLAINT ON ADMISSION  Chief Complaint   Patient presents with   • Bloody Stools   • Blood in Vomit       Reason for Admission  Upper GI bleeding/gastric lymphoma    Admission Date  6/11/2020    CODE STATUS  Full Code    HPI & HOSPITAL COURSE     65 y.o. male has a PMHx of HTN, perforated gastric cancer, who presented on 6/11/2020 with dark stools.  Morning of admission the patient presented to his primary care provider and was told that he had an abnormal CBC.  Stool sample was positive for blood.  Patient does report fatigue and dark bloody stools.  He then developed lightheadedness and an episode of hematemesis.He does report vague abdominal pain, early satiety, and decreased appetite for weeks. Denies heavy alcohol and no blood thinners. Patient was admitted with sepsis secondary to abdominal infection.  Started on ceftriaxone and metronidazole.  CT abdomen was also concerning for gastric mass.  GI was consulted Dr. Ray.  The patient was started on a pantoprazole drip. Antibiotics was held, EGD on 6/12 demonstrated a large cratered stomach ulcer in the fundus/cardia area status post cold forceps biopsy.  The patient underwent  exploratory celiotomy with a sleeve gastrectomy and splenectomy with partial resection of the left hemidiaphragm by Dr. 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     The patient has lower extremity edema, echo did not show any abnormalities however ultrasound showed nonocclusive thrombus seen in 1 of the proximal right posterior tibial vein, this finding was discussed with the hematologist and recommended to start with Lovenox therapeutic treatment, the patient was also treated with low-dose Lasix.    The  patient developed left pleural effusion and he underwent left pulmonary decortication and chest tube, and the tube was removed later, no study for the pleural fluid was done, the patient will be discharged on Augmentin as needed per surgery recommendations..        The patient was evaluated by PT and OT who recommended home health however the patient refused home health and he will call and live with his daughter who will provide a good care for him.    Therefore, he is discharged in good and stable condition to home with close outpatient follow-up.    The patient met 2-midnight criteria for an inpatient stay at the time of discharge.    Discharge Date  06/26/20      FOLLOW UP ITEMS POST DISCHARGE  Follow-up with oncologist for treatment for gastric lymphoma    DISCHARGE DIAGNOSES  Principal Problem:    Gastric lymphoma (HCC) POA: Yes  Active Problems:    Edema POA: Unknown    Acute blood loss anemia POA: Yes    S/P splenectomy POA: No    Pleural effusion on left POA: No    Thrombocytosis (HCC) POA: Yes    Hyponatremia POA: Yes    Hyperglycemia POA: Yes    Left arm swelling POA: Unknown  Resolved Problems:    GI bleed POA: Yes    Sepsis (HCC) POA: Yes      FOLLOW UP  Nacho Shea M.D.  [address]  [address]  [facility] [address]  [phone]    In 2 weeks      Montse Moura M.D.  [address]  [address]  [facility] [address] [phone]    In 1 week  follow up with telehealth      MEDICATIONS ON DISCHARGE     Medication List      START taking these medications      Instructions   amoxicillin-clavulanate 875-125 MG Tabs  Commonly known as:  AUGMENTIN   Take 1 Tab by mouth 2 times a day.  Dose:  1 Tab     cyanocobalamin 1000 MCG/ML Soln  Start taking on:  July 15, 2020  Commonly known as:  VITAMIN B-12   1 mL by Intramuscular route every 30 days for 103 days.  Dose:  1,000 mcg     enoxaparin 100 MG/ML Soln inj  Commonly known as:  LOVENOX   Inject 100 mg as instructed every 12 hours.  Dose:  100 mg     magnesium  oxide 400 MG Tabs tablet  Start taking on:  June 27, 2020  Commonly known as:  MAG-OX   Take 1 Tab by mouth every day.  Dose:  400 mg     omeprazole 40 MG delayed-release capsule  Start taking on:  June 27, 2020  Commonly known as:  PRILOSEC   Take 1 Cap by mouth every day.  Dose:  40 mg     oxyCODONE immediate-release 5 MG Tabs  Commonly known as:  ROXICODONE   Take 2 Tabs by mouth every 8 hours as needed for up to 3 days.  Dose:  10 mg        CONTINUE taking these medications      Instructions   lisinopril-hydrochlorothiazide 20-12.5 MG per tablet  Commonly known as:  PRINZIDE   Take 1 Tab by mouth every day.  Dose:  1 Tab     therapeutic multivitamin-minerals Tabs   Take 1 Tab by mouth every day.  Dose:  1 Tab     VITAMIN D PO   Take  by mouth.            Allergies  No Known Allergies    DIET  Orders Placed This Encounter   Procedures   • Diet Order Regular     Standing Status:   Standing     Number of Occurrences:   1     Order Specific Question:   Diet:     Answer:   Regular [1]     Order Specific Question:   Miscellaneous modifications:     Answer:   6 Small Meals [4]       ACTIVITY  As tolerated.  Weight bearing as tolerated    CONSULTATIONS  General surgeon  Hematologist    PROCEDURES  Left posterolateral thoracoscopy    Exploratory celiotomy, a sleeve gastrectomy, splenectomy and   partial resection of the left hemidiaphragm.      LABORATORY  Lab Results   Component Value Date    SODIUM 137 06/26/2020    POTASSIUM 3.7 06/26/2020    CHLORIDE 100 06/26/2020    CO2 28 06/26/2020    GLUCOSE 123 (H) 06/26/2020    BUN 6 (L) 06/26/2020    CREATININE 0.57 06/26/2020        Lab Results   Component Value Date    WBC 11.9 (H) 06/26/2020    HEMOGLOBIN 8.3 (L) 06/26/2020    HEMATOCRIT 26.7 (L) 06/26/2020    PLATELETCT 988 (H) 06/26/2020        Total time of the discharge process exceeds 34 minutes.

## 2020-06-26 NOTE — PROGRESS NOTES
Bedside report received.  Assessment complete.  A&O x 4. Patient calls appropriately.  Patient mobilizes independently.  Patient has 2/10 pain. Declines interventions at this time.  Denies N&V. Tolerating regular diet.  Surgical site to left flank and left back, old SMOOTH site on left lower abdomen.Healed midline to abdomen, removed wound vac and staples yesterday  + void, + flatus, + BM.  Patient denies SOB.  SCD's off.  Review plan with of care with patient. Call light and personal belongings with in reach. Hourly rounding in place. All needs met at this time.

## 2020-06-26 NOTE — CARE PLAN
Problem: Nutritional:  Goal: Achieve adequate nutritional intake  Description: Patient will consume 50% of meals  Outcome: MET

## 2020-06-26 NOTE — PROGRESS NOTES
Pt AO x  4  Vitals signs stable  Pt denies SOB  O2 sat >90% on RA breathing unlabored  Pt endorses moderate to severe L flank pain at chest tube removal site. Medicated per mar.  Pt denies N/V/D  + voiding, + flatus, + bowel sounds   Pt ambulates self   SCDs on.     Updated plan of care discussed with pt. Safety education done. Falls precautions in place.   Pt safety maintained. Hourly rounding done.

## 2020-06-26 NOTE — THERAPY
Missed Therapy     Patient Name: Napoleon Zendejas  Age:  65 y.o., Sex:  male  Medical Record #: [id number]  Today's Date: 6/26/2020    Discussed missed therapy with RN hold on therapy right now as pt fhas new acute dvt in lower extremity. Will follow up as able.

## 2020-06-26 NOTE — CARE PLAN
Problem: Safety  Goal: Will remain free from injury  Outcome: PROGRESSING AS EXPECTED  Patient steady on feet, calls when needing assistance.     Problem: Pain Management  Goal: Pain level will decrease to patient's comfort goal  Outcome: PROGRESSING AS EXPECTED  Patient educated on pain medications, availability, and alternatives.

## 2020-06-26 NOTE — PROGRESS NOTES
"    DATE: 6/25/2020    Post Operative Day  4 Left video-assisted thoracoscopy and day 12 sleeve gastrectomy, splenectomy and partial resection of diaphragm.    Interval Events:  Preveena removed. Staples removed.  Oral pain meds    PHYSICAL EXAMINATION:  Vital Signs: /76   Pulse 95   Temp 37.7 °C (99.8 °F) (Temporal)   Resp 18   Ht 1.829 m (6' 0.01\")   Wt 102.9 kg (226 lb 13.7 oz)   SpO2 96%     Midline incision is clean and dry.    Laboratory Values:   Recent Labs     06/23/20  0642 06/24/20 0451 06/25/20 0450   WBC 15.6* 13.1* 12.5*   RBC 3.16* 2.91* 2.94*   HEMOGLOBIN 8.9* 8.2* 8.4*   HEMATOCRIT 29.1* 27.8* 26.9*   MCV 92.1 95.5 91.5   MCH 28.2 28.2 28.6   MCHC 30.6* 29.5* 31.2*   RDW 52.5* 54.6* 51.9*   PLATELETCT 1003* 990* 959*   MPV 8.6* 9.0 8.6*     Recent Labs     06/23/20  0642 06/24/20 0451 06/25/20  0450   SODIUM 134* 133* 133*   POTASSIUM 3.9 3.8 3.6   CHLORIDE 97 97 96   CO2 30 32 31   GLUCOSE 98 99 99   BUN 5* 6* 6*   CREATININE 0.52 0.62 0.52   CALCIUM 8.0* 7.9* 8.0*        Imaging:     ASSESSMENT AND PLAN:     Good progress. Stable for discharge from Surgery standpoint.  Patient will need a discharge scrip for Augmentin PRN febrile illness/overwhelming post splenectomy sepsis as part of his post splenectomy prophylaxis regimen.  Telehealth F/U with Dr Moura.     ____________________________________     Mil Garcia M.D.        "

## 2020-06-26 NOTE — PROGRESS NOTES
Discharge instructions discussed with patient and daughter, no further questions or concerns at this time. IV's and armbands removed.Lovenox teaching completed and educational pamphlet given. Patient escorted downstairs via wheelchair by CNA.

## 2020-06-26 NOTE — DISCHARGE INSTRUCTIONS
Discharge Instructions    Discharged to home by car with relative. Discharged via wheelchair, hospital escort: Yes.  Special equipment needed: Not Applicable    Be sure to schedule a follow-up appointment with your primary care doctor or any specialists as instructed.     Discharge Plan:   Diet Plan: Discussed  Activity Level: Discussed  Confirmed Follow up Appointment: Patient to Call and Schedule Appointment  Confirmed Symptoms Management: Discussed  Medication Reconciliation Updated: Yes  Pneumococcal Vaccine Administered/Refused: Given (See MAR)    I understand that a diet low in cholesterol, fat, and sodium is recommended for good health. Unless I have been given specific instructions below for another diet, I accept this instruction as my diet prescription.   Other diet: Regular    Special Instructions: None    · Is patient discharged on Warfarin / Coumadin?   No     Depression / Suicide Risk    As you are discharged from this [facility] facility, it is important to learn how to keep safe from harming yourself.    Recognize the warning signs:  · Abrupt changes in personality, positive or negative- including increase in energy   · Giving away possessions  · Change in eating patterns- significant weight changes-  positive or negative  · Change in sleeping patterns- unable to sleep or sleeping all the time   · Unwillingness or inability to communicate  · Depression  · Unusual sadness, discouragement and loneliness  · Talk of wanting to die  · Neglect of personal appearance   · Rebelliousness- reckless behavior  · Withdrawal from people/activities they love  · Confusion- inability to concentrate     If you or a loved one observes any of these behaviors or has concerns about self-harm, here's what you can do:  · Talk about it- your feelings and reasons for harming yourself  · Remove any means that you might use to hurt yourself (examples: pills, rope, extension cords, firearm)  · Get professional help from the  community (Mental Health, Substance Abuse, psychological counseling)  · Do not be alone:Call your Safe Contact- someone whom you trust who will be there for you.  · Call your local CRISIS HOTLINE 809-8321 or 577-096-9546  · Call your local Children's Mobile Crisis Response Team [place] (559) 041-8988 or www.Marro.ws  · Call the toll free National Suicide Prevention Hotlines   · National Suicide Prevention Lifeline 517-647-YLSB (9519)  · [employer] Hope Line Network 800-SUICIDE (614-2728)  Upper GI Endoscopy with Biopsy  Upper GI endoscopy is a test that looks inside your upper GI (gastrointestinal) tract. This includes your food pipe (esophagus), stomach, and duodenum (the first part of your small intestine). The test is also known as EGD (esophagogastroduodenoscopy). It is done using a tool called an endoscope. This is a long, thin, flexible tube with a tiny camera at one end. The test helps find problems such as ulcers, infection, or growths. It can check for celiac disease, gastritis, and esophagitis. During the test, tissue samples (biopsies) may be taken and studied for problems.      With the aid of an endoscope, the doctor can view the inside of the upper GI tract and also take small tissue samples.   Getting ready for your procedure  Follow any instructions from your healthcare provider.   Tell your healthcare provider about any medicines you are taking. This includes prescription and over-the-counter medicines, vitamins, herbs, and other supplements. You may need to stop taking some or all of these before the test.   Follow any directions you’re given for not eating or drinking before the test.   The day of the procedure  The procedure takes about 20 minutes. You will go home the same day.  Before the procedure begins:  You may be given medicine to help you relax or sleep (sedation). This is given through an IV (intravenous) line placed in a vein in your arm or hand. Your throat may be numbed  with a spray or liquid. You will be given a small plastic guard to protect your teeth. You will be given oxygen to breathe through small prongs (called a cannula) that fit just inside your nose. You will be connected to a machine to watch your heartbeat.   During the procedure:  · You lie on your left side. The endoscope is placed in your mouth, and it moves down your throat.   · Air is used to expand your GI tract so the lining can be seen more clearly. You may feel pressure or mild pain from the air.   · The scope sends pictures of your GI tract to a computer screen. The esophagus, stomach, and duodenum are viewed.   · Problems, such as bleeding, redness or swelling (inflammation), or growths may be seen and treated. Using tools passed through the endoscope, small samples of tissue can be taken. In some cases, small growths can be removed. Other treatments may be done such as stretching (dilating) a narrowed area.   · The endoscope is then removed.  After the procedure:   The healthcare provider will talk with you later about the results. You’ll rest until you can go home. Have an adult family member or friend drive you. Relax for the rest of the day. If you had a biopsy, the results will be ready in about 7 days.   Recovering at home  You’ll likely feel drowsy after the test. A mild sore throat, mild gas, and bloating are normal. Once you are home, follow any instructions you have been given. If you had sedation, don't drive, operate machinery, or make major decisions until the next day.   When to call your healthcare provider  Call your healthcare provider if you have any of these:  · Fever of 100.4°F (38°C) or higher, or as directed by your healthcare provider   · Shaking chills  · Chest pain  · Dark-colored stools  · Severe abdominal pain that doesn't go away when passing gas   · Sore throat that doesn’t go away   · Trouble swallowing  · Vomiting, especially with blood   · Any other signs or symptoms indicated  by your healthcare provider   Follow-up  If you had a biopsy, the results will be ready in about 7 days. Your healthcare provider will talk with you about any more testing or treatment that is needed.   Risks and possible complications  · Sore throat or hoarseness  · Bloating  · Nausea  · Allergic reaction to the sedative or numbing medicine  · Bleeding during or after the procedure  · Too much bleeding from the biopsy site (if a biopsy is done)  · A hole or tear (perforation) in the lining of the digestive tract  · Inhaling food or fluid into the lungs (aspiration)  · Irregular heartbeat or cardiac arrest in someone with heart or lung disease      © 0812-8517 [employer]. [address] [place] [id number]. All rights reserved. This information is not intended as a substitute for professional medical care. Always follow your healthcare professional's instructions.   Upper GI Endoscopy with Biopsy  Upper GI endoscopy is a test that looks inside your upper GI (gastrointestinal) tract. This includes your food pipe (esophagus), stomach, and duodenum (the first part of your small intestine). The test is also known as EGD (esophagogastroduodenoscopy). It is done using a tool called an endoscope. This is a long, thin, flexible tube with a tiny camera at one end. The test helps find problems such as ulcers, infection, or growths. It can check for celiac disease, gastritis, and esophagitis. During the test, tissue samples (biopsies) may be taken and studied for problems.      With the aid of an endoscope, the doctor can view the inside of the upper GI tract and also take small tissue samples.   Getting ready for your procedure  Follow any instructions from your healthcare provider.   Tell your healthcare provider about any medicines you are taking. This includes prescription and over-the-counter medicines, vitamins, herbs, and other supplements. You may need to stop taking some or all of these before  the test.   Follow any directions you’re given for not eating or drinking before the test.   The day of the procedure  The procedure takes about 20 minutes. You will go home the same day.  Before the procedure begins:  You may be given medicine to help you relax or sleep (sedation). This is given through an IV (intravenous) line placed in a vein in your arm or hand. Your throat may be numbed with a spray or liquid. You will be given a small plastic guard to protect your teeth. You will be given oxygen to breathe through small prongs (called a cannula) that fit just inside your nose. You will be connected to a machine to watch your heartbeat.   During the procedure:  · You lie on your left side. The endoscope is placed in your mouth, and it moves down your throat.   · Air is used to expand your GI tract so the lining can be seen more clearly. You may feel pressure or mild pain from the air.   · The scope sends pictures of your GI tract to a computer screen. The esophagus, stomach, and duodenum are viewed.   · Problems, such as bleeding, redness or swelling (inflammation), or growths may be seen and treated. Using tools passed through the endoscope, small samples of tissue can be taken. In some cases, small growths can be removed. Other treatments may be done such as stretching (dilating) a narrowed area.   · The endoscope is then removed.  After the procedure:   The healthcare provider will talk with you later about the results. You’ll rest until you can go home. Have an adult family member or friend drive you. Relax for the rest of the day. If you had a biopsy, the results will be ready in about 7 days.   Recovering at home  You’ll likely feel drowsy after the test. A mild sore throat, mild gas, and bloating are normal. Once you are home, follow any instructions you have been given. If you had sedation, don't drive, operate machinery, or make major decisions until the next day.   When to call your healthcare  provider  Call your healthcare provider if you have any of these:  · Fever of 100.4°F (38°C) or higher, or as directed by your healthcare provider   · Shaking chills  · Chest pain  · Dark-colored stools  · Severe abdominal pain that doesn't go away when passing gas   · Sore throat that doesn’t go away   · Trouble swallowing  · Vomiting, especially with blood   · Any other signs or symptoms indicated by your healthcare provider   Follow-up  If you had a biopsy, the results will be ready in about 7 days. Your healthcare provider will talk with you about any more testing or treatment that is needed.   Risks and possible complications  · Sore throat or hoarseness  · Bloating  · Nausea  · Allergic reaction to the sedative or numbing medicine  · Bleeding during or after the procedure  · Too much bleeding from the biopsy site (if a biopsy is done)  · A hole or tear (perforation) in the lining of the digestive tract  · Inhaling food or fluid into the lungs (aspiration)  · Irregular heartbeat or cardiac arrest in someone with heart or lung disease      © 9069-4568 [employer]. [address] [place] [address]. All rights reserved. This information is not intended as a substitute for professional medical care. Always follow your healthcare professional's instructions.       Enoxaparin injection  Brand Names: Enoxaparin Sodium 100mg/ml Solution for Injection, Enoxaparin Sodium 100mg/ml Solution for Injection (Amerinet), Enoxaparin Sodium 100mg/ml Solution for Injection (NOVAPLUS), Enoxaparin Sodium 120mg/0.8ml Solution for Injection, Enoxaparin Sodium 120mg/0.8ml Solution for Injection (Amerinet), Enoxaparin Sodium 120mg/0.8ml Solution for Injection (NOVAPLUS), Enoxaparin Sodium 150mg/ml Solution for Injection, Enoxaparin Sodium 150mg/ml Solution for Injection (Amerinet), Enoxaparin Sodium 150mg/ml Solution for Injection (NOVAPLUS), Enoxaparin Sodium 300mg/3ml Solution for Injection, Enoxaparin Sodium  300mg/3ml Solution for Injection (NOVAPLUS), Enoxaparin Sodium 30mg/0.3ml Solution for Injection, Enoxaparin Sodium 30mg/0.3ml Solution for Injection (Amerinet), Enoxaparin Sodium 30mg/0.3ml Solution for Injection (NOVAPLUS), Enoxaparin Sodium 40mg/0.4ml Solution for Injection, Enoxaparin Sodium 40mg/0.4ml Solution for Injection (Amerinet), Enoxaparin Sodium 40mg/0.4ml Solution for Injection (NOVAPLUS), Enoxaparin Sodium 60mg/0.6ml Solution for Injection, Enoxaparin Sodium 60mg/0.6ml Solution for Injection (Amerinet), Enoxaparin Sodium 60mg/0.6ml Solution for Injection (NOVAPLUS),     What is this medicine?  ENOXAPARIN (ee nox a PA rin) is used after knee, hip, or abdominal surgeries to prevent blood clotting. It is also used to treat existing blood clots in the lungs or in the veins.  How should I use this medicine?  This medicine is for injection under the skin. It is usually given by a health-care professional. You or a family member may be trained on how to give the injections. If you are to give yourself injections, make sure you understand how to use the syringe, measure the dose if necessary, and give the injection. To avoid bruising, do not rub the site where this medicine has been injected. Do not take your medicine more often than directed. Do not stop taking except on the advice of your doctor or health care professional.  Make sure you receive a puncture-resistant container to dispose of the needles and syringes once you have finished with them. Do not reuse these items. Return the container to your doctor or health care professional for proper disposal.  Talk to your pediatrician regarding the use of this medicine in children. Special care may be needed.  What side effects may I notice from receiving this medicine?  Side effects that you should report to your doctor or health care professional as soon as possible:  · allergic reactions like skin rash, itching or hives, swelling of the face, lips, or  tongue  · feeling faint or lightheaded, falls  · signs and symptoms of bleeding such as bloody or black, tarry stools; red or dark-brown urine; spitting up blood or brown material that looks like coffee grounds; red spots on the skin; unusual bruising or bleeding from the eye, gums, or nose  Side effects that usually do not require medical attention (report to your doctor or health care professional if they continue or are bothersome):  · pain, redness, or irritation at site where injected  What may interact with this medicine?  · aspirin and aspirin-like medicines  · certain medicines that treat or prevent blood clots  · dipyridamole  · NSAIDs, medicines for pain and inflammation, like ibuprofen or naproxen    What if I miss a dose?  If you miss a dose, take it as soon as you can. If it is almost time for your next dose, take only that dose. Do not take double or extra doses.  Where should I keep my medicine?  Keep out of the reach of children.  Store at room temperature between 15 and 30 degrees C (59 and 86 degrees F). Do not freeze. If your injections have been specially prepared, you may need to store them in the refrigerator. Ask your pharmacist. Throw away any unused medicine after the expiration date.  What should I tell my health care provider before I take this medicine?  They need to know if you have any of these conditions:  · bleeding disorders, hemorrhage, or hemophilia  · infection of the heart or heart valves  · kidney or liver disease  · previous stroke  · prosthetic heart valve  · recent surgery or delivery of a baby  · ulcer in the stomach or intestine, diverticulitis, or other bowel disease  · an unusual or allergic reaction to enoxaparin, heparin, pork or pork products, other medicines, foods, dyes, or preservatives  · pregnant or trying to get pregnant  · breast-feeding  What should I watch for while using this medicine?  Visit your doctor or health care professional for regular checks on your  progress. Your condition will be monitored carefully while you are receiving this medicine.  Notify your doctor or health care professional and seek emergency treatment if you develop breathing problems; changes in vision; chest pain; severe, sudden headache; pain, swelling, warmth in the leg; trouble speaking; sudden numbness or weakness of the face, arm, or leg. These can be signs that your condition has gotten worse.  If you are going to have surgery, tell your doctor or health care professional that you are taking this medicine.  Do not stop taking this medicine without first talking to your doctor. Be sure to refill your prescription before you run out of medicine.  Avoid sports and activities that might cause injury while you are using this medicine. Severe falls or injuries can cause unseen bleeding. Be careful when using sharp tools or knives. Consider using an electric razor. Take special care brushing or flossing your teeth. Report any injuries, bruising, or red spots on the skin to your doctor or health care professional.    NOTE:This sheet is a summary. It may not cover all possible information. If you have questions about this medicine, talk to your doctor, pharmacist, or health care provider. Copyright© 2020 [employer]

## 2020-07-13 NOTE — OR NURSING
COVID-19 Pre-surgery screenin. Do you have an undiagnosed respiratory illness or symptoms such as coughing or sneezing?  (Yes/No)  a. Onset of Sx   b. Acute vs. chronic respiratory illness     2. Do you have an unexplained fever greater than 100.4 degrees Fahrenheit or 38 degrees Celsius?      (Yes/No)    3. Have you had direct exposure to a patient who tested positive for Covid-19?     (Yes/No)    4. Have you traveled outside [facility] in the last 14 days?     5. Have you had any loss of your sense of taste or smell? Have you had N/V or sore throat?     Patient has been informed of visitor policy and asked to wear a mask upon entering the hospital    (YES/NO)      LEFT MESSAGE-CALLED BOTH NUMBERS GIVEN.

## 2020-07-14 ENCOUNTER — APPOINTMENT (OUTPATIENT)
Dept: RADIOLOGY | Facility: MEDICAL CENTER | Age: 65
End: 2020-07-14
Attending: SURGERY
Payer: COMMERCIAL

## 2020-07-14 ENCOUNTER — ANESTHESIA EVENT (OUTPATIENT)
Dept: SURGERY | Facility: MEDICAL CENTER | Age: 65
End: 2020-07-14
Payer: COMMERCIAL

## 2020-07-14 ENCOUNTER — HOSPITAL ENCOUNTER (OUTPATIENT)
Facility: MEDICAL CENTER | Age: 65
End: 2020-07-14
Attending: SURGERY | Admitting: SURGERY
Payer: COMMERCIAL

## 2020-07-14 ENCOUNTER — ANESTHESIA (OUTPATIENT)
Dept: SURGERY | Facility: MEDICAL CENTER | Age: 65
End: 2020-07-14
Payer: COMMERCIAL

## 2020-07-14 VITALS
OXYGEN SATURATION: 94 % | BODY MASS INDEX: 27.86 KG/M2 | RESPIRATION RATE: 16 BRPM | SYSTOLIC BLOOD PRESSURE: 111 MMHG | WEIGHT: 205.69 LBS | DIASTOLIC BLOOD PRESSURE: 73 MMHG | HEIGHT: 72 IN | HEART RATE: 60 BPM | TEMPERATURE: 97.2 F

## 2020-07-14 LAB
COVID ORDER STATUS COVID19: NORMAL
SARS-COV-2 RDRP RESP QL NAA+PROBE: NOTDETECTED
SPECIMEN SOURCE: NORMAL

## 2020-07-14 PROCEDURE — 160048 HCHG OR STATISTICAL LEVEL 1-5: Performed by: SURGERY

## 2020-07-14 PROCEDURE — 700101 HCHG RX REV CODE 250: Performed by: ANESTHESIOLOGY

## 2020-07-14 PROCEDURE — 71045 X-RAY EXAM CHEST 1 VIEW: CPT

## 2020-07-14 PROCEDURE — 700111 HCHG RX REV CODE 636 W/ 250 OVERRIDE (IP): Performed by: ANESTHESIOLOGY

## 2020-07-14 PROCEDURE — U0004 COV-19 TEST NON-CDC HGH THRU: HCPCS

## 2020-07-14 PROCEDURE — 160009 HCHG ANES TIME/MIN: Performed by: SURGERY

## 2020-07-14 PROCEDURE — 160028 HCHG SURGERY MINUTES - 1ST 30 MINS LEVEL 3: Performed by: SURGERY

## 2020-07-14 PROCEDURE — A6402 STERILE GAUZE <= 16 SQ IN: HCPCS | Performed by: SURGERY

## 2020-07-14 PROCEDURE — 700105 HCHG RX REV CODE 258: Performed by: SURGERY

## 2020-07-14 PROCEDURE — C9803 HOPD COVID-19 SPEC COLLECT: HCPCS | Performed by: SURGERY

## 2020-07-14 PROCEDURE — 160025 RECOVERY II MINUTES (STATS): Performed by: SURGERY

## 2020-07-14 PROCEDURE — 160046 HCHG PACU - 1ST 60 MINS PHASE II: Performed by: SURGERY

## 2020-07-14 PROCEDURE — C1788 PORT, INDWELLING, IMP: HCPCS | Performed by: SURGERY

## 2020-07-14 PROCEDURE — A9270 NON-COVERED ITEM OR SERVICE: HCPCS | Performed by: SURGERY

## 2020-07-14 PROCEDURE — 700102 HCHG RX REV CODE 250 W/ 637 OVERRIDE(OP): Performed by: SURGERY

## 2020-07-14 PROCEDURE — 700111 HCHG RX REV CODE 636 W/ 250 OVERRIDE (IP): Performed by: SURGERY

## 2020-07-14 PROCEDURE — 160002 HCHG RECOVERY MINUTES (STAT): Performed by: SURGERY

## 2020-07-14 PROCEDURE — 501838 HCHG SUTURE GENERAL: Performed by: SURGERY

## 2020-07-14 PROCEDURE — 160035 HCHG PACU - 1ST 60 MINS PHASE I: Performed by: SURGERY

## 2020-07-14 DEVICE — CATHETER POWERPORT CLEARVUE MRI 8FR ATTACHABLE CHRONOFLEX: Type: IMPLANTABLE DEVICE | Site: CHEST | Status: FUNCTIONAL

## 2020-07-14 RX ORDER — ONDANSETRON 2 MG/ML
4 INJECTION INTRAMUSCULAR; INTRAVENOUS
Status: DISCONTINUED | OUTPATIENT
Start: 2020-07-14 | End: 2020-07-14 | Stop reason: HOSPADM

## 2020-07-14 RX ORDER — SODIUM CHLORIDE, SODIUM LACTATE, POTASSIUM CHLORIDE, CALCIUM CHLORIDE 600; 310; 30; 20 MG/100ML; MG/100ML; MG/100ML; MG/100ML
INJECTION, SOLUTION INTRAVENOUS CONTINUOUS
Status: DISCONTINUED | OUTPATIENT
Start: 2020-07-14 | End: 2020-07-14

## 2020-07-14 RX ORDER — OXYCODONE HCL 5 MG/5 ML
10 SOLUTION, ORAL ORAL
Status: DISCONTINUED | OUTPATIENT
Start: 2020-07-14 | End: 2020-07-14 | Stop reason: HOSPADM

## 2020-07-14 RX ORDER — HEPARIN SODIUM 5000 [USP'U]/ML
INJECTION, SOLUTION INTRAVENOUS; SUBCUTANEOUS
Status: DISCONTINUED
Start: 2020-07-14 | End: 2020-07-14 | Stop reason: HOSPADM

## 2020-07-14 RX ORDER — DIPHENHYDRAMINE HYDROCHLORIDE 50 MG/ML
12.5 INJECTION INTRAMUSCULAR; INTRAVENOUS
Status: DISCONTINUED | OUTPATIENT
Start: 2020-07-14 | End: 2020-07-14 | Stop reason: HOSPADM

## 2020-07-14 RX ORDER — ONDANSETRON 2 MG/ML
INJECTION INTRAMUSCULAR; INTRAVENOUS PRN
Status: DISCONTINUED | OUTPATIENT
Start: 2020-07-14 | End: 2020-07-14 | Stop reason: SURG

## 2020-07-14 RX ORDER — CEFAZOLIN SODIUM 1 G/3ML
INJECTION, POWDER, FOR SOLUTION INTRAMUSCULAR; INTRAVENOUS PRN
Status: DISCONTINUED | OUTPATIENT
Start: 2020-07-14 | End: 2020-07-14 | Stop reason: SURG

## 2020-07-14 RX ORDER — HYDRALAZINE HYDROCHLORIDE 20 MG/ML
5 INJECTION INTRAMUSCULAR; INTRAVENOUS
Status: DISCONTINUED | OUTPATIENT
Start: 2020-07-14 | End: 2020-07-14 | Stop reason: HOSPADM

## 2020-07-14 RX ORDER — DEXAMETHASONE SODIUM PHOSPHATE 4 MG/ML
INJECTION, SOLUTION INTRA-ARTICULAR; INTRALESIONAL; INTRAMUSCULAR; INTRAVENOUS; SOFT TISSUE PRN
Status: DISCONTINUED | OUTPATIENT
Start: 2020-07-14 | End: 2020-07-14 | Stop reason: SURG

## 2020-07-14 RX ORDER — OXYCODONE HCL 5 MG/5 ML
5 SOLUTION, ORAL ORAL
Status: DISCONTINUED | OUTPATIENT
Start: 2020-07-14 | End: 2020-07-14 | Stop reason: HOSPADM

## 2020-07-14 RX ORDER — SODIUM CHLORIDE, SODIUM LACTATE, POTASSIUM CHLORIDE, CALCIUM CHLORIDE 600; 310; 30; 20 MG/100ML; MG/100ML; MG/100ML; MG/100ML
INJECTION, SOLUTION INTRAVENOUS CONTINUOUS
Status: DISCONTINUED | OUTPATIENT
Start: 2020-07-14 | End: 2020-07-14 | Stop reason: HOSPADM

## 2020-07-14 RX ORDER — HALOPERIDOL 5 MG/ML
1 INJECTION INTRAMUSCULAR
Status: DISCONTINUED | OUTPATIENT
Start: 2020-07-14 | End: 2020-07-14 | Stop reason: HOSPADM

## 2020-07-14 RX ORDER — LIDOCAINE HYDROCHLORIDE 20 MG/ML
INJECTION, SOLUTION EPIDURAL; INFILTRATION; INTRACAUDAL; PERINEURAL PRN
Status: DISCONTINUED | OUTPATIENT
Start: 2020-07-14 | End: 2020-07-14 | Stop reason: SURG

## 2020-07-14 RX ORDER — MIDAZOLAM HYDROCHLORIDE 1 MG/ML
1 INJECTION INTRAMUSCULAR; INTRAVENOUS
Status: DISCONTINUED | OUTPATIENT
Start: 2020-07-14 | End: 2020-07-14 | Stop reason: HOSPADM

## 2020-07-14 RX ORDER — MEPERIDINE HYDROCHLORIDE 25 MG/ML
12.5 INJECTION INTRAMUSCULAR; INTRAVENOUS; SUBCUTANEOUS
Status: DISCONTINUED | OUTPATIENT
Start: 2020-07-14 | End: 2020-07-14 | Stop reason: HOSPADM

## 2020-07-14 RX ORDER — BUPIVACAINE HYDROCHLORIDE 2.5 MG/ML
INJECTION, SOLUTION EPIDURAL; INFILTRATION; INTRACAUDAL
Status: DISCONTINUED | OUTPATIENT
Start: 2020-07-14 | End: 2020-07-14 | Stop reason: HOSPADM

## 2020-07-14 RX ORDER — BUPIVACAINE HYDROCHLORIDE 2.5 MG/ML
INJECTION, SOLUTION EPIDURAL; INFILTRATION; INTRACAUDAL
Status: DISCONTINUED
Start: 2020-07-14 | End: 2020-07-14 | Stop reason: HOSPADM

## 2020-07-14 RX ORDER — KETOROLAC TROMETHAMINE 30 MG/ML
INJECTION, SOLUTION INTRAMUSCULAR; INTRAVENOUS PRN
Status: DISCONTINUED | OUTPATIENT
Start: 2020-07-14 | End: 2020-07-14 | Stop reason: SURG

## 2020-07-14 RX ORDER — HYDROCODONE BITARTRATE AND ACETAMINOPHEN 5; 325 MG/1; MG/1
1-2 TABLET ORAL EVERY 6 HOURS PRN
Status: DISCONTINUED | OUTPATIENT
Start: 2020-07-14 | End: 2020-07-14 | Stop reason: HOSPADM

## 2020-07-14 RX ORDER — SODIUM CHLORIDE 9 MG/ML
INJECTION, SOLUTION INTRAVENOUS CONTINUOUS
Status: DISCONTINUED | OUTPATIENT
Start: 2020-07-14 | End: 2020-07-14 | Stop reason: HOSPADM

## 2020-07-14 RX ADMIN — DEXAMETHASONE SODIUM PHOSPHATE 4 MG: 4 INJECTION, SOLUTION INTRA-ARTICULAR; INTRALESIONAL; INTRAMUSCULAR; INTRAVENOUS; SOFT TISSUE at 11:43

## 2020-07-14 RX ADMIN — KETOROLAC TROMETHAMINE 30 MG: 30 INJECTION, SOLUTION INTRAMUSCULAR at 11:43

## 2020-07-14 RX ADMIN — FENTANYL CITRATE 100 MCG: 50 INJECTION INTRAMUSCULAR; INTRAVENOUS at 11:30

## 2020-07-14 RX ADMIN — PROPOFOL 180 MG: 10 INJECTION, EMULSION INTRAVENOUS at 11:32

## 2020-07-14 RX ADMIN — SODIUM CHLORIDE, POTASSIUM CHLORIDE, SODIUM LACTATE AND CALCIUM CHLORIDE: 600; 310; 30; 20 INJECTION, SOLUTION INTRAVENOUS at 10:15

## 2020-07-14 RX ADMIN — POVIDONE-IODINE 15 ML: 10 SOLUTION TOPICAL at 10:15

## 2020-07-14 RX ADMIN — CEFAZOLIN 2.7 G: 330 INJECTION, POWDER, FOR SOLUTION INTRAMUSCULAR; INTRAVENOUS at 11:39

## 2020-07-14 RX ADMIN — LIDOCAINE HYDROCHLORIDE 50 MG: 20 INJECTION, SOLUTION EPIDURAL; INFILTRATION; INTRACAUDAL at 11:32

## 2020-07-14 RX ADMIN — ONDANSETRON 4 MG: 2 INJECTION INTRAMUSCULAR; INTRAVENOUS at 11:43

## 2020-07-14 ASSESSMENT — PAIN SCALES - GENERAL: PAIN_LEVEL: 0

## 2020-07-14 ASSESSMENT — FIBROSIS 4 INDEX: FIB4 SCORE: 0.41

## 2020-07-14 NOTE — DISCHARGE INSTRUCTIONS
ACTIVITY: Rest and take it easy for the first 24 hours.  A responsible adult is recommended to remain with you during that time.  It is normal to feel sleepy.  We encourage you to not do anything that requires balance, judgment or coordination.    MILD FLU-LIKE SYMPTOMS ARE NORMAL. YOU MAY EXPERIENCE GENERALIZED MUSCLE ACHES, THROAT IRRITATION, HEADACHE AND/OR SOME NAUSEA.    FOR 24 HOURS DO NOT:  Drive, operate machinery or run household appliances.  Drink beer or alcoholic beverages.   Make important decisions or sign legal documents.    SPECIAL INSTRUCTIONS: Follow Dr. Moura's orders.  Follow up in her office 7/22 or 7/24, call to schedule.      DIET: To avoid nausea, slowly advance diet as tolerated, avoiding spicy or greasy foods for the first day.  Add more substantial food to your diet according to your physician's instructions.  Babies can be fed formula or breast milk as soon as they are hungry.  INCREASE FLUIDS AND FIBER TO AVOID CONSTIPATION.    SURGICAL DRESSING/BATHING: *Keep dressing clean, dry and in place, may remove dressing 7/16    FOLLOW-UP APPOINTMENT:  A follow-up appointment should be arranged with your doctor,  call to schedule.    You should CALL YOUR PHYSICIAN if you develop:  Fever greater than 101 degrees F.  Pain not relieved by medication, or persistent nausea or vomiting.  Excessive bleeding (blood soaking through dressing) or unexpected drainage from the wound.  Extreme redness or swelling around the incision site, drainage of pus or foul smelling drainage.  Inability to urinate or empty your bladder within 8 hours.  Problems with breathing or chest pain.    You should call 911 if you develop problems with breathing or chest pain.  If you are unable to contact your doctor or surgical center, you should go to the nearest emergency room or urgent care center.  Physician's telephone #: Dr. Moura [phone]    If any questions arise, call your doctor.  If your doctor is not available,  please feel free to call the Surgical Center at [phone].  The Center is open Monday through Friday from 7AM to 7PM.  You can also call the HEALTH HOTLINE open 24 hours/day, 7 days/week and speak to a nurse at [phone], or toll free at [phone].    A registered nurse may call you a few days after your surgery to see how you are doing after your procedure.    MEDICATIONS: Resume taking daily medication.  Take prescribed pain medication with food.  If no medication is prescribed, you may take non-aspirin pain medication if needed.  PAIN MEDICATION CAN BE VERY CONSTIPATING.  Take a stool softener or laxative such as senokot, pericolace, or milk of magnesia if needed.    Prescription given for None.  Last pain medication given at *NONE**.    If your physician has prescribed pain medication that includes Acetaminophen (Tylenol), do not take additional Acetaminophen (Tylenol) while taking the prescribed medication.    Depression / Suicide Risk    As you are discharged from this [facility] facility, it is important to learn how to keep safe from harming yourself.    Recognize the warning signs:  · Abrupt changes in personality, positive or negative- including increase in energy   · Giving away possessions  · Change in eating patterns- significant weight changes-  positive or negative  · Change in sleeping patterns- unable to sleep or sleeping all the time   · Unwillingness or inability to communicate  · Depression  · Unusual sadness, discouragement and loneliness  · Talk of wanting to die  · Neglect of personal appearance   · Rebelliousness- reckless behavior  · Withdrawal from people/activities they love  · Confusion- inability to concentrate     If you or a loved one observes any of these behaviors or has concerns about self-harm, here's what you can do:  · Talk about it- your feelings and reasons for harming yourself  · Remove any means that you might use to hurt yourself (examples: pills, rope,  extension cords, firearm)  · Get professional help from the community (Mental Health, Substance Abuse, psychological counseling)  · Do not be alone:Call your Safe Contact- someone whom you trust who will be there for you.  · Call your local CRISIS HOTLINE 868-3436 or 208-613-5269  · Call your local Children's Mobile Crisis Response Team [place] (424) 800-0848 or www.Aasonn  · Call the toll free National Suicide Prevention Hotlines   · National Suicide Prevention Lifeline 455-773-HYGS (2336)  · National Hope Line Network 800-SUICIDE (143-0601)

## 2020-07-14 NOTE — ANESTHESIA POSTPROCEDURE EVALUATION
Patient: Napoleon Holbrook Zendejas III    Procedure Summary     Date:  07/14/20 Room / Location:  [facility] ROOM 25 / SURGERY SAME DAY [facility]    Anesthesia Start:  1128 Anesthesia Stop:  1158    Procedure:  INSERTION, CATHETER- PORT (Chest) Diagnosis:  (DIFFUSE LARGE B CELL LYMPHOMA)    Surgeon:  Montse Moura M.D. Responsible Provider:  Judd Yates M.D.    Anesthesia Type:  general ASA Status:  2          Final Anesthesia Type: general  Last vitals  BP   Blood Pressure: (!) 98/67    Temp   36.2 °C (97.2 °F)    Pulse   Pulse: (!) 50   Resp   12    SpO2   98 %      Anesthesia Post Evaluation    Patient location during evaluation: PACU  Patient participation: complete - patient participated  Level of consciousness: awake and alert  Pain score: 0    Airway patency: patent  Anesthetic complications: no  Cardiovascular status: hemodynamically stable  Respiratory status: acceptable  Hydration status: euvolemic    PONV: none           Nurse Pain Score: 0 (NPRS)

## 2020-07-14 NOTE — OP REPORT
DATE OF SERVICE:  07/14/2020    PREOPERATIVE DIAGNOSIS:  High-grade lymphoma.    POSTOPERATIVE DIAGNOSIS:  High-grade lymphoma.    PROCEDURE:  Port-A-Cath placement, 8-Serbian, MRI compatible PowerPort in the   left subclavian vein using fluoroscopy.    SURGEON:  Montse Moura MD    ANESTHESIA:  Laryngeal mask.    ANESTHESIOLOGIST:  Judd Yates MD    INDICATIONS:  The patient is a 65-year-old male who presented approximately 3   weeks ago with severe abdominal pain, was found to have a perforated stomach   with large spleen.  Ultimately, splenectomy and partial gastrectomy were   performed and demonstrated high-grade lymphoma.  He is now being brought at   this time for port placement.    FINDINGS:  Port was placed in left subclavian vein using fluoroscopy, tip was   noted to be in the superior vena cava right atrial junction, flushed and magen   easily, it was not left accessed.    PROCEDURE:  The patient was identified and consented.  He was brought to the   operating room, placed in supine position.  Patient underwent laryngeal mask   anesthetic clearance.  Patient's chest prepped and draped in sterile fashion.    An 18-gauge thin-walled needle was introduced into left subclavian vein.    Wire was passed.  Under fluoroscopy, it was noted to be in the right   ventricle.  The anterior chest wall anesthetized with 0.25% Marcaine.  A   subcutaneous pocket was performed on the anterior chest wall.  Port was   brought in, sewn in with 2-0 Prolene.  Catheter was passed up the insertion   site, it was cut to fit the patient.  Dilator and sheath were passed on the   wire.  Wire and dilator removed and the catheter was passed down the peel-away   sheath, which was also removed.  Once that was completed, the catheter   flushed and magen easily.  The port site was closed with 3-0 Vicryl   subcutaneous layer and skin was closed with running 4-0 in subcuticular   fashion.  Op-Site dressing placed on the wound.  Patient  was extubated and   taken to recovery in stable condition.  All sponge, needle counts were   correct.       ____________________________________     MD TAZ RICE / ERAN    DD:  07/14/2020 11:51:04  DT:  07/14/2020 14:06:18    D#:  [id number]  Job#:  [id number]    cc: SCARLETT DURAN MD, Galina James MD

## 2020-07-14 NOTE — ANESTHESIA TIME REPORT
Anesthesia Start and Stop Event Times     Date Time Event    7/14/2020 1119 Ready for Procedure     1128 Anesthesia Start     1158 Anesthesia Stop        Responsible Staff  07/14/20    Name Role Begin End    Judd Yates M.D. Anesth 1128 1158        Preop Diagnosis (Free Text):  Pre-op Diagnosis     DIFFUSE LARGE B CELL LYMPHOMA        Preop Diagnosis (Codes):    Post op Diagnosis  Lymphoma (HCC)      Premium Reason  Non-Premium    Comments:

## 2020-07-14 NOTE — OR NURSING
1155 - Pt to PACU 12 from OR.  VSS, oral airway in place, 4L mask, sinus bradycardia 50s.  Bedside report from RN and Anesthesiologist.  With dressing over L. Chest site - gauze and Tegaderm. No obvious drainage noted or swelling.     1210-airway removed.    1220- chest xray complete. Sitting up in bed, sips of water given. Room air, VSS.  No pain or nausea reported.    1247-  PT stable to discharge to Phase II.  VSS, room air, no pain or nausea.  Updated pt's daughter.  Report to JENNIFER Marrero.  Pt able to ambulate to chair.

## 2020-07-14 NOTE — ANESTHESIA PROCEDURE NOTES
Airway    Date/Time: 7/14/2020 11:33 AM  Performed by: Judd Yates M.D.  Authorized by: Judd Yates M.D.     Location:  OR  Urgency:  Elective  Difficult Airway: No    Indications for Airway Management:  Anesthesia      Spontaneous Ventilation: absent    Sedation Level:  Deep  Preoxygenated: Yes    Final Airway Type:  Supraglottic airway  Final Supraglottic Airway:  Standard LMA    SGA Size:  4  Number of Attempts at Approach:  1  Number of Other Approaches Attempted:  0

## 2020-07-14 NOTE — OR NURSING
1247 Received from pacu 1, surgiacl dressing CDI. No c/o pain or n/v.    Pt is setting in recliner chair.      1300 Pt feels ready to go home.  Called PT daughter and dc instructions completed with her.    Steady gait, and pt has all belongings.        1338 Dc to car via wheel chair.

## 2020-07-21 ENCOUNTER — HOSPITAL ENCOUNTER (OUTPATIENT)
Dept: LAB | Facility: MEDICAL CENTER | Age: 65
End: 2020-07-21
Attending: INTERNAL MEDICINE
Payer: COMMERCIAL

## 2020-07-21 LAB
ALBUMIN SERPL BCP-MCNC: 4.1 G/DL (ref 3.2–4.9)
ALBUMIN/GLOB SERPL: 1.3 G/DL
ALP SERPL-CCNC: 84 U/L (ref 30–99)
ALT SERPL-CCNC: 17 U/L (ref 2–50)
ANION GAP SERPL CALC-SCNC: 13 MMOL/L (ref 7–16)
AST SERPL-CCNC: 10 U/L (ref 12–45)
BASOPHILS # BLD AUTO: 1.2 % (ref 0–1.8)
BASOPHILS # BLD: 0.13 K/UL (ref 0–0.12)
BILIRUB SERPL-MCNC: 0.2 MG/DL (ref 0.1–1.5)
BUN SERPL-MCNC: 17 MG/DL (ref 8–22)
CALCIUM SERPL-MCNC: 9.8 MG/DL (ref 8.5–10.5)
CHLORIDE SERPL-SCNC: 103 MMOL/L (ref 96–112)
CO2 SERPL-SCNC: 23 MMOL/L (ref 20–33)
CREAT SERPL-MCNC: 0.74 MG/DL (ref 0.5–1.4)
EOSINOPHIL # BLD AUTO: 0.27 K/UL (ref 0–0.51)
EOSINOPHIL NFR BLD: 2.5 % (ref 0–6.9)
ERYTHROCYTE [DISTWIDTH] IN BLOOD BY AUTOMATED COUNT: 62.6 FL (ref 35.9–50)
FERRITIN SERPL-MCNC: 515 NG/ML (ref 22–322)
GLOBULIN SER CALC-MCNC: 3.2 G/DL (ref 1.9–3.5)
GLUCOSE SERPL-MCNC: 90 MG/DL (ref 65–99)
HAV IGM SERPL QL IA: NORMAL
HBV CORE IGM SER QL: NORMAL
HBV SURFACE AG SER QL: NORMAL
HCT VFR BLD AUTO: 41.6 % (ref 42–52)
HCV AB SER QL: NORMAL
HGB BLD-MCNC: 12.5 G/DL (ref 14–18)
HIV 1+2 AB+HIV1 P24 AG SERPL QL IA: NORMAL
IMM GRANULOCYTES # BLD AUTO: 0.07 K/UL (ref 0–0.11)
IMM GRANULOCYTES NFR BLD AUTO: 0.6 % (ref 0–0.9)
LDH SERPL L TO P-CCNC: 244 U/L (ref 107–266)
LYMPHOCYTES # BLD AUTO: 3.43 K/UL (ref 1–4.8)
LYMPHOCYTES NFR BLD: 31.5 % (ref 22–41)
MCH RBC QN AUTO: 29.4 PG (ref 27–33)
MCHC RBC AUTO-ENTMCNC: 30 G/DL (ref 33.7–35.3)
MCV RBC AUTO: 97.9 FL (ref 81.4–97.8)
MONOCYTES # BLD AUTO: 1.11 K/UL (ref 0–0.85)
MONOCYTES NFR BLD AUTO: 10.2 % (ref 0–13.4)
NEUTROPHILS # BLD AUTO: 5.87 K/UL (ref 1.82–7.42)
NEUTROPHILS NFR BLD: 54 % (ref 44–72)
NRBC # BLD AUTO: 0 K/UL
NRBC BLD-RTO: 0 /100 WBC
PLATELET # BLD AUTO: 543 K/UL (ref 164–446)
PMV BLD AUTO: 9.7 FL (ref 9–12.9)
POTASSIUM SERPL-SCNC: 4 MMOL/L (ref 3.6–5.5)
PROT SERPL-MCNC: 7.3 G/DL (ref 6–8.2)
RBC # BLD AUTO: 4.25 M/UL (ref 4.7–6.1)
SODIUM SERPL-SCNC: 139 MMOL/L (ref 135–145)
URATE SERPL-MCNC: 5.9 MG/DL (ref 2.5–8.3)
WBC # BLD AUTO: 10.9 K/UL (ref 4.8–10.8)

## 2020-07-21 PROCEDURE — 36415 COLL VENOUS BLD VENIPUNCTURE: CPT

## 2020-07-21 PROCEDURE — 87389 HIV-1 AG W/HIV-1&-2 AB AG IA: CPT

## 2020-07-21 PROCEDURE — 80074 ACUTE HEPATITIS PANEL: CPT

## 2020-07-21 PROCEDURE — 84550 ASSAY OF BLOOD/URIC ACID: CPT

## 2020-07-21 PROCEDURE — 85025 COMPLETE CBC W/AUTO DIFF WBC: CPT

## 2020-07-21 PROCEDURE — 83615 LACTATE (LD) (LDH) ENZYME: CPT

## 2020-07-21 PROCEDURE — 82728 ASSAY OF FERRITIN: CPT

## 2020-07-21 PROCEDURE — 80053 COMPREHEN METABOLIC PANEL: CPT

## 2020-07-23 ENCOUNTER — HOSPITAL ENCOUNTER (OUTPATIENT)
Dept: RADIOLOGY | Facility: MEDICAL CENTER | Age: 65
End: 2020-07-23
Attending: INTERNAL MEDICINE
Payer: COMMERCIAL

## 2020-07-23 DIAGNOSIS — C83.33 RETICULOSARCOMA OF INTRA-ABDOMINAL LYMPH NODES (HCC): ICD-10-CM

## 2020-07-24 ENCOUNTER — HOSPITAL ENCOUNTER (OUTPATIENT)
Dept: RADIOLOGY | Facility: MEDICAL CENTER | Age: 65
End: 2020-07-24
Attending: INTERNAL MEDICINE
Payer: COMMERCIAL

## 2020-07-24 PROCEDURE — A9552 F18 FDG: HCPCS

## 2020-08-10 ENCOUNTER — HOSPITAL ENCOUNTER (OUTPATIENT)
Dept: LAB | Facility: MEDICAL CENTER | Age: 65
End: 2020-08-10
Attending: NURSE PRACTITIONER
Payer: COMMERCIAL

## 2020-08-10 LAB
ALBUMIN SERPL BCP-MCNC: 4.6 G/DL (ref 3.2–4.9)
ALBUMIN/GLOB SERPL: 1.8 G/DL
ALP SERPL-CCNC: 91 U/L (ref 30–99)
ALT SERPL-CCNC: 22 U/L (ref 2–50)
ANION GAP SERPL CALC-SCNC: 14 MMOL/L (ref 7–16)
AST SERPL-CCNC: 19 U/L (ref 12–45)
BILIRUB SERPL-MCNC: 0.2 MG/DL (ref 0.1–1.5)
BUN SERPL-MCNC: 12 MG/DL (ref 8–22)
CALCIUM SERPL-MCNC: 9.8 MG/DL (ref 8.5–10.5)
CHLORIDE SERPL-SCNC: 101 MMOL/L (ref 96–112)
CO2 SERPL-SCNC: 25 MMOL/L (ref 20–33)
CREAT SERPL-MCNC: 0.76 MG/DL (ref 0.5–1.4)
GLOBULIN SER CALC-MCNC: 2.6 G/DL (ref 1.9–3.5)
GLUCOSE SERPL-MCNC: 86 MG/DL (ref 65–99)
PHOSPHATE SERPL-MCNC: 3.6 MG/DL (ref 2.5–4.5)
POTASSIUM SERPL-SCNC: 4.4 MMOL/L (ref 3.6–5.5)
PROT SERPL-MCNC: 7.2 G/DL (ref 6–8.2)
SODIUM SERPL-SCNC: 140 MMOL/L (ref 135–145)

## 2020-08-10 PROCEDURE — 80053 COMPREHEN METABOLIC PANEL: CPT

## 2020-08-10 PROCEDURE — 84100 ASSAY OF PHOSPHORUS: CPT

## 2020-08-19 ENCOUNTER — HOSPITAL ENCOUNTER (OUTPATIENT)
Facility: MEDICAL CENTER | Age: 65
End: 2020-08-19
Attending: NURSE PRACTITIONER
Payer: COMMERCIAL

## 2020-08-19 LAB
ALBUMIN SERPL BCP-MCNC: 4.6 G/DL (ref 3.2–4.9)
ALBUMIN/GLOB SERPL: 1.8 G/DL
ALP SERPL-CCNC: 138 U/L (ref 30–99)
ALT SERPL-CCNC: 20 U/L (ref 2–50)
ANION GAP SERPL CALC-SCNC: 13 MMOL/L (ref 7–16)
AST SERPL-CCNC: 13 U/L (ref 12–45)
BILIRUB SERPL-MCNC: 0.2 MG/DL (ref 0.1–1.5)
BUN SERPL-MCNC: 18 MG/DL (ref 8–22)
CALCIUM SERPL-MCNC: 10.1 MG/DL (ref 8.5–10.5)
CHLORIDE SERPL-SCNC: 98 MMOL/L (ref 96–112)
CO2 SERPL-SCNC: 25 MMOL/L (ref 20–33)
CREAT SERPL-MCNC: 0.76 MG/DL (ref 0.5–1.4)
GLOBULIN SER CALC-MCNC: 2.6 G/DL (ref 1.9–3.5)
GLUCOSE SERPL-MCNC: 100 MG/DL (ref 65–99)
LDH SERPL L TO P-CCNC: 207 U/L (ref 107–266)
PHOSPHATE SERPL-MCNC: 4.4 MG/DL (ref 2.5–4.5)
POTASSIUM SERPL-SCNC: 4.7 MMOL/L (ref 3.6–5.5)
PROT SERPL-MCNC: 7.2 G/DL (ref 6–8.2)
SODIUM SERPL-SCNC: 136 MMOL/L (ref 135–145)
URATE SERPL-MCNC: 4 MG/DL (ref 2.5–8.3)

## 2020-08-19 PROCEDURE — 83615 LACTATE (LD) (LDH) ENZYME: CPT

## 2020-08-19 PROCEDURE — 84100 ASSAY OF PHOSPHORUS: CPT

## 2020-08-19 PROCEDURE — 84550 ASSAY OF BLOOD/URIC ACID: CPT

## 2020-08-19 PROCEDURE — 80053 COMPREHEN METABOLIC PANEL: CPT

## 2020-09-14 ENCOUNTER — HOSPITAL ENCOUNTER (OUTPATIENT)
Dept: LAB | Facility: MEDICAL CENTER | Age: 65
End: 2020-09-14
Attending: NURSE PRACTITIONER
Payer: COMMERCIAL

## 2020-09-14 LAB
ALBUMIN SERPL BCP-MCNC: 4.2 G/DL (ref 3.2–4.9)
ALBUMIN/GLOB SERPL: 1.8 G/DL
ALP SERPL-CCNC: 125 U/L (ref 30–99)
ALT SERPL-CCNC: 18 U/L (ref 2–50)
ANION GAP SERPL CALC-SCNC: 19 MMOL/L (ref 7–16)
AST SERPL-CCNC: 18 U/L (ref 12–45)
BILIRUB SERPL-MCNC: <0.2 MG/DL (ref 0.1–1.5)
BUN SERPL-MCNC: 17 MG/DL (ref 8–22)
CALCIUM SERPL-MCNC: 9.5 MG/DL (ref 8.5–10.5)
CHLORIDE SERPL-SCNC: 100 MMOL/L (ref 96–112)
CO2 SERPL-SCNC: 19 MMOL/L (ref 20–33)
CREAT SERPL-MCNC: 0.66 MG/DL (ref 0.5–1.4)
GLOBULIN SER CALC-MCNC: 2.4 G/DL (ref 1.9–3.5)
GLUCOSE SERPL-MCNC: 93 MG/DL (ref 65–99)
LDH SERPL L TO P-CCNC: 256 U/L (ref 107–266)
POTASSIUM SERPL-SCNC: 4.6 MMOL/L (ref 3.6–5.5)
PROT SERPL-MCNC: 6.6 G/DL (ref 6–8.2)
SODIUM SERPL-SCNC: 138 MMOL/L (ref 135–145)
URATE SERPL-MCNC: 4.8 MG/DL (ref 2.5–8.3)

## 2020-09-14 PROCEDURE — 80053 COMPREHEN METABOLIC PANEL: CPT

## 2020-09-14 PROCEDURE — 83615 LACTATE (LD) (LDH) ENZYME: CPT

## 2020-09-14 PROCEDURE — 84550 ASSAY OF BLOOD/URIC ACID: CPT

## 2020-09-24 ENCOUNTER — HOSPITAL ENCOUNTER (OUTPATIENT)
Dept: LAB | Facility: MEDICAL CENTER | Age: 65
End: 2020-09-24
Attending: INTERNAL MEDICINE
Payer: COMMERCIAL

## 2020-09-24 LAB
LDH SERPL L TO P-CCNC: 269 U/L (ref 107–266)
URATE SERPL-MCNC: 3.7 MG/DL (ref 2.5–8.3)

## 2020-09-24 PROCEDURE — 83615 LACTATE (LD) (LDH) ENZYME: CPT

## 2020-09-24 PROCEDURE — 84550 ASSAY OF BLOOD/URIC ACID: CPT

## 2020-09-24 PROCEDURE — 80053 COMPREHEN METABOLIC PANEL: CPT

## 2020-09-25 LAB
ALBUMIN SERPL BCP-MCNC: 4.1 G/DL (ref 3.2–4.9)
ALBUMIN/GLOB SERPL: 1.4 G/DL
ALP SERPL-CCNC: 84 U/L (ref 30–99)
ALT SERPL-CCNC: 24 U/L (ref 2–50)
ANION GAP SERPL CALC-SCNC: 8 MMOL/L (ref 7–16)
AST SERPL-CCNC: 24 U/L (ref 12–45)
BILIRUB SERPL-MCNC: 0.2 MG/DL (ref 0.1–1.5)
BUN SERPL-MCNC: 16 MG/DL (ref 8–22)
CALCIUM SERPL-MCNC: 9.5 MG/DL (ref 8.5–10.5)
CHLORIDE SERPL-SCNC: 101 MMOL/L (ref 96–112)
CO2 SERPL-SCNC: 23 MMOL/L (ref 20–33)
CREAT SERPL-MCNC: 0.77 MG/DL (ref 0.5–1.4)
GLOBULIN SER CALC-MCNC: 3 G/DL (ref 1.9–3.5)
GLUCOSE SERPL-MCNC: 131 MG/DL (ref 65–99)
POTASSIUM SERPL-SCNC: 5.1 MMOL/L (ref 3.6–5.5)
PROT SERPL-MCNC: 7.1 G/DL (ref 6–8.2)
SODIUM SERPL-SCNC: 132 MMOL/L (ref 135–145)

## 2020-10-15 ENCOUNTER — HOSPITAL ENCOUNTER (OUTPATIENT)
Facility: MEDICAL CENTER | Age: 65
End: 2020-10-15
Attending: INTERNAL MEDICINE
Payer: COMMERCIAL

## 2020-10-15 PROCEDURE — 84550 ASSAY OF BLOOD/URIC ACID: CPT

## 2020-10-15 PROCEDURE — 80053 COMPREHEN METABOLIC PANEL: CPT

## 2020-10-15 PROCEDURE — 83615 LACTATE (LD) (LDH) ENZYME: CPT

## 2020-10-16 LAB
ALBUMIN SERPL BCP-MCNC: 4.3 G/DL (ref 3.2–4.9)
ALBUMIN/GLOB SERPL: 1.4 G/DL
ALP SERPL-CCNC: 89 U/L (ref 30–99)
ALT SERPL-CCNC: 21 U/L (ref 2–50)
ANION GAP SERPL CALC-SCNC: 11 MMOL/L (ref 7–16)
AST SERPL-CCNC: 16 U/L (ref 12–45)
BILIRUB SERPL-MCNC: 0.3 MG/DL (ref 0.1–1.5)
BUN SERPL-MCNC: 17 MG/DL (ref 8–22)
CALCIUM SERPL-MCNC: 9.8 MG/DL (ref 8.5–10.5)
CHLORIDE SERPL-SCNC: 101 MMOL/L (ref 96–112)
CO2 SERPL-SCNC: 26 MMOL/L (ref 20–33)
CREAT SERPL-MCNC: 0.76 MG/DL (ref 0.5–1.4)
GLOBULIN SER CALC-MCNC: 3 G/DL (ref 1.9–3.5)
GLUCOSE SERPL-MCNC: 86 MG/DL (ref 65–99)
LDH SERPL L TO P-CCNC: 312 U/L (ref 107–266)
POTASSIUM SERPL-SCNC: 4.4 MMOL/L (ref 3.6–5.5)
PROT SERPL-MCNC: 7.3 G/DL (ref 6–8.2)
SODIUM SERPL-SCNC: 138 MMOL/L (ref 135–145)
URATE SERPL-MCNC: 5.7 MG/DL (ref 2.5–8.3)

## 2020-11-05 ENCOUNTER — HOSPITAL ENCOUNTER (OUTPATIENT)
Facility: MEDICAL CENTER | Age: 65
End: 2020-11-05
Attending: INTERNAL MEDICINE
Payer: COMMERCIAL

## 2020-11-05 PROCEDURE — 80053 COMPREHEN METABOLIC PANEL: CPT

## 2020-11-05 PROCEDURE — 83615 LACTATE (LD) (LDH) ENZYME: CPT

## 2020-11-05 PROCEDURE — 84550 ASSAY OF BLOOD/URIC ACID: CPT

## 2020-11-06 LAB
ALBUMIN SERPL BCP-MCNC: 4.2 G/DL (ref 3.2–4.9)
ALBUMIN/GLOB SERPL: 1.5 G/DL
ALP SERPL-CCNC: 79 U/L (ref 30–99)
ALT SERPL-CCNC: 24 U/L (ref 2–50)
ANION GAP SERPL CALC-SCNC: 14 MMOL/L (ref 7–16)
AST SERPL-CCNC: 31 U/L (ref 12–45)
BILIRUB SERPL-MCNC: 0.4 MG/DL (ref 0.1–1.5)
BUN SERPL-MCNC: 17 MG/DL (ref 8–22)
CALCIUM SERPL-MCNC: 9.6 MG/DL (ref 8.5–10.5)
CHLORIDE SERPL-SCNC: 104 MMOL/L (ref 96–112)
CO2 SERPL-SCNC: 23 MMOL/L (ref 20–33)
CREAT SERPL-MCNC: 0.72 MG/DL (ref 0.5–1.4)
GLOBULIN SER CALC-MCNC: 2.8 G/DL (ref 1.9–3.5)
GLUCOSE SERPL-MCNC: 82 MG/DL (ref 65–99)
LDH SERPL L TO P-CCNC: 255 U/L (ref 107–266)
POTASSIUM SERPL-SCNC: 4.5 MMOL/L (ref 3.6–5.5)
PROT SERPL-MCNC: 7 G/DL (ref 6–8.2)
SODIUM SERPL-SCNC: 141 MMOL/L (ref 135–145)
URATE SERPL-MCNC: 5.9 MG/DL (ref 2.5–8.3)

## 2021-01-06 ENCOUNTER — HOSPITAL ENCOUNTER (OUTPATIENT)
Facility: MEDICAL CENTER | Age: 66
End: 2021-01-06
Attending: INTERNAL MEDICINE
Payer: COMMERCIAL

## 2021-01-06 PROCEDURE — 80053 COMPREHEN METABOLIC PANEL: CPT

## 2021-01-06 PROCEDURE — 83615 LACTATE (LD) (LDH) ENZYME: CPT

## 2021-01-07 LAB
ALBUMIN SERPL BCP-MCNC: 4.2 G/DL (ref 3.2–4.9)
ALBUMIN/GLOB SERPL: 1.8 G/DL
ALP SERPL-CCNC: 84 U/L (ref 30–99)
ALT SERPL-CCNC: 18 U/L (ref 2–50)
ANION GAP SERPL CALC-SCNC: 11 MMOL/L (ref 7–16)
AST SERPL-CCNC: 19 U/L (ref 12–45)
BILIRUB SERPL-MCNC: 0.3 MG/DL (ref 0.1–1.5)
BUN SERPL-MCNC: 12 MG/DL (ref 8–22)
CALCIUM SERPL-MCNC: 9.2 MG/DL (ref 8.5–10.5)
CHLORIDE SERPL-SCNC: 104 MMOL/L (ref 96–112)
CO2 SERPL-SCNC: 24 MMOL/L (ref 20–33)
CREAT SERPL-MCNC: 0.65 MG/DL (ref 0.5–1.4)
GLOBULIN SER CALC-MCNC: 2.4 G/DL (ref 1.9–3.5)
GLUCOSE SERPL-MCNC: 89 MG/DL (ref 65–99)
LDH SERPL L TO P-CCNC: 248 U/L (ref 107–266)
POTASSIUM SERPL-SCNC: 4.4 MMOL/L (ref 3.6–5.5)
PROT SERPL-MCNC: 6.6 G/DL (ref 6–8.2)
SODIUM SERPL-SCNC: 139 MMOL/L (ref 135–145)

## 2021-04-20 ENCOUNTER — PRE-ADMISSION TESTING (OUTPATIENT)
Dept: ADMISSIONS | Facility: MEDICAL CENTER | Age: 66
End: 2021-04-20
Attending: SURGERY
Payer: COMMERCIAL

## 2021-04-20 DIAGNOSIS — Z01.812 PRE-OPERATIVE LABORATORY EXAMINATION: ICD-10-CM

## 2021-04-20 DIAGNOSIS — Z01.810 PRE-OPERATIVE CARDIOVASCULAR EXAMINATION: ICD-10-CM

## 2021-04-20 LAB
ANION GAP SERPL CALC-SCNC: 9 MMOL/L (ref 7–16)
BUN SERPL-MCNC: 19 MG/DL (ref 8–22)
CALCIUM SERPL-MCNC: 9.4 MG/DL (ref 8.5–10.5)
CHLORIDE SERPL-SCNC: 100 MMOL/L (ref 96–112)
CO2 SERPL-SCNC: 26 MMOL/L (ref 20–33)
CREAT SERPL-MCNC: 0.8 MG/DL (ref 0.5–1.4)
EKG IMPRESSION: NORMAL
ERYTHROCYTE [DISTWIDTH] IN BLOOD BY AUTOMATED COUNT: 47.8 FL (ref 35.9–50)
GLUCOSE SERPL-MCNC: 93 MG/DL (ref 65–99)
HCT VFR BLD AUTO: 47.3 % (ref 42–52)
HGB BLD-MCNC: 15.7 G/DL (ref 14–18)
MCH RBC QN AUTO: 30.5 PG (ref 27–33)
MCHC RBC AUTO-ENTMCNC: 33.2 G/DL (ref 33.7–35.3)
MCV RBC AUTO: 92 FL (ref 81.4–97.8)
PLATELET # BLD AUTO: 451 K/UL (ref 164–446)
PMV BLD AUTO: 10 FL (ref 9–12.9)
POTASSIUM SERPL-SCNC: 4 MMOL/L (ref 3.6–5.5)
RBC # BLD AUTO: 5.14 M/UL (ref 4.7–6.1)
SARS-COV-2 RNA RESP QL NAA+PROBE: NOTDETECTED
SODIUM SERPL-SCNC: 135 MMOL/L (ref 135–145)
SPECIMEN SOURCE: NORMAL
WBC # BLD AUTO: 6.6 K/UL (ref 4.8–10.8)

## 2021-04-20 PROCEDURE — 36415 COLL VENOUS BLD VENIPUNCTURE: CPT

## 2021-04-20 PROCEDURE — 93005 ELECTROCARDIOGRAM TRACING: CPT

## 2021-04-20 PROCEDURE — C9803 HOPD COVID-19 SPEC COLLECT: HCPCS

## 2021-04-20 PROCEDURE — 85027 COMPLETE CBC AUTOMATED: CPT

## 2021-04-20 PROCEDURE — 80048 BASIC METABOLIC PNL TOTAL CA: CPT

## 2021-04-20 PROCEDURE — U0005 INFEC AGEN DETEC AMPLI PROBE: HCPCS

## 2021-04-20 PROCEDURE — U0003 INFECTIOUS AGENT DETECTION BY NUCLEIC ACID (DNA OR RNA); SEVERE ACUTE RESPIRATORY SYNDROME CORONAVIRUS 2 (SARS-COV-2) (CORONAVIRUS DISEASE [COVID-19]), AMPLIFIED PROBE TECHNIQUE, MAKING USE OF HIGH THROUGHPUT TECHNOLOGIES AS DESCRIBED BY CMS-2020-01-R: HCPCS

## 2021-04-20 PROCEDURE — 93010 ELECTROCARDIOGRAM REPORT: CPT | Performed by: INTERNAL MEDICINE

## 2021-04-20 ASSESSMENT — FIBROSIS 4 INDEX: FIB4 SCORE: 0.54

## 2021-04-20 NOTE — OR NURSING
Surgery and medications instructions given per anesthesia protocol. Pt to follow doctor's instructions regarding Eliquis

## 2021-04-22 ENCOUNTER — HOSPITAL ENCOUNTER (OUTPATIENT)
Facility: MEDICAL CENTER | Age: 66
End: 2021-04-22
Attending: SURGERY | Admitting: SURGERY
Payer: COMMERCIAL

## 2021-04-22 ENCOUNTER — ANESTHESIA EVENT (OUTPATIENT)
Dept: SURGERY | Facility: MEDICAL CENTER | Age: 66
End: 2021-04-22
Payer: COMMERCIAL

## 2021-04-22 ENCOUNTER — ANESTHESIA (OUTPATIENT)
Dept: SURGERY | Facility: MEDICAL CENTER | Age: 66
End: 2021-04-22
Payer: COMMERCIAL

## 2021-04-22 VITALS
RESPIRATION RATE: 16 BRPM | TEMPERATURE: 97 F | HEART RATE: 51 BPM | OXYGEN SATURATION: 90 % | HEIGHT: 72 IN | BODY MASS INDEX: 32.79 KG/M2 | DIASTOLIC BLOOD PRESSURE: 69 MMHG | WEIGHT: 242.06 LBS | SYSTOLIC BLOOD PRESSURE: 122 MMHG

## 2021-04-22 PROCEDURE — 700105 HCHG RX REV CODE 258: Performed by: SURGERY

## 2021-04-22 PROCEDURE — 160046 HCHG PACU - 1ST 60 MINS PHASE II: Performed by: SURGERY

## 2021-04-22 PROCEDURE — 160025 RECOVERY II MINUTES (STATS): Performed by: SURGERY

## 2021-04-22 PROCEDURE — 160002 HCHG RECOVERY MINUTES (STAT): Performed by: SURGERY

## 2021-04-22 PROCEDURE — 160009 HCHG ANES TIME/MIN: Performed by: SURGERY

## 2021-04-22 PROCEDURE — 160035 HCHG PACU - 1ST 60 MINS PHASE I: Performed by: SURGERY

## 2021-04-22 PROCEDURE — 700101 HCHG RX REV CODE 250: Performed by: SURGERY

## 2021-04-22 PROCEDURE — 160028 HCHG SURGERY MINUTES - 1ST 30 MINS LEVEL 3: Performed by: SURGERY

## 2021-04-22 PROCEDURE — 160048 HCHG OR STATISTICAL LEVEL 1-5: Performed by: SURGERY

## 2021-04-22 PROCEDURE — A9270 NON-COVERED ITEM OR SERVICE: HCPCS | Performed by: SURGERY

## 2021-04-22 PROCEDURE — 700111 HCHG RX REV CODE 636 W/ 250 OVERRIDE (IP): Performed by: ANESTHESIOLOGY

## 2021-04-22 PROCEDURE — 700111 HCHG RX REV CODE 636 W/ 250 OVERRIDE (IP): Performed by: SURGERY

## 2021-04-22 PROCEDURE — 501838 HCHG SUTURE GENERAL: Performed by: SURGERY

## 2021-04-22 RX ORDER — CEFAZOLIN SODIUM 1 G/3ML
INJECTION, POWDER, FOR SOLUTION INTRAMUSCULAR; INTRAVENOUS PRN
Status: DISCONTINUED | OUTPATIENT
Start: 2021-04-22 | End: 2021-04-22 | Stop reason: SURG

## 2021-04-22 RX ORDER — BUPIVACAINE HYDROCHLORIDE 2.5 MG/ML
INJECTION, SOLUTION EPIDURAL; INFILTRATION; INTRACAUDAL
Status: DISCONTINUED | OUTPATIENT
Start: 2021-04-22 | End: 2021-04-22 | Stop reason: HOSPADM

## 2021-04-22 RX ORDER — SODIUM CHLORIDE, SODIUM LACTATE, POTASSIUM CHLORIDE, CALCIUM CHLORIDE 600; 310; 30; 20 MG/100ML; MG/100ML; MG/100ML; MG/100ML
INJECTION, SOLUTION INTRAVENOUS CONTINUOUS
Status: DISCONTINUED | OUTPATIENT
Start: 2021-04-22 | End: 2021-04-22 | Stop reason: HOSPADM

## 2021-04-22 RX ORDER — HALOPERIDOL 5 MG/ML
1 INJECTION INTRAMUSCULAR
Status: DISCONTINUED | OUTPATIENT
Start: 2021-04-22 | End: 2021-04-22 | Stop reason: HOSPADM

## 2021-04-22 RX ORDER — ONDANSETRON 2 MG/ML
INJECTION INTRAMUSCULAR; INTRAVENOUS PRN
Status: DISCONTINUED | OUTPATIENT
Start: 2021-04-22 | End: 2021-04-22 | Stop reason: SURG

## 2021-04-22 RX ORDER — SODIUM CHLORIDE 9 MG/ML
INJECTION, SOLUTION INTRAVENOUS CONTINUOUS
Status: DISCONTINUED | OUTPATIENT
Start: 2021-04-22 | End: 2021-04-22 | Stop reason: HOSPADM

## 2021-04-22 RX ORDER — HYDROMORPHONE HYDROCHLORIDE 1 MG/ML
0.4 INJECTION, SOLUTION INTRAMUSCULAR; INTRAVENOUS; SUBCUTANEOUS
Status: DISCONTINUED | OUTPATIENT
Start: 2021-04-22 | End: 2021-04-22 | Stop reason: HOSPADM

## 2021-04-22 RX ORDER — ONDANSETRON 2 MG/ML
4 INJECTION INTRAMUSCULAR; INTRAVENOUS
Status: DISCONTINUED | OUTPATIENT
Start: 2021-04-22 | End: 2021-04-22 | Stop reason: HOSPADM

## 2021-04-22 RX ORDER — HYDROMORPHONE HYDROCHLORIDE 1 MG/ML
0.1 INJECTION, SOLUTION INTRAMUSCULAR; INTRAVENOUS; SUBCUTANEOUS
Status: DISCONTINUED | OUTPATIENT
Start: 2021-04-22 | End: 2021-04-22 | Stop reason: HOSPADM

## 2021-04-22 RX ORDER — PHENYLEPHRINE HYDROCHLORIDE 10 MG/ML
INJECTION, SOLUTION INTRAMUSCULAR; INTRAVENOUS; SUBCUTANEOUS PRN
Status: DISCONTINUED | OUTPATIENT
Start: 2021-04-22 | End: 2021-04-22 | Stop reason: SURG

## 2021-04-22 RX ORDER — HYDROMORPHONE HYDROCHLORIDE 1 MG/ML
0.2 INJECTION, SOLUTION INTRAMUSCULAR; INTRAVENOUS; SUBCUTANEOUS
Status: DISCONTINUED | OUTPATIENT
Start: 2021-04-22 | End: 2021-04-22 | Stop reason: HOSPADM

## 2021-04-22 RX ADMIN — POVIDONE IODINE 15 ML: 100 SOLUTION TOPICAL at 13:03

## 2021-04-22 RX ADMIN — ONDANSETRON 4 MG: 2 INJECTION INTRAMUSCULAR; INTRAVENOUS at 14:17

## 2021-04-22 RX ADMIN — PHENYLEPHRINE HYDROCHLORIDE 100 MCG: 10 INJECTION INTRAVENOUS at 14:12

## 2021-04-22 RX ADMIN — SODIUM CHLORIDE, POTASSIUM CHLORIDE, SODIUM LACTATE AND CALCIUM CHLORIDE: 600; 310; 30; 20 INJECTION, SOLUTION INTRAVENOUS at 13:03

## 2021-04-22 RX ADMIN — CEFAZOLIN 2 G: 330 INJECTION, POWDER, FOR SOLUTION INTRAMUSCULAR; INTRAVENOUS at 14:04

## 2021-04-22 RX ADMIN — FENTANYL CITRATE 100 MCG: 50 INJECTION, SOLUTION INTRAMUSCULAR; INTRAVENOUS at 14:07

## 2021-04-22 RX ADMIN — PROPOFOL 200 MG: 10 INJECTION, EMULSION INTRAVENOUS at 14:07

## 2021-04-22 ASSESSMENT — FIBROSIS 4 INDEX: FIB4 SCORE: 0.65

## 2021-04-22 ASSESSMENT — PAIN DESCRIPTION - PAIN TYPE: TYPE: SURGICAL PAIN

## 2021-04-22 NOTE — OP REPORT
DATE OF SERVICE:  04/22/2021     PREOPERATIVE DIAGNOSIS:  History of lymphoma.     POSTOPERATIVE DIAGNOSIS:  History of lymphoma.     PROCEDURE:  Port-A-Cath removal.     SURGEON:  Montse Moura MD     ASSISTANT:  REGI Matthew     ANESTHESIA:  Laryngeal mask.     ANESTHESIOLOGIST:  Fiordaliza Clark MD     INDICATIONS:  The patient is a 66-year-old male who presented in June of last   year with a stomach mass that was actively bleeding.  He ultimately had to   have a partial gastrectomy and splenectomy as well as partial removal of the   diaphragm.  He has now completed his chemotherapy.  He is being brought at   this time for port removal.     FINDINGS:  Port was removed in its entirety.     DESCRIPTION OF PROCEDURE:  After the patient was identified and consented, he   was brought to the operating room and placed in supine position.  The patient   underwent laryngeal mask anesthetic clearance.  The patient's chest was   prepped and draped in sterile fashion.  The area around the port was   anesthetized with 0.25% Marcaine.  The port site was reopened and using   electrocautery subcutaneous tissue was dissected down.  The port was grasped   and removed.  Pressure was placed in the infraclavicular area for   approximately 3-4 minutes.  Once that was completed, the port site was closed   with 3-0 Vicryl subcutaneous layer and skin was closed in reinforced fashion.    Op-Site was placed in the wound.  The patient was extubated and taken to   recovery room in stable condition.  All sponge and needle counts were correct.        ______________________________  MD TAZ RICE/FERNANDO/ALDA    DD:  04/22/2021 14:17  DT:  04/22/2021 16:28    Job#:  [id number]    CC:MD Fiordaliza BELL MD

## 2021-04-22 NOTE — OR NURSING
D/c orders received. IV dc'd. Pt changed into clothing with assistance. Discharge instructions given as well as pain management handout; pt and family verbalized understanding and questions answered. Patient states ready to d/c home. No prescriptions given. Pt dc'd in w/c with CNA in stable condition.

## 2021-04-22 NOTE — ANESTHESIA PROCEDURE NOTES
Airway    Date/Time: 4/22/2021 2:08 PM  Performed by: Fiordaliza Clark M.D.  Authorized by: Fiordaliza Clark M.D.     Location:  OR  Urgency:  Elective  Indications for Airway Management:  Anesthesia      Spontaneous Ventilation: absent    Sedation Level:  Deep  Preoxygenated: Yes    Mask Difficulty Assessment:  0 - not attempted  Final Airway Type:  Supraglottic airway  Final Supraglottic Airway:  Standard LMA    SGA Size:  5  Number of Attempts at Approach:  1

## 2021-04-22 NOTE — DISCHARGE INSTRUCTIONS
ACTIVITY: Rest and take it easy for the first 24 hours.  A responsible adult is recommended to remain with you during that time.  It is normal to feel sleepy.  We encourage you to not do anything that requires balance, judgment or coordination.    MILD FLU-LIKE SYMPTOMS ARE NORMAL. YOU MAY EXPERIENCE GENERALIZED MUSCLE ACHES, THROAT IRRITATION, HEADACHE AND/OR SOME NAUSEA.    FOR 24 HOURS DO NOT:  Drive, operate machinery or run household appliances.  Drink beer or alcoholic beverages.   Make important decisions or sign legal documents.    SPECIAL INSTRUCTIONS: Follow MD instructions    DIET: To avoid nausea, slowly advance diet as tolerated, avoiding spicy or greasy foods for the first day.  Add more substantial food to your diet according to your physician's instructions.  INCREASE FLUIDS AND FIBER TO AVOID CONSTIPATION.    SURGICAL DRESSING/BATHING: May remove dressings 4/24    FOLLOW-UP APPOINTMENT:  A follow-up appointment should be arranged with your doctor in 1-2 Weeks; call to schedule.    You should CALL YOUR PHYSICIAN if you develop:  Fever greater than 101 degrees F.  Pain not relieved by medication, or persistent nausea or vomiting.  Excessive bleeding (blood soaking through dressing) or unexpected drainage from the wound.  Extreme redness or swelling around the incision site, drainage of pus or foul smelling drainage.  Inability to urinate or empty your bladder within 8 hours.  Problems with breathing or chest pain.    You should call 911 if you develop problems with breathing or chest pain.  If you are unable to contact your doctor or surgical center, you should go to the nearest emergency room or urgent care center.  Physician's telephone #: [phone]    If any questions arise, call your doctor.  If your doctor is not available, please feel free to call the Surgical Center at [phone]. The Contact Center is open Monday through Friday 7AM to 5PM and may speak to a nurse at [phone], or  toll free at [phone].     A registered nurse may call you a few days after your surgery to see how you are doing after your procedure.    MEDICATIONS: Resume taking daily medication.  Take prescribed pain medication with food.  If no medication is prescribed, you may take non-aspirin pain medication if needed.  PAIN MEDICATION CAN BE VERY CONSTIPATING.  Take a stool softener or laxative such as senokot, pericolace, or milk of magnesia if needed.    No Prescriptions given .  No oral  pain medication given in recovery.    If your physician has prescribed pain medication that includes Acetaminophen (Tylenol), do not take additional Acetaminophen (Tylenol) while taking the prescribed medication.    Depression / Suicide Risk    As you are discharged from this [facility] facility, it is important to learn how to keep safe from harming yourself.    Recognize the warning signs:  · Abrupt changes in personality, positive or negative- including increase in energy   · Giving away possessions  · Change in eating patterns- significant weight changes-  positive or negative  · Change in sleeping patterns- unable to sleep or sleeping all the time   · Unwillingness or inability to communicate  · Depression  · Unusual sadness, discouragement and loneliness  · Talk of wanting to die  · Neglect of personal appearance   · Rebelliousness- reckless behavior  · Withdrawal from people/activities they love  · Confusion- inability to concentrate     If you or a loved one observes any of these behaviors or has concerns about self-harm, here's what you can do:  · Talk about it- your feelings and reasons for harming yourself  · Remove any means that you might use to hurt yourself (examples: pills, rope, extension cords, firearm)  · Get professional help from the community (Mental Health, Substance Abuse, psychological counseling)  · Do not be alone:Call your Safe Contact- someone whom you trust who will be there for you.  · Call your  local CRISIS HOTLINE 916-8847 or 845-646-1060  · Call your local Children's Mobile Crisis Response Team [place] (550) 346-6160 or www.[employer]  · Call the toll free National Suicide Prevention Hotlines   · National Suicide Prevention Lifeline 672-361-HKGU (0172)  · National [employer] Line Network 800-SUICIDE (749-2194)

## 2021-04-22 NOTE — OR NURSING
1500-pt awake and alert, denies pain, using IS for diminished breath sounds on the left, able to wean off O2, bg po fluids well

## 2021-04-22 NOTE — ANESTHESIA PREPROCEDURE EVALUATION
65 y/o male with h/o high grade lymphoma now s/p treatment, getting port out. No problems with anesthesia in the past. Also with HTN.    Relevant Problems   No relevant active problems       Physical Exam    Airway   Mallampati: II  TM distance: >3 FB  Neck ROM: full       Cardiovascular - normal exam  Rhythm: regular  Rate: normal  (-) murmur     Dental - normal exam           Pulmonary - normal exam  Breath sounds clear to auscultation     Abdominal    Neurological - normal exam                 Anesthesia Plan    ASA 2       Plan - general       Airway plan will be LMA          Induction: intravenous      Pertinent diagnostic labs and testing reviewed    Informed Consent:    Anesthetic plan and risks discussed with patient.

## 2021-04-22 NOTE — ANESTHESIA TIME REPORT
Anesthesia Start and Stop Event Times     Date Time Event    4/22/2021 1235 Ready for Procedure     1403 Anesthesia Start     1427 Anesthesia Stop        Responsible Staff  04/22/21    Name Role Begin End    Fiordaliza Clark M.D. Anesth 1403 1427        Preop Diagnosis (Free Text):  Pre-op Diagnosis     LONG TERM MEDICATIONS, HIGH-RISK MEDICATIONS        Preop Diagnosis (Codes):    Post op Diagnosis  Lymphoma (HCC)      Premium Reason  Non-Premium    Comments:

## 2021-04-23 NOTE — ANESTHESIA POSTPROCEDURE EVALUATION
Patient: Napoleon Holbrook Zendejas III    Procedure Summary     Date: 04/22/21 Room / Location: [facility] OR 08 / SURGERY [facility]    Anesthesia Start: 1403 Anesthesia Stop: 1427    Procedure: REMOVAL, CATHETER - PORT. (Chest) Diagnosis: (LONG TERM MEDICATIONS, HIGH-RISK MEDICATIONS)    Surgeons: Montse Moura M.D. Responsible Provider: Fiordaliza Clark M.D.    Anesthesia Type: general ASA Status: 2          Final Anesthesia Type: general  Last vitals  BP   Blood Pressure : 122/69    Temp   36.1 °C (97 °F)    Pulse   (!) 51   Resp   16    SpO2   90 %      Anesthesia Post Evaluation    Patient location during evaluation: PACU  Patient participation: complete - patient participated  Level of consciousness: awake and alert    Airway patency: patent  Anesthetic complications: no  Cardiovascular status: hemodynamically stable  Respiratory status: acceptable  Hydration status: euvolemic    PONV: none          No complications documented.     Nurse Pain Score: 0 (NPRS)

## 2021-08-11 ENCOUNTER — HOSPITAL ENCOUNTER (OUTPATIENT)
Dept: LAB | Facility: MEDICAL CENTER | Age: 66
End: 2021-08-11
Attending: INTERNAL MEDICINE
Payer: COMMERCIAL

## 2021-08-11 LAB
ALBUMIN SERPL BCP-MCNC: 4.5 G/DL (ref 3.2–4.9)
ALBUMIN/GLOB SERPL: 1.6 G/DL
ALP SERPL-CCNC: 81 U/L (ref 30–99)
ALT SERPL-CCNC: 24 U/L (ref 2–50)
ANION GAP SERPL CALC-SCNC: 13 MMOL/L (ref 7–16)
AST SERPL-CCNC: 23 U/L (ref 12–45)
BASOPHILS # BLD AUTO: 1.4 % (ref 0–1.8)
BASOPHILS # BLD: 0.1 K/UL (ref 0–0.12)
BILIRUB SERPL-MCNC: 0.4 MG/DL (ref 0.1–1.5)
BUN SERPL-MCNC: 20 MG/DL (ref 8–22)
CALCIUM SERPL-MCNC: 9.7 MG/DL (ref 8.5–10.5)
CHLORIDE SERPL-SCNC: 102 MMOL/L (ref 96–112)
CO2 SERPL-SCNC: 24 MMOL/L (ref 20–33)
CREAT SERPL-MCNC: 0.89 MG/DL (ref 0.5–1.4)
EOSINOPHIL # BLD AUTO: 0.28 K/UL (ref 0–0.51)
EOSINOPHIL NFR BLD: 3.9 % (ref 0–6.9)
ERYTHROCYTE [DISTWIDTH] IN BLOOD BY AUTOMATED COUNT: 47.8 FL (ref 35.9–50)
GLOBULIN SER CALC-MCNC: 2.9 G/DL (ref 1.9–3.5)
GLUCOSE SERPL-MCNC: 94 MG/DL (ref 65–99)
HCT VFR BLD AUTO: 50.1 % (ref 42–52)
HGB BLD-MCNC: 16.4 G/DL (ref 14–18)
IMM GRANULOCYTES # BLD AUTO: 0.04 K/UL (ref 0–0.11)
IMM GRANULOCYTES NFR BLD AUTO: 0.6 % (ref 0–0.9)
LDH SERPL L TO P-CCNC: 190 U/L (ref 107–266)
LYMPHOCYTES # BLD AUTO: 2.04 K/UL (ref 1–4.8)
LYMPHOCYTES NFR BLD: 28.3 % (ref 22–41)
MCH RBC QN AUTO: 30.8 PG (ref 27–33)
MCHC RBC AUTO-ENTMCNC: 32.7 G/DL (ref 33.7–35.3)
MCV RBC AUTO: 94 FL (ref 81.4–97.8)
MONOCYTES # BLD AUTO: 1.01 K/UL (ref 0–0.85)
MONOCYTES NFR BLD AUTO: 14 % (ref 0–13.4)
NEUTROPHILS # BLD AUTO: 3.74 K/UL (ref 1.82–7.42)
NEUTROPHILS NFR BLD: 51.8 % (ref 44–72)
NRBC # BLD AUTO: 0 K/UL
NRBC BLD-RTO: 0 /100 WBC
PLATELET # BLD AUTO: 471 K/UL (ref 164–446)
PMV BLD AUTO: 9.8 FL (ref 9–12.9)
POTASSIUM SERPL-SCNC: 4 MMOL/L (ref 3.6–5.5)
PROT SERPL-MCNC: 7.4 G/DL (ref 6–8.2)
RBC # BLD AUTO: 5.33 M/UL (ref 4.7–6.1)
SODIUM SERPL-SCNC: 139 MMOL/L (ref 135–145)
WBC # BLD AUTO: 7.2 K/UL (ref 4.8–10.8)

## 2021-08-11 PROCEDURE — 80053 COMPREHEN METABOLIC PANEL: CPT

## 2021-08-11 PROCEDURE — 36415 COLL VENOUS BLD VENIPUNCTURE: CPT

## 2021-08-11 PROCEDURE — 85025 COMPLETE CBC W/AUTO DIFF WBC: CPT

## 2021-08-11 PROCEDURE — 83615 LACTATE (LD) (LDH) ENZYME: CPT

## 2023-01-01 NOTE — CARE PLAN
Problem: Fluid Volume:  Goal: Will maintain balanced intake and output  Outcome: PROGRESSING AS EXPECTED     Problem: Communication  Goal: The ability to communicate needs accurately and effectively will improve  Outcome: PROGRESSING AS EXPECTED     Problem: Safety  Goal: Will remain free from injury  Outcome: PROGRESSING AS EXPECTED     Problem: Infection  Goal: Will remain free from infection  Outcome: PROGRESSING AS EXPECTED     Problem: Pain Management  Goal: Pain level will decrease to patient's comfort goal  Outcome: PROGRESSING AS EXPECTED      Progress Note - PICU   Milo Carrasquillo 6 wk. o. male MRN: [id number]  Unit/Bed#: PICU 332-01 Encounter: [id number]      Subjective/Objective       HPI/24hr events: No acute events. Patient has not had a prolonged apneic spell for 72 hours. Patient looks better per father. Father states that the rashes on the head and the scrotum are getting better. Vitals:    10/09/23 0400 10/09/23 0500 10/09/23 0600 10/09/23 0800   BP: (!) 83/35 (!) 80/34 (!) 64/33 (!) 92/40   BP Location: Left leg   Left leg   Pulse: 135 142 129 154   Resp: 40  36 31   Temp: 98 °F (36.7 °C)   98.3 °F (36.8 °C)   TempSrc: Axillary   Axillary   SpO2: 99% 100% 100% 100%   Weight:   4910 g (10 lb 13.2 oz)    Height:       HC:                   Temperature:   Temp (24hrs), Av.1 °F (36.7 °C), Min:97.8 °F (36.6 °C), Max:98.3 °F (36.8 °C)    Current: Temperature: 98.3 °F (36.8 °C)    Weights:   IBW (Ideal Body Weight): -38.55 kg    Body mass index is 16.46 kg/m². Weight (last 2 days)     Date/Time Weight    10/09/23 06 4910 (10.82)    10/07/23 2000 4900 (10.8)    Comment rows:    OBSERV: held by mom at 10/07/23 2000    10/07/23 1900 --    Comment rows:    OBSERV: resting in bed at 10/07/23 1900            Physical Exam:    Physical Exam  Vitals and nursing note reviewed. Constitutional:       General: He is active. HENT:      Head: Normocephalic and atraumatic. Anterior fontanelle is flat. Nose: Nose normal. No congestion or rhinorrhea. Mouth/Throat:      Mouth: Mucous membranes are moist.   Cardiovascular:      Rate and Rhythm: Regular rhythm. Tachycardia present. Pulses: Normal pulses. Heart sounds: Normal heart sounds. Pulmonary:      Effort: Pulmonary effort is normal.      Breath sounds: Normal breath sounds. Comments: Strong cry  Abdominal:      General: Abdomen is flat. Musculoskeletal:         General: Normal range of motion. Skin:     General: Skin is warm.       Capillary Refill: Capillary refill takes less than 2 seconds. Comments: Erythematous patches over the scalp where prior EEG electrodes were placed well-healing. Faded erythematous nonblanchable dots on the superior scrotum near the inguinal fold   Neurological:      General: No focal deficit present. Mental Status: He is alert. Allergies: No Known Allergies    Medications:   Scheduled Meds:  Current Facility-Administered Medications   Medication Dose Route Frequency Provider Last Rate   • acetaminophen  15 mg/kg Oral Q6H PRN Ronda Del Castillo MD     • cefTRIAXone  50 mg/kg Intravenous Q24H Ronda Del Castillo MD Stopped (10/08/23 1831)   • glycerin (pediatric)  0.5 suppository Rectal Daily PRN María Gonzalez MD     • sodium chloride  1 spray Each Nare Q1H PRN Guadalupe Cota MD     • sodium chloride  3 mL/hr Intravenous Continuous Ronda Del Castillo MD 3 mL/hr (10/07/23 1606)     Continuous Infusions:sodium chloride, 3 mL/hr, Last Rate: 3 mL/hr (10/07/23 1606)      PRN Meds:  acetaminophen, 15 mg/kg, Q6H PRN  glycerin (pediatric), 0.5 suppository, Daily PRN  sodium chloride, 1 spray, Q1H PRN          Invasive lines and devices: Invasive Devices     Peripheral Intravenous Line  Duration           Peripheral IV (Ped) 10/07/23 Distal;Left;Ventral (anterior) Upper arm 1 day                  Non-Invasive/Invasive Ventilation Settings:  Respiratory    Lab Data (Last 4 hours)    None         O2/Vent Data (Last 4 hours)    None                SpO2: SpO2: 100 %      Intake and Outputs:  I/O       10/07 0701  10/08 0700 10/08 0701  10/09 0700 10/09 0701  10/10 0700    P. O. 415 520     I.V. (mL/kg) 41.7 (8.51) 75 (15.27) 3 (0.61)    IV Piggyback  5.95     Total Intake(mL/kg) 456.7 (93.2) 600.95 (122.39) 3 (0.61)    Urine (mL/kg/hr) 219 (1.86) 69 (0.59)     Other 186 642     Stool       Total Output 405 711     Net +51.7 -110.05 +3               UOP: /hour         Labs:  Results from last 7 days   Lab Units 10/05/23  0837   WBC Thousand/uL 8.06   HEMOGLOBIN g/dL 12.4   HEMATOCRIT % 36.8   PLATELETS Thousands/uL 268   MONO PCT % 2*   EOS PCT % 5      Results from last 7 days   Lab Units 10/06/23  1026 10/05/23  0837   SODIUM mmol/L 136 134*   POTASSIUM mmol/L 5.0 7.3*   CHLORIDE mmol/L 104 103   CO2 mmol/L 26* 24   BUN mg/dL 6 8   CREATININE mg/dL 0.21 <0.20   CALCIUM mg/dL 10.0 10.5   ALK PHOS U/L  --  294   ALT U/L  --  24   AST U/L  --  45                      No results found for: "PHART", "LAB3QXS", "PO2ART", "VRM9RNU", "E3VTMCYA", "BEART", "SOURCE"    Micro:  Lab Results   Component Value Date    BLOODCX No Growth at 72 hrs. 2023    BLOODCX Streptococcus salivarius (A) 2023    BLOODCX No Growth After 5 Days. 2023    URINECX No Growth <1000 cfu/mL 2023    URINECX <10,000 cfu/ml Staphylococcus aureus (A) 2023         Imaging:  MRI results, arachnoid cyst without significant mass effect I have personally reviewed pertinent reports. Assessment: 11week-old male, three 8-week gestational twin, admission 2 half weeks ago for sepsis/bacterial conjunctivitis/apneic episodes. Patient sent home with multiple days of antibiotics for total of 7 days. Patient now presents to the PICU after hypoxic respiratory failure secondary to rhinovirus and bacteremia. Patient has not had any apneic episodes for 72 hours. MRI without any significant findings to explain apneic episodes for most likely secondary to the bacteremia in the setting of immature neurologic system. Patient will need a full 10-day course of IV ceftriaxone. However has been stable for the last 72 hours and can be safely stepdown to the pediatric floor. Plan:     Neuro:   - ongoing monitoring  -MRI unremarkable.   - appreciate pediatric neurology input     CV:   - continuous monitoring  - currently with stable peripheral IV access, PICC line was discussed with parents however at this time we will continue with PIV access unless there are issues with that on inpatient and he can be given him PICC line.     Resp:   - continuous SpO2 monitoring on room air     FEN/GI:   - PO ad gay breast feeding and similac supplementation     :  - strict I's/O's     ID:   - Ceftriaxone 50mg/kg IV daily, day 4 of planned 10 day course   - 10/4 blood culture +strep salivarius, sensitive to ceftriaxone (intermediate for penicillin)  - 10/5 blood culture (NG at 72 hours. )  - CSF, urine cultures no growth on final read  - appreciate ID recommendations     Heme: No acute issues.     Endo: No acute issues.                  Msk/Skin: No acute issues.     Disposition: Transfer to  Pediatrics      Code Status: Level 1 - Full Code        Mel Martinez MD

## 2023-03-30 ENCOUNTER — HOSPITAL ENCOUNTER (OUTPATIENT)
Facility: MEDICAL CENTER | Age: 68
End: 2023-03-30
Attending: INTERNAL MEDICINE
Payer: COMMERCIAL

## 2023-03-30 LAB
ALBUMIN SERPL BCP-MCNC: 4.5 G/DL (ref 3.2–4.9)
ALBUMIN/GLOB SERPL: 1.5 G/DL
ALP SERPL-CCNC: 83 U/L (ref 30–99)
ALT SERPL-CCNC: 23 U/L (ref 2–50)
ANION GAP SERPL CALC-SCNC: 13 MMOL/L (ref 7–16)
AST SERPL-CCNC: 23 U/L (ref 12–45)
BILIRUB SERPL-MCNC: 0.6 MG/DL (ref 0.1–1.5)
BUN SERPL-MCNC: 21 MG/DL (ref 8–22)
CALCIUM ALBUM COR SERPL-MCNC: 9.1 MG/DL (ref 8.5–10.5)
CALCIUM SERPL-MCNC: 9.5 MG/DL (ref 8.5–10.5)
CHLORIDE SERPL-SCNC: 99 MMOL/L (ref 96–112)
CO2 SERPL-SCNC: 24 MMOL/L (ref 20–33)
CREAT SERPL-MCNC: 0.92 MG/DL (ref 0.5–1.4)
GFR SERPLBLD CREATININE-BSD FMLA CKD-EPI: 91 ML/MIN/1.73 M 2
GLOBULIN SER CALC-MCNC: 3.1 G/DL (ref 1.9–3.5)
GLUCOSE SERPL-MCNC: 113 MG/DL (ref 65–99)
LDH SERPL L TO P-CCNC: 158 U/L (ref 107–266)
POTASSIUM SERPL-SCNC: 4.2 MMOL/L (ref 3.6–5.5)
PROT SERPL-MCNC: 7.6 G/DL (ref 6–8.2)
SODIUM SERPL-SCNC: 136 MMOL/L (ref 135–145)

## 2023-03-30 PROCEDURE — 80053 COMPREHEN METABOLIC PANEL: CPT

## 2023-03-30 PROCEDURE — 83615 LACTATE (LD) (LDH) ENZYME: CPT

## 2023-05-08 NOTE — PROGRESS NOTES
Bedside report received.  Assessment complete.  A&O x 4. Patient calls appropriately.  Patient mobilizes independently.  Patient has 2/10 pain. PCA in use.  Denies N&V. Tolerating regular diet.  Surgical site to left flank and left back, old SMOOTH site on left lower abdomen.  + void, + flatus, + BM.  Patient denies SOB.  SCD's off.  Review plan with of care with patient. Call light and personal belongings with in reach. Hourly rounding in place. All needs met at this time.   Double O-Z Plasty Text: The defect edges were debeveled with a #15 scalpel blade. Given the location of the defect, shape of the defect and the proximity to free margins a Double O-Z plasty (double transposition flap) was deemed most appropriate. Using a sterile surgical marker, the appropriate transposition flaps were drawn incorporating the defect and placing the expected incisions within the relaxed skin tension lines where possible. The area thus outlined was incised deep to adipose tissue with a #15 scalpel blade. The skin margins were undermined to an appropriate distance in all directions utilizing iris scissors. Hemostasis was achieved with electrocautery. The flaps were then transposed and carried over into place, one clockwise and the other counterclockwise, and anchored with interrupted buried subcutaneous sutures.

## 2023-09-30 ENCOUNTER — HOSPITAL ENCOUNTER (OUTPATIENT)
Dept: LAB | Facility: MEDICAL CENTER | Age: 68
End: 2023-09-30
Attending: STUDENT IN AN ORGANIZED HEALTH CARE EDUCATION/TRAINING PROGRAM
Payer: COMMERCIAL

## 2023-09-30 LAB
25(OH)D3 SERPL-MCNC: 26 NG/ML (ref 30–100)
ALBUMIN SERPL BCP-MCNC: 4.5 G/DL (ref 3.2–4.9)
ALBUMIN/GLOB SERPL: 1.4 G/DL
ALP SERPL-CCNC: 77 U/L (ref 30–99)
ALT SERPL-CCNC: 19 U/L (ref 2–50)
ANION GAP SERPL CALC-SCNC: 13 MMOL/L (ref 7–16)
AST SERPL-CCNC: 21 U/L (ref 12–45)
BASOPHILS # BLD AUTO: 1.4 % (ref 0–1.8)
BASOPHILS # BLD: 0.11 K/UL (ref 0–0.12)
BILIRUB SERPL-MCNC: 0.6 MG/DL (ref 0.1–1.5)
BUN SERPL-MCNC: 15 MG/DL (ref 8–22)
CALCIUM ALBUM COR SERPL-MCNC: 9.2 MG/DL (ref 8.5–10.5)
CALCIUM SERPL-MCNC: 9.6 MG/DL (ref 8.5–10.5)
CHLORIDE SERPL-SCNC: 98 MMOL/L (ref 96–112)
CHOLEST SERPL-MCNC: 192 MG/DL (ref 100–199)
CO2 SERPL-SCNC: 27 MMOL/L (ref 20–33)
CREAT SERPL-MCNC: 1.04 MG/DL (ref 0.5–1.4)
EOSINOPHIL # BLD AUTO: 0.26 K/UL (ref 0–0.51)
EOSINOPHIL NFR BLD: 3.2 % (ref 0–6.9)
ERYTHROCYTE [DISTWIDTH] IN BLOOD BY AUTOMATED COUNT: 47.2 FL (ref 35.9–50)
FASTING STATUS PATIENT QL REPORTED: NORMAL
GFR SERPLBLD CREATININE-BSD FMLA CKD-EPI: 78 ML/MIN/1.73 M 2
GLOBULIN SER CALC-MCNC: 3.2 G/DL (ref 1.9–3.5)
GLUCOSE SERPL-MCNC: 93 MG/DL (ref 65–99)
HCT VFR BLD AUTO: 50.9 % (ref 42–52)
HDLC SERPL-MCNC: 37 MG/DL
HGB BLD-MCNC: 16.9 G/DL (ref 14–18)
IMM GRANULOCYTES # BLD AUTO: 0.03 K/UL (ref 0–0.11)
IMM GRANULOCYTES NFR BLD AUTO: 0.4 % (ref 0–0.9)
LDLC SERPL CALC-MCNC: 109 MG/DL
LYMPHOCYTES # BLD AUTO: 2.46 K/UL (ref 1–4.8)
LYMPHOCYTES NFR BLD: 30.2 % (ref 22–41)
MCH RBC QN AUTO: 30.9 PG (ref 27–33)
MCHC RBC AUTO-ENTMCNC: 33.2 G/DL (ref 32.3–36.5)
MCV RBC AUTO: 93.1 FL (ref 81.4–97.8)
MONOCYTES # BLD AUTO: 1.1 K/UL (ref 0–0.85)
MONOCYTES NFR BLD AUTO: 13.5 % (ref 0–13.4)
NEUTROPHILS # BLD AUTO: 4.18 K/UL (ref 1.82–7.42)
NEUTROPHILS NFR BLD: 51.3 % (ref 44–72)
NRBC # BLD AUTO: 0 K/UL
NRBC BLD-RTO: 0 /100 WBC (ref 0–0.2)
PLATELET # BLD AUTO: 513 K/UL (ref 164–446)
PMV BLD AUTO: 9.8 FL (ref 9–12.9)
POTASSIUM SERPL-SCNC: 4.6 MMOL/L (ref 3.6–5.5)
PROT SERPL-MCNC: 7.7 G/DL (ref 6–8.2)
PSA SERPL-MCNC: 0.5 NG/ML (ref 0–4)
RBC # BLD AUTO: 5.47 M/UL (ref 4.7–6.1)
SODIUM SERPL-SCNC: 138 MMOL/L (ref 135–145)
TRIGL SERPL-MCNC: 228 MG/DL (ref 0–149)
WBC # BLD AUTO: 8.1 K/UL (ref 4.8–10.8)

## 2023-09-30 PROCEDURE — 36415 COLL VENOUS BLD VENIPUNCTURE: CPT

## 2023-09-30 PROCEDURE — 84153 ASSAY OF PSA TOTAL: CPT

## 2023-09-30 PROCEDURE — 82306 VITAMIN D 25 HYDROXY: CPT

## 2023-09-30 PROCEDURE — 85025 COMPLETE CBC W/AUTO DIFF WBC: CPT

## 2023-09-30 PROCEDURE — 80053 COMPREHEN METABOLIC PANEL: CPT

## 2023-09-30 PROCEDURE — 80061 LIPID PANEL: CPT

## 2023-12-27 NOTE — PROGRESS NOTES
Report received from ICU.  Patient arrived via bed.  Assessment complete.  A&O x 4. Patient calls appropriately.  Patient mobilizes with max assist. Bed alarm n/a, patient calls appropriately.   Patient has 3/10 pain. PCA in use, bolus button in patients reach.  Denies N&V. NPO.  Surgical site to midline with prevena, LLQ SMOOTH drain with dressing in place, Chest tube to left flank with dressing in place to -20mmHg suction.  + void, + flatus, + BM.  Patient denies SOB.  SCD's on.  Review plan with of care with patient. Call light and personal belongings with in reach. Hourly rounding in place. All needs met at this time.   Attending with

## 2024-03-30 ENCOUNTER — HOSPITAL ENCOUNTER (OUTPATIENT)
Dept: LAB | Facility: MEDICAL CENTER | Age: 69
End: 2024-03-30
Attending: STUDENT IN AN ORGANIZED HEALTH CARE EDUCATION/TRAINING PROGRAM
Payer: MEDICARE

## 2024-03-30 LAB
25(OH)D3 SERPL-MCNC: 39 NG/ML (ref 30–100)
ALBUMIN SERPL BCP-MCNC: 4.6 G/DL (ref 3.2–4.9)
ALBUMIN/GLOB SERPL: 1.5 G/DL
ALP SERPL-CCNC: 72 U/L (ref 30–99)
ALT SERPL-CCNC: 20 U/L (ref 2–50)
ANION GAP SERPL CALC-SCNC: 14 MMOL/L (ref 7–16)
AST SERPL-CCNC: 21 U/L (ref 12–45)
BASOPHILS # BLD AUTO: 1.5 % (ref 0–1.8)
BASOPHILS # BLD: 0.11 K/UL (ref 0–0.12)
BILIRUB SERPL-MCNC: 0.5 MG/DL (ref 0.1–1.5)
BUN SERPL-MCNC: 17 MG/DL (ref 8–22)
CALCIUM ALBUM COR SERPL-MCNC: 9.2 MG/DL (ref 8.5–10.5)
CALCIUM SERPL-MCNC: 9.7 MG/DL (ref 8.5–10.5)
CHLORIDE SERPL-SCNC: 99 MMOL/L (ref 96–112)
CHOLEST SERPL-MCNC: 183 MG/DL (ref 100–199)
CO2 SERPL-SCNC: 23 MMOL/L (ref 20–33)
CREAT SERPL-MCNC: 0.73 MG/DL (ref 0.5–1.4)
EOSINOPHIL # BLD AUTO: 0.18 K/UL (ref 0–0.51)
EOSINOPHIL NFR BLD: 2.5 % (ref 0–6.9)
ERYTHROCYTE [DISTWIDTH] IN BLOOD BY AUTOMATED COUNT: 47.3 FL (ref 35.9–50)
GFR SERPLBLD CREATININE-BSD FMLA CKD-EPI: 99 ML/MIN/1.73 M 2
GLOBULIN SER CALC-MCNC: 3 G/DL (ref 1.9–3.5)
GLUCOSE SERPL-MCNC: 104 MG/DL (ref 65–99)
HCT VFR BLD AUTO: 50.3 % (ref 42–52)
HDLC SERPL-MCNC: 36 MG/DL
HGB BLD-MCNC: 16.9 G/DL (ref 14–18)
IMM GRANULOCYTES # BLD AUTO: 0.03 K/UL (ref 0–0.11)
IMM GRANULOCYTES NFR BLD AUTO: 0.4 % (ref 0–0.9)
LDLC SERPL CALC-MCNC: 106 MG/DL
LYMPHOCYTES # BLD AUTO: 2.25 K/UL (ref 1–4.8)
LYMPHOCYTES NFR BLD: 31.5 % (ref 22–41)
MCH RBC QN AUTO: 30.7 PG (ref 27–33)
MCHC RBC AUTO-ENTMCNC: 33.6 G/DL (ref 32.3–36.5)
MCV RBC AUTO: 91.5 FL (ref 81.4–97.8)
MONOCYTES # BLD AUTO: 0.98 K/UL (ref 0–0.85)
MONOCYTES NFR BLD AUTO: 13.7 % (ref 0–13.4)
NEUTROPHILS # BLD AUTO: 3.59 K/UL (ref 1.82–7.42)
NEUTROPHILS NFR BLD: 50.4 % (ref 44–72)
NRBC # BLD AUTO: 0 K/UL
NRBC BLD-RTO: 0 /100 WBC (ref 0–0.2)
PLATELET # BLD AUTO: 515 K/UL (ref 164–446)
PMV BLD AUTO: 9.9 FL (ref 9–12.9)
POTASSIUM SERPL-SCNC: 4.1 MMOL/L (ref 3.6–5.5)
PROT SERPL-MCNC: 7.6 G/DL (ref 6–8.2)
RBC # BLD AUTO: 5.5 M/UL (ref 4.7–6.1)
SODIUM SERPL-SCNC: 136 MMOL/L (ref 135–145)
TRIGL SERPL-MCNC: 207 MG/DL (ref 0–149)
WBC # BLD AUTO: 7.1 K/UL (ref 4.8–10.8)

## 2024-03-30 PROCEDURE — 80053 COMPREHEN METABOLIC PANEL: CPT

## 2024-03-30 PROCEDURE — 80061 LIPID PANEL: CPT

## 2024-03-30 PROCEDURE — 36415 COLL VENOUS BLD VENIPUNCTURE: CPT

## 2024-03-30 PROCEDURE — 85025 COMPLETE CBC W/AUTO DIFF WBC: CPT

## 2024-03-30 PROCEDURE — 82306 VITAMIN D 25 HYDROXY: CPT

## 2024-09-24 ENCOUNTER — HOSPITAL ENCOUNTER (OUTPATIENT)
Dept: LAB | Facility: MEDICAL CENTER | Age: 69
End: 2024-09-24
Attending: STUDENT IN AN ORGANIZED HEALTH CARE EDUCATION/TRAINING PROGRAM
Payer: MEDICARE

## 2024-09-24 LAB
BASOPHILS # BLD AUTO: 1.6 % (ref 0–1.8)
BASOPHILS # BLD: 0.14 K/UL (ref 0–0.12)
EOSINOPHIL # BLD AUTO: 0.25 K/UL (ref 0–0.51)
EOSINOPHIL NFR BLD: 2.8 % (ref 0–6.9)
ERYTHROCYTE [DISTWIDTH] IN BLOOD BY AUTOMATED COUNT: 46.9 FL (ref 35.9–50)
HCT VFR BLD AUTO: 51.7 % (ref 42–52)
HGB BLD-MCNC: 17.7 G/DL (ref 14–18)
IMM GRANULOCYTES # BLD AUTO: 0.08 K/UL (ref 0–0.11)
IMM GRANULOCYTES NFR BLD AUTO: 0.9 % (ref 0–0.9)
LYMPHOCYTES # BLD AUTO: 2.79 K/UL (ref 1–4.8)
LYMPHOCYTES NFR BLD: 31 % (ref 22–41)
MCH RBC QN AUTO: 31.9 PG (ref 27–33)
MCHC RBC AUTO-ENTMCNC: 34.2 G/DL (ref 32.3–36.5)
MCV RBC AUTO: 93.3 FL (ref 81.4–97.8)
MONOCYTES # BLD AUTO: 1.17 K/UL (ref 0–0.85)
MONOCYTES NFR BLD AUTO: 13 % (ref 0–13.4)
NEUTROPHILS # BLD AUTO: 4.57 K/UL (ref 1.82–7.42)
NEUTROPHILS NFR BLD: 50.7 % (ref 44–72)
NRBC # BLD AUTO: 0 K/UL
NRBC BLD-RTO: 0 /100 WBC (ref 0–0.2)
PLATELET # BLD AUTO: 535 K/UL (ref 164–446)
PMV BLD AUTO: 9.9 FL (ref 9–12.9)
RBC # BLD AUTO: 5.54 M/UL (ref 4.7–6.1)
WBC # BLD AUTO: 9 K/UL (ref 4.8–10.8)

## 2024-09-24 PROCEDURE — 84153 ASSAY OF PSA TOTAL: CPT | Mod: GA

## 2024-09-24 PROCEDURE — 80061 LIPID PANEL: CPT

## 2024-09-24 PROCEDURE — 85025 COMPLETE CBC W/AUTO DIFF WBC: CPT

## 2024-09-24 PROCEDURE — 36415 COLL VENOUS BLD VENIPUNCTURE: CPT

## 2024-09-24 PROCEDURE — 80053 COMPREHEN METABOLIC PANEL: CPT

## 2024-09-24 PROCEDURE — 82306 VITAMIN D 25 HYDROXY: CPT

## 2024-09-25 LAB
25(OH)D3 SERPL-MCNC: 45 NG/ML (ref 30–100)
ALBUMIN SERPL BCP-MCNC: 4.4 G/DL (ref 3.2–4.9)
ALBUMIN/GLOB SERPL: 1.4 G/DL
ALP SERPL-CCNC: 75 U/L (ref 30–99)
ALT SERPL-CCNC: 34 U/L (ref 2–50)
ANION GAP SERPL CALC-SCNC: 17 MMOL/L (ref 7–16)
AST SERPL-CCNC: 35 U/L (ref 12–45)
BILIRUB SERPL-MCNC: 0.6 MG/DL (ref 0.1–1.5)
BUN SERPL-MCNC: 17 MG/DL (ref 8–22)
CALCIUM ALBUM COR SERPL-MCNC: 9.4 MG/DL (ref 8.5–10.5)
CALCIUM SERPL-MCNC: 9.7 MG/DL (ref 8.5–10.5)
CHLORIDE SERPL-SCNC: 97 MMOL/L (ref 96–112)
CHOLEST SERPL-MCNC: 194 MG/DL (ref 100–199)
CO2 SERPL-SCNC: 21 MMOL/L (ref 20–33)
CREAT SERPL-MCNC: 1.01 MG/DL (ref 0.5–1.4)
GFR SERPLBLD CREATININE-BSD FMLA CKD-EPI: 81 ML/MIN/1.73 M 2
GLOBULIN SER CALC-MCNC: 3.2 G/DL (ref 1.9–3.5)
GLUCOSE SERPL-MCNC: 103 MG/DL (ref 65–99)
HDLC SERPL-MCNC: 37 MG/DL
LDLC SERPL CALC-MCNC: 94 MG/DL
POTASSIUM SERPL-SCNC: 4.1 MMOL/L (ref 3.6–5.5)
PROT SERPL-MCNC: 7.6 G/DL (ref 6–8.2)
PSA SERPL-MCNC: 0.49 NG/ML (ref 0–4)
SODIUM SERPL-SCNC: 135 MMOL/L (ref 135–145)
TRIGL SERPL-MCNC: 317 MG/DL (ref 0–149)

## 2025-04-05 ENCOUNTER — HOSPITAL ENCOUNTER (OUTPATIENT)
Dept: LAB | Facility: MEDICAL CENTER | Age: 70
End: 2025-04-05
Attending: STUDENT IN AN ORGANIZED HEALTH CARE EDUCATION/TRAINING PROGRAM
Payer: MEDICARE

## 2025-04-05 LAB
25(OH)D3 SERPL-MCNC: 47 NG/ML (ref 30–100)
ALBUMIN SERPL BCP-MCNC: 4.1 G/DL (ref 3.2–4.9)
ALBUMIN/GLOB SERPL: 1.2 G/DL
ALP SERPL-CCNC: 72 U/L (ref 30–99)
ALT SERPL-CCNC: 29 U/L (ref 2–50)
ANION GAP SERPL CALC-SCNC: 12 MMOL/L (ref 7–16)
AST SERPL-CCNC: 31 U/L (ref 12–45)
BASOPHILS # BLD AUTO: 1.4 % (ref 0–1.8)
BASOPHILS # BLD: 0.12 K/UL (ref 0–0.12)
BILIRUB SERPL-MCNC: 0.6 MG/DL (ref 0.1–1.5)
BUN SERPL-MCNC: 16 MG/DL (ref 8–22)
CALCIUM ALBUM COR SERPL-MCNC: 9.3 MG/DL (ref 8.5–10.5)
CALCIUM SERPL-MCNC: 9.4 MG/DL (ref 8.5–10.5)
CHLORIDE SERPL-SCNC: 100 MMOL/L (ref 96–112)
CHOLEST SERPL-MCNC: 183 MG/DL (ref 100–199)
CO2 SERPL-SCNC: 24 MMOL/L (ref 20–33)
CREAT SERPL-MCNC: 1 MG/DL (ref 0.5–1.4)
EOSINOPHIL # BLD AUTO: 0.22 K/UL (ref 0–0.51)
EOSINOPHIL NFR BLD: 2.5 % (ref 0–6.9)
ERYTHROCYTE [DISTWIDTH] IN BLOOD BY AUTOMATED COUNT: 48.1 FL (ref 35.9–50)
GFR SERPLBLD CREATININE-BSD FMLA CKD-EPI: 81 ML/MIN/1.73 M 2
GLOBULIN SER CALC-MCNC: 3.3 G/DL (ref 1.9–3.5)
GLUCOSE SERPL-MCNC: 105 MG/DL (ref 65–99)
HCT VFR BLD AUTO: 48.6 % (ref 42–52)
HDLC SERPL-MCNC: 36 MG/DL
HGB BLD-MCNC: 16.1 G/DL (ref 14–18)
IMM GRANULOCYTES # BLD AUTO: 0.04 K/UL (ref 0–0.11)
IMM GRANULOCYTES NFR BLD AUTO: 0.5 % (ref 0–0.9)
LDLC SERPL CALC-MCNC: 92 MG/DL
LYMPHOCYTES # BLD AUTO: 3.27 K/UL (ref 1–4.8)
LYMPHOCYTES NFR BLD: 36.9 % (ref 22–41)
MAGNESIUM SERPL-MCNC: 1.8 MG/DL (ref 1.5–2.5)
MCH RBC QN AUTO: 31.1 PG (ref 27–33)
MCHC RBC AUTO-ENTMCNC: 33.1 G/DL (ref 32.3–36.5)
MCV RBC AUTO: 94 FL (ref 81.4–97.8)
MONOCYTES # BLD AUTO: 0.97 K/UL (ref 0–0.85)
MONOCYTES NFR BLD AUTO: 10.9 % (ref 0–13.4)
NEUTROPHILS # BLD AUTO: 4.24 K/UL (ref 1.82–7.42)
NEUTROPHILS NFR BLD: 47.8 % (ref 44–72)
NRBC # BLD AUTO: 0 K/UL
NRBC BLD-RTO: 0 /100 WBC (ref 0–0.2)
PLATELET # BLD AUTO: 477 K/UL (ref 164–446)
PMV BLD AUTO: 9.8 FL (ref 9–12.9)
POTASSIUM SERPL-SCNC: 4.3 MMOL/L (ref 3.6–5.5)
PROT SERPL-MCNC: 7.4 G/DL (ref 6–8.2)
RBC # BLD AUTO: 5.17 M/UL (ref 4.7–6.1)
SODIUM SERPL-SCNC: 136 MMOL/L (ref 135–145)
TRIGL SERPL-MCNC: 276 MG/DL (ref 0–149)
WBC # BLD AUTO: 8.9 K/UL (ref 4.8–10.8)

## 2025-04-05 PROCEDURE — 80061 LIPID PANEL: CPT

## 2025-04-05 PROCEDURE — 82306 VITAMIN D 25 HYDROXY: CPT

## 2025-04-05 PROCEDURE — 80053 COMPREHEN METABOLIC PANEL: CPT

## 2025-04-05 PROCEDURE — 83735 ASSAY OF MAGNESIUM: CPT

## 2025-04-05 PROCEDURE — 36415 COLL VENOUS BLD VENIPUNCTURE: CPT

## 2025-04-05 PROCEDURE — 85025 COMPLETE CBC W/AUTO DIFF WBC: CPT

## 2025-06-27 ENCOUNTER — TELEPHONE (OUTPATIENT)
Dept: FAMILY PLANNING/WOMEN'S HEALTH CLINIC | Facility: PHYSICIAN GROUP | Age: 70
End: 2025-06-27
Payer: MEDICARE

## 2025-06-27 NOTE — TELEPHONE ENCOUNTER
Message: Called and left message for patient to schedule annual Comprehensive Health Assessment visit with the [facility]. Left phone number for patient to call [employer] at [phone] to schedule.

## 2025-07-11 NOTE — TELEPHONE ENCOUNTER
Message: Called and left message for patient to schedule annual Comprehensive Health Assessment visit with the [facility]. Left phone number for patient to call [employer] at [phone] to schedule.

## (undated) DEVICE — ELECTRODE DUAL RETURN W/ CORD - (50/PK)

## (undated) DEVICE — HEAD HOLDER JUNIOR/ADULT

## (undated) DEVICE — DRAPE MAGNETIC (INSTRA-MAG) - (30/CA)

## (undated) DEVICE — SUTURE 3-0 VICRYL PLUS SH - 8X 18 INCH (12/BX)

## (undated) DEVICE — MAT PATIENT POSITIONING PREVALON (10EA/CA)

## (undated) DEVICE — WATER IRRIGATION STERILE 1000ML (12EA/CA)

## (undated) DEVICE — SPONGE GAUZESTER. 2X2 4-PL - (2/PK 50PK/BX 30BX/CS)

## (undated) DEVICE — RELOAD WITH GRIPPING SURGACE TECHNOLOGY GREEN 60MM (12EA/BX)

## (undated) DEVICE — BITE BLOCK ADULT 60FR (100EA/CA)

## (undated) DEVICE — SLEEVE, VASO, THIGH, MED

## (undated) DEVICE — CHLORAPREP 26 ML APPLICATOR - ORANGE TINT(25/CA)

## (undated) DEVICE — KIT  I.V. START (100EA/CA)

## (undated) DEVICE — PROTECTOR ULNA NERVE - (36PR/CA)

## (undated) DEVICE — KIT ANESTHESIA W/CIRCUIT & 3/LT BAG W/FILTER (20EA/CA)

## (undated) DEVICE — SUTURE GENERAL

## (undated) DEVICE — GOWN SURGEONS LARGE - (32/CA)

## (undated) DEVICE — SET LEADWIRE 5 LEAD BEDSIDE DISPOSABLE ECG (1SET OF 5/EA)

## (undated) DEVICE — GLOVE SZ 7.5 BIOGEL PI MICRO - PF LF (50PR/BX)

## (undated) DEVICE — DECANTER FLD BLS - (50/CA)

## (undated) DEVICE — SOD. CHL. INJ. 0.9% 250 ML - (36/CA 50CA/PF)

## (undated) DEVICE — FILM CASSETTE ENDO

## (undated) DEVICE — CATHETER TROCAR 28FR.

## (undated) DEVICE — DRESSING TRANSPARENT FILM TEGADERM 2.375 X 2.75"  (100EA/BX)"

## (undated) DEVICE — STAPLER 45MM ARTICULATING - ENDO (3EA/BX)

## (undated) DEVICE — SUTURE 2-0 ETHILON FS - (36/BX) 18 INCH

## (undated) DEVICE — TOWELS CLOTH SURGICAL - (4/PK 20PK/CA)

## (undated) DEVICE — SYRINGE SAFETY 5 ML 18 GA X 1-1/2 BLUNT LL (100/BX 4BX/CA)

## (undated) DEVICE — TUBE NG SALEM SUMP 16FR (50EA/CA)

## (undated) DEVICE — GRAFT MESH SEPRAFILM - 10/BX

## (undated) DEVICE — DRAPE CHEST/BREAST - (12EA/CA)

## (undated) DEVICE — STAPLER SKIN DISP - (6/BX 10BX/CA) VISISTAT

## (undated) DEVICE — SUCTION INSTRUMENT YANKAUER BULBOUS TIP W/O VENT (50EA/CA)

## (undated) DEVICE — SHEET TRANSVERSE LAP - (12EA/CA)

## (undated) DEVICE — TUBING CLEARLINK DUO-VENT - C-FLO (48EA/CA)

## (undated) DEVICE — DRESSING NON-ADHERING 8 X 3 - (50/BX)

## (undated) DEVICE — PACK MINOR BASIN - (2EA/CA)

## (undated) DEVICE — LACTATED RINGERS INJ 1000 ML - (14EA/CA 60CA/PF)

## (undated) DEVICE — SUTURE 4-0 VICRYL PLUS FS-2 - 27 INCH (36/BX)

## (undated) DEVICE — GLOVE BIOGEL SZ 6.5 SURGICAL PF LTX (50PR/BX 4BX/CA)

## (undated) DEVICE — RELOAD WITH GRIPPING SURFACE TECHNOLOGY BLUE 60MM (12EA/BX)

## (undated) DEVICE — NEEDLE NON SAFETY 25 GA X 1 1/2 IN HYPO (100EA/BX)

## (undated) DEVICE — SET EXTENSION WITH 2 PORTS (48EA/CA) ***PART #2C8610 IS A SUBSTITUTE*****

## (undated) DEVICE — NEPTUNE 4 PORT MANIFOLD - (20/PK)

## (undated) DEVICE — TUBE CONNECTING SUCTION - CLEAR PLASTIC STERILE 72 IN (50EA/CA)

## (undated) DEVICE — TRAY SKIN SCRUB PVP WET (20EA/CA) PART #DYND70356 DISCONTINUED

## (undated) DEVICE — STAPLER POWERED 60MM (3EA/BX)

## (undated) DEVICE — GOWN WARMING STANDARD FLEX - (30/CA)

## (undated) DEVICE — BLADE SURGICAL #11 - (50/BX)

## (undated) DEVICE — SENSOR SPO2 NEO LNCS ADHESIVE (20/BX) SEE USER NOTES

## (undated) DEVICE — TUBE CONNECT SUCTION CLEAR 120 X 1/4" (50EA/CA)"

## (undated) DEVICE — MASK ANESTHESIA ADULT  - (100/CA)

## (undated) DEVICE — DRESSING TRANSPARENT FILM TEGADERM 4 X 4.75" (50EA/BX)"

## (undated) DEVICE — ELECTRODE 850 FOAM ADHESIVE - HYDROGEL RADIOTRNSPRNT (50/PK)

## (undated) DEVICE — DRAPE LAPAROTOMY T SHEET - (12EA/CA)

## (undated) DEVICE — SPONGE GAUZESTER 4 X 4 4PLY - (128PK/CA)

## (undated) DEVICE — BAG RETRIEVAL 10ML (10EA/BX)

## (undated) DEVICE — SUTURE 2-0 PROL CT-2 (36PK/BX)

## (undated) DEVICE — TROCAR 12MM THORACOPORT (6EA/BX)

## (undated) DEVICE — TRAY CATHETER FOLEY URINE METER W/STATLOCK 350ML (10EA/CA)

## (undated) DEVICE — CANISTER SUCTION 3000ML MECHANICAL FILTER AUTO SHUTOFF MEDI-VAC NONSTERILE LF DISP  (40EA/CA)

## (undated) DEVICE — SET SUCTION/IRRIGATION WITH DISPOSABLE TIP (6/CA )PART #0250-070-520 IS A SUB

## (undated) DEVICE — CONNECTOR Y TBG CRTY 5 IN 1 STERILE (50EA/CA)

## (undated) DEVICE — FORCEP RADIAL JAW 4 STANDARD CAPACITY W/NEEDLE 240CM (40EA/BX)

## (undated) DEVICE — GLOVE BIOGEL INDICATOR SZ 6.5 SURGICAL PF LTX - (50PR/BX 4BX/CA)

## (undated) DEVICE — PACK LAP CHOLE OR - (2EA/CA)

## (undated) DEVICE — SYRINGE 10 ML CONTROL LL (25EA/BX 4BX/CA)

## (undated) DEVICE — DRAPE C-ARM LARGE 41IN X 74 IN - (10/BX 2BX/CA)

## (undated) DEVICE — BOVIE  BLADE 6 EXTENDED - (50/PK)

## (undated) DEVICE — SUTURE 3-0 SILK SH (12PK/BX)

## (undated) DEVICE — CONTAINER, SPECIMEN, STERILE

## (undated) DEVICE — BLADE SURGICAL #15 - (50/BX 3BX/CA)

## (undated) DEVICE — KIT CUSTOM PROCEDURE SINGLE FOR ENDO  (15/CA)

## (undated) DEVICE — SUTURE 3-0 MONOCRYL SH (36PK/BX)

## (undated) DEVICE — GLOVE BIOGEL PI INDICATOR SZ 8.0 SURGICAL PF LF -(50/BX 4BX/CA)

## (undated) DEVICE — CLIP MED LG INTNL HRZN TI ESCP - (20/BX)

## (undated) DEVICE — SLEEVE VASO CALF MED - (10PR/CA)

## (undated) DEVICE — BLADE SURGICAL CLIPPER - (50EA/CA)

## (undated) DEVICE — CLIP MED INTNL HRZN TI ESCP - (25/BX)

## (undated) DEVICE — DRAPE IOBAN II INCISE 23X17 - (10EA/BX 4BX/CA)

## (undated) DEVICE — SYSTEM PEEL & PLACE 13CM INCISIONS

## (undated) DEVICE — DRAIN J-VAC 19FR. ROUND - (10/CS)

## (undated) DEVICE — DRAPESURG STERI-DRAPE LONG - (10/BX 4BX/CA)

## (undated) DEVICE — SUTURE 0 COATED VICRYL 6-18IN - (12PK/BX)

## (undated) DEVICE — SODIUM CHL IRRIGATION 0.9% 1000ML (12EA/CA)

## (undated) DEVICE — SPONGE DRAIN 4 X 4IN 6-PLY - (2/PK25PK/BX12BX/CS)

## (undated) DEVICE — CLOSURE WOUND 1/4 X 4 (STERI - STRIP) (50/BX 4BX/CA)

## (undated) DEVICE — SUTURE 3-0 VICRYL PLUS SH - 27 INCH (36/BX)

## (undated) DEVICE — SUTURE 0 ETHIBOND CT-1 - (12/BX) 18 INCH

## (undated) DEVICE — CANISTER SUCTION RIGID RED 1500CC (40EA/CA)

## (undated) DEVICE — PAD LAP STERILE 18 X 18 - (5/PK 40PK/CA)

## (undated) DEVICE — PACK MAJOR BASIN - (2EA/CA)

## (undated) DEVICE — CATHETER IV 20 GA X 1-1/4 ---SURG.& SDS ONLY--- (50EA/BX)

## (undated) DEVICE — DRESSING XEROFORM 1X8 - (50/BX 4BX/CA)

## (undated) DEVICE — STAPLE 60MM GRAY (12/BX)

## (undated) DEVICE — CLOSURE SKIN STRIP 1/2 X 4 IN - (STERI STRIP) (50/BX 4BX/CA)

## (undated) DEVICE — DERMABOND ADVANCED - (12EA/BX)

## (undated) DEVICE — SUTURE 0 SILK CT-1 (36PK/BX)

## (undated) DEVICE — RESERVOIR SUCTION 100 CC - SILICONE (20EA/CA)

## (undated) DEVICE — SOD. CHL. INJ. 0.9% 1000 ML - (14EA/CA 60CA/PF)

## (undated) DEVICE — SUTURE 0 VICRYL PLUS CT-1 - 36 INCH (36/BX)

## (undated) DEVICE — CATHETER THORACIC 28FR - W/ SENTINEL EYE (10/CA)

## (undated) DEVICE — TOWEL STOP TIMEOUT SAFETY FLAG (40EA/CA)

## (undated) DEVICE — TUBE CHEST 32FR. RIGHT ANGLED (10EA/CA)

## (undated) DEVICE — CLIP LG INTNL HRZN TI ESCP LGT - (20/BX)

## (undated) DEVICE — SYSTEM CLEARIFY VISUALIZATION (10EA/PK)

## (undated) DEVICE — SUTURE  0 ETHIBOND CT-1 30 IN (36PK/BX)